# Patient Record
Sex: FEMALE | Race: BLACK OR AFRICAN AMERICAN | Employment: OTHER | URBAN - METROPOLITAN AREA
[De-identification: names, ages, dates, MRNs, and addresses within clinical notes are randomized per-mention and may not be internally consistent; named-entity substitution may affect disease eponyms.]

---

## 2017-12-26 ENCOUNTER — HOSPITAL ENCOUNTER (EMERGENCY)
Age: 82
Discharge: HOME | End: 2017-12-26
Attending: EMERGENCY MEDICINE | Admitting: EMERGENCY MEDICINE
Payer: MEDICARE

## 2017-12-26 VITALS
SYSTOLIC BLOOD PRESSURE: 202 MMHG | DIASTOLIC BLOOD PRESSURE: 60 MMHG | OXYGEN SATURATION: 97 % | RESPIRATION RATE: 16 BRPM | TEMPERATURE: 98 F | HEART RATE: 82 BPM

## 2017-12-26 DIAGNOSIS — F03.90 DEMENTIA WITHOUT BEHAVIORAL DISTURBANCE, UNSPECIFIED DEMENTIA TYPE: Primary | ICD-10-CM

## 2017-12-26 PROCEDURE — 99282 EMERGENCY DEPT VISIT SF MDM: CPT

## 2017-12-26 NOTE — PROGRESS NOTES
Patient was brought to ED by her son, Shaila Haas who was also being seen for treatment. Per physician, patient has dementia and her son stated that he was having difficulty taking care of her home. A referral has been made to [facility] to complete an evaluation for home health care. Patient's POA is her daughter, Tawnya Campos (telephone number [phone]). CM advised her that [facility] will be contacting them.

## 2017-12-26 NOTE — DISCHARGE INSTRUCTIONS
Alzheimer's Disease: Care Instructions  Your Care Instructions    Alzheimer's disease is a type of dementia. It causes memory loss and affects judgment, language, and behavior. You may have trouble making decisions or may get lost in places that you used to know well. Alzheimer's disease is different than mild memory loss that occurs with aging. It is not clear what causes Alzheimer's disease, but it is the most common form of dementia in older adults. Finding out that you have this disease is a shock. You may be afraid and worried about how the condition will change your life. Although there is no cure at this time, medicine in some cases may slow memory loss for a while. Other medicines may be able to help you sleep or cope with depression and behavior changes. Alzheimer's disease is different for everyone. It may take many years to develop. In some cases, people can function well for a long time. In the early stage of the disease, you can do things at home to make life easier and safer. You also can keep doing your hobbies and other activities. Many people find comfort in planning now for their future needs. Follow-up care is a key part of your treatment and safety. Be sure to make and go to all appointments, and call your doctor if you are having problems. It's also a good idea to know your test results and keep a list of the medicines you take. How can you care for yourself at home? Taking care of yourself  · If your doctor gives you medicines, take them exactly as prescribed. Call your doctor if you think you are having a problem with your medicine. You will get more details on the medicines your doctor prescribes. · Eat a balanced diet. Get plenty of whole grains, fruits, and vegetables every day. If you are not hungry at mealtimes, eat snacks at midmorning and in the afternoon. Try drinks such as Boost, Ensure, or Sustacal if you are having trouble keeping your weight up. · Stay active.  Exercise such as walking may slow the decline of your mental abilities. Try to stay active mentally too. Read and work crossword puzzles if you enjoy these activities. · If you have trouble sleeping, do not nap during the day. Get regular exercise (but not within several hours of bedtime). Drink a glass of warm milk or caffeine-free herbal tea before going to bed. · Ask your doctor about support groups and other resources in your area. They can help people who have Alzheimer's disease and their families. · Be patient. You may find that a task takes you longer than it used to. · If you have not already done so, make a list of advance directives. Advance directives are instructions to your doctor and family members about what kind of care you want if you become unable to speak or express yourself. Talk to a  about making a will, if you do not already have one. Keeping schedules  · Develop a routine. You will feel less frustrated or confused if you have a clear, simple plan of what to do every day. ¨ Make lists of your medicines and when to take them. ¨ Write down appointments and other tasks in a calendar. ¨ Put sticky notes around the house to help you remember events and other things you have to do. ¨ Schedule activities and tasks for times of the day when you are best able to handle them. Staying safe  · Tell someone when you are going out and where you are going. Let the person know when you will be back. Before you go out alone, write down where you are going, how to get there, and how to get back home. Do this even if you have gone there many times before. Take someone along with you when possible. · Make your home safe. Tack down rugs, put no-slip tape in the tub, use handrails, and put safety switches on stoves and appliances. · Have a family member or other caregiver tell you whether you are driving badly. Deciding to stop driving is very hard for many people. Driving helps you feel independent.  Your state 's license bureau can do a driving test if there is any question. Plan for other means of getting around when you are no longer able to drive. · Use strong lighting, especially at night. Put night-lights in bedrooms, hallways, and bathrooms. · Lower the hot water temperature setting to 120°F or lower to avoid burns. When should you call for help? Call 911 anytime you think you may need emergency care. For example, call if:  ? · You are lost and do not know whom to call. ? · You are injured and do not know whom to call. ?Call your doctor now or seek immediate medical care if:  ? · Your symptoms suddenly get much worse. ? Watch closely for changes in your health, and be sure to contact your doctor if:  ? · You want more information about how you can take care of yourself. Where can you learn more? Go to http://shauna-shar.info/. Enter Y179 in the search box to learn more about \"Alzheimer's Disease: Care Instructions. \"  Current as of: May 12, 2017  Content Version: 11.4  © 3239-8743 [employer]. Care instructions adapted under license by [employer] (which disclaims liability or warranty for this information). If you have questions about a medical condition or this instruction, always ask your healthcare professional. [place] [address] any warranty or liability for your use of this information. Dementia: Care Instructions  Your Care Instructions    Dementia is a loss of mental skills that affects your daily life. It is different than the occasional trouble with memory that is part of aging. You may find it hard to remember things that you feel you should be able to remember. Or you may feel that your mind is just not working as well as usual.  Finding out that you have dementia is a shock. You may be afraid and worried about how the condition will change your life.  Although there is no cure at this time, medicine may slow memory loss and improve thinking for a while. Other medicines may be able to help you sleep or cope with depression and behavior changes. Dementia often gets worse slowly. But it can get worse quickly. As dementia gets worse, it may become harder to do common things that take planning, like making a list and going shopping. Over time, the disease may make it hard for you to take care of yourself. Some people with dementia need others to help care for them. Dementia is different for everyone. You may be able to function well for a long time. In the early stage of the condition, you can do things at home to make life easier and safer. You also can keep doing your hobbies and other activities. Many people find comfort in planning now for their future needs. Follow-up care is a key part of your treatment and safety. Be sure to make and go to all appointments, and call your doctor if you are having problems. It's also a good idea to know your test results and keep a list of the medicines you take. How can you care for yourself at home? · Take your medicines exactly as prescribed. Call your doctor if you think you are having a problem with your medicine. · Eat healthy foods. Eat lots of whole grains, fruits, and vegetables every day. If you are not hungry, try snacks or nutritional drinks such as Boost, Ensure, or Sustacal.  · If you have problems sleeping:  ¨ Try not to nap too close to your bedtime. ¨ Exercise regularly. Walking is a good choice. ¨ Try a glass of warm milk or caffeine-free herbal tea before bed. · Do tasks and activities during the time of day when you feel your best. It may help to develop a daily routine. · Post labels, lists, and sticky notes to help you remember things. Write your activities on a calendar you can easily find. Put your clock where you can easily see it. · Stay active. Take walks in familiar places, or with friends or loved ones. Try to stay active mentally too.  Read and work crossword puzzles if you enjoy these activities. · Do not drive unless you can pass an on-road driving test. If you are not sure if you are safe to drive, your state 's license bureau can test you. · Keep a cordless phone and a flashlight with new batteries by your bed. If possible, put a phone in each of the main rooms of your house, or carry a cell phone in case you fall and cannot reach a phone. Or, you can wear a device around your neck or wrist. You push a button that sends a signal for help. Acknowledge your emotions and plan for the future  · Talk openly and honestly with your doctor. · Let yourself grieve. It is common to feel angry, scared, frustrated, anxious, or depressed. · Get emotional support from family, friends, a support group, or a counselor experienced in working with people who have dementia. · Ask for help if you need it. · Plan for the future. ¨ Talk to your family and doctor about preparing a living will and other important papers while you can make decisions. These papers tell your doctors how to care for you at the end of your life. ¨ Consider naming a person to make decisions about your care if you are not able to. When should you call for help? Call 911 anytime you think you may need emergency care. For example, call if:  ? · You are lost and do not know whom to call. ? · You are injured and do not know whom to call. ?Call your doctor now or seek immediate medical care if:  ? · You are more confused or upset than usual.   ? · You feel like you could hurt yourself because your mind is not working well. ? Watch closely for changes in your health, and be sure to contact your doctor if you have any problems. Where can you learn more? Go to http://shauna-shar.info/. Enter C830 in the search box to learn more about \"Dementia: Care Instructions. \"  Current as of: May 12, 2017  Content Version: 11.4  © 9977-4854 [employer], [facility].  Care instructions adapted under license by [employer] (which disclaims liability or warranty for this information). If you have questions about a medical condition or this instruction, always ask your healthcare professional. [address] any warranty or liability for your use of this information. Helping a Person With Alzheimer's Disease: Care Instructions  Your Care Instructions    Alzheimer's disease is a type of dementia. It affects memory, intelligence, judgment, language, and behavior. It is not clear what causes this disease. But it is the most common form of dementia in older adults. It may take many years to develop. Alzheimer's disease is different than mild memory loss that occurs with aging. Family members usually notice symptoms first. But the person also may realize that something is wrong. Follow-up care is a key part of your loved one's treatment and safety. Be sure to make and go to all appointments, and call your doctor if your loved one is having problems. It's also a good idea to know your loved one's test results and keep a list of the medicines he or she takes. How can you care for your loved one at home? · Develop a routine. The person will feel less frustrated or confused with a clear, simple daily plan. Remind him or her about important facts and events. · Be patient. It may take longer for the person to complete a task than it used to. · Help the person eat a balanced diet. Serve plenty of whole grains, fruits, and vegetables every day. If the person is not eating well at mealtimes, give snacks at midmorning and in the afternoon. Offer drinks such as Boost, Ensure, or Sustacal if he or she is losing weight. · Encourage exercise. Walking and other activity may slow the decline of mental ability. Help the person keep an active mind. Encourage hobbies such as reading and crossword puzzles. · Take steps to help if the person is sundowning.  This is the restless behavior and trouble with sleeping that may occur in late afternoon and at night. Try not to let the person nap during the day. Offer a glass of warm milk or caffeine-free tea before bedtime. · Ask family members and friends for help. You may need breaks where others can help care for the person. · Talk to the person's doctor about what resources are available for help in your area. · Review all of the person's medicines with his or her doctor. · For as long as the person is able, allow him or her to make decisions about activities, food, clothing, and other choices. Let the person be independent, even if tasks take more time or are not done perfectly. Tailor tasks to the person's abilities. For example, if cooking is no longer safe, ask for other help. He or she can help set the table or make simple dishes such as a salad. When the person needs help, offer it gently. Keeping safe  · Make your home (or the person's home) safe. Tack down rugs, and put no-slip tape in the tub. Install handrails, and put safety switches on stoves and appliances. Keep rooms free of clutter. Make sure walkways around furniture are clear. Do not move furniture around, because the person may become confused. · Use locks on doors and cupboards. Lock up knives, scissors, medicines, cleaning supplies, and other dangerous things. · Do not let the person drive or cook if he or she cannot do it safely. A person with Alzheimer's should not drive unless he or she is able to pass an on-road driving test. Your state 's license bureau can do a driving test if there is any question. · Get medical alert jewelry for the person so you can be contacted if he or she wanders away. If possible, provide a safe place for wandering, such as an enclosed yard or garden. When should you call for help? Call 911 anytime you think you may need emergency care. For example, call if:  ? · A person who has Alzheimer's disease has disappeared. ? · A person who has Alzheimer's disease is seriously injured. ?Call your doctor now or seek immediate medical care if:  ? · The person you are caring for suddenly sees or hears things that are not there (hallucinates). ? · The person you are caring for has a sudden, drastic change in his or her behavior. ? Watch closely for changes in your loved one's health, and be sure to contact the doctor if:  ? · A person who has Alzheimer's disease gradually gets worse or has symptoms that could cause injury. ? · You need help caring for a person with Alzheimer's disease. ? · The person has problems with his or her medicine. Where can you learn more? Go to http://shauna-shar.info/. Enter N368 in the search box to learn more about \"Helping a Person With Alzheimer's Disease: Care Instructions. \"  Current as of: May 12, 2017  Content Version: 11.4  © 4209-7113 [employer]. Care instructions adapted under license by [employer] (which disclaims liability or warranty for this information). If you have questions about a medical condition or this instruction, always ask your healthcare professional. [place] [address] any warranty or liability for your use of this information.

## 2017-12-26 NOTE — ED NOTES
Dr suresh md, was notified that pt left prior to receiving discharge paperwork. Pt reported that \"i can go to my own doctor. You're not going to keep me. \" pt appeared well-dressed with good hygiene. Unable to fully assess pt r/t pt's refusal. Pt refused to have her blood pressure rechecked, stating \"i feel fine. I don't need to be seen. \" Pt's son reported that she \"helps take care of all of us. \" pt left with pt's son with no si/s of acute distress and a steady gait.

## 2017-12-26 NOTE — ED PROVIDER NOTES
EMERGENCY DEPARTMENT HISTORY AND PHYSICAL EXAM      Date: 12/26/2017  Patient Name: Meet Yeboah    History of Presenting Illness     Chief Complaint   Patient presents with    Altered mental status     pt unsure what son brought her here; history of dementia; states \"I had to be seen so just brought her; I can't clean her or anything and don't want her getting bedsores\"     History Provided By: Patient and son    HPI: Meet Yeboah, 80 y.o. female with PMHx significant for HTN and dementia, presents ambulatory to the ED with cc of diarrhea and a sleep disturbance. Per son, pt has been presenting with intermittent diarrhea for the past week with a moderate amount of relief currently. Pt's son more concerningly reports that the pt has been waking up in the middle of the night often requesting to go to the doctors and stating \"I need to go to the hospital\". Of note, pt reports he has been having a hard time caring for the patient alone reporting \"I am a man. I shouldn't be the one changing her or cleaning her diarrhea\". Pt's son informs he is unable to care for her alone and is not the POA. He reports that his sister is the pt's POA and wants to put the pt in a nursing facility, something he does not want to do. Pt's son reports he needs help caring for the pt. Pt denies any and all symptoms, and notes she does not want to be treated for her hypertension. As there are no physical symptoms or complaints of pain, there are no modifying factors, severity, quality, duration, or associated symptoms. Pt additionally denies any nausea, vomiting, fevers, chills, abdominal pain, urinary complications, chest pain, or SOB. Social Hx: - EtOH; - Smoker; - Illicit Drugs    PCP: Peter Sandhu MD    There are no other complaints, changes, or physical findings at this time. Past History     Past Medical History:  Past Medical History:   Diagnosis Date    Hypertension      Past Surgical History:  History reviewed. No pertinent surgical history. Family History:  History reviewed. No pertinent family history. Social History:  Social History   Substance Use Topics    Smoking status: Never Smoker    Smokeless tobacco: None    Alcohol use No     Allergies:  No Known Allergies    Review of Systems   Review of Systems   Constitutional: Negative for chills and fever. HENT: Negative for congestion, rhinorrhea, sneezing and sore throat. Eyes: Negative for redness and visual disturbance. Respiratory: Negative for shortness of breath. Cardiovascular: Negative for leg swelling. Gastrointestinal: Positive for diarrhea. Negative for abdominal pain, nausea and vomiting. Genitourinary: Negative for difficulty urinating, dysuria, frequency, hematuria and urgency. Musculoskeletal: Negative for back pain, myalgias and neck stiffness. Skin: Negative for rash. Neurological: Negative for dizziness, syncope, weakness and headaches. Hematological: Negative for adenopathy. Psychiatric/Behavioral: Positive for sleep disturbance. All other systems reviewed and are negative. Physical Exam   Physical Exam   Constitutional: She appears well-developed and well-nourished. Pleasantly demented, well dressed   HENT:   Head: Normocephalic and atraumatic. Mouth/Throat: Oropharynx is clear and moist.   Eyes: Conjunctivae and EOM are normal.   Neck: Normal range of motion and full passive range of motion without pain. Neck supple. Cardiovascular: Normal rate, regular rhythm, S1 normal, S2 normal, normal heart sounds, intact distal pulses and normal pulses. No murmur heard. Pulmonary/Chest: Effort normal and breath sounds normal. No respiratory distress. She has no wheezes. Abdominal: Soft. Normal appearance and bowel sounds are normal. She exhibits no distension. There is no tenderness. There is no rebound. Musculoskeletal: Normal range of motion. Neurological: She is alert. She has normal strength.    Oriented to person/self   Skin: Skin is warm, dry and intact. No rash noted. Psychiatric: She has a normal mood and affect. Her speech is normal and behavior is normal. Judgment and thought content normal.   Nursing note and vitals reviewed. Medical Decision Making   I am the first provider for this patient. I reviewed the vital signs, available nursing notes, past medical history, past surgical history, family history and social history. Vital Signs-Reviewed the patient's vital signs. Patient Vitals for the past 12 hrs:   Temp Pulse Resp BP SpO2   12/26/17 1223 98 °F (36.7 °C) 82 16 (!) 202/60 97 %     Pulse Oximetry Analysis - 97% on RA    Cardiac Monitor:   Rate: 82 bpm  Rhythm: Normal Sinus Rhythm      Records Reviewed: Old Medical Records    Provider Notes (Medical Decision Making):   DDx: dementia, UTI    ED Course:   Initial assessment performed. The patients presenting problems have been discussed, and they are in agreement with the care plan formulated and outlined with them. I have encouraged them to ask questions as they arise throughout their visit. Progress Note:   12:55 PM   at bedside. Written by Daysi Cast ED Scribe, as dictated by Milo Cobb MD.     Progress Note:   1:00 PM  Milo Cobb MD had a lengthy discussion with the pt's POA, Ms. Catracho Solitario. POA notes she was with the patient for the past three days without any indications of these complaints. Reports that her two brothers (one present in the ED with pt) have multiple disabilities and mental health concerns hence her being the POA. Reports that the pt has Medicare and not Medicaid and therefore is unable to get home health care. Notes she feels this is a family affair and requests that if home services are able to be provided for the pt, that would be appreciated. States to call her as needed. Discussed pt's elevated BP and indication of agitation as the cause; all in agreement to not treat. Written by Hugo Maguire, ED Scribe, as dictated by Canelo Noyola MD.     Progress Note:   1:02 PM  Pt with her coat on attempting to leave the ED. Will discharge pt and instruct pt and son to return to the ED if pt would like to be evaluated or has any emergent conditions. Written by Hugo Maguire, ED Scribe, as dictated by Canelo Noyola MD.     Disposition:  DISCHARGE NOTE:    1:27 PM  The patient is ready for discharge. The patient signs, symptoms, diagnosis, and discharge instructions have been discussed and the patient has conveyed their understanding. The patient is to follow-up as reccommended or returned to the ER should their symptoms worsen. Plan has been discussed and patient is in agreement. PLAN:  1. Follow-up Information     Follow up With Details Comments Janet Boyle III, MD Schedule an appointment as soon as possible for a visit  [address] EMERGENCY DEPT  As needed, If symptoms worsen [address] [facility]  [phone]        Return to ED if worse     Diagnosis     Clinical Impression:   1. Dementia without behavioral disturbance, unspecified dementia type      Attestations: This note is prepared by Hugo Maguire, acting as Scribe for Canelo Noyola MD.    Canelo Noyola MD: The scribe's documentation has been prepared under my direction and personally reviewed by me in its entirety. I confirm that the note above accurately reflects all work, treatment, procedures, and medical decision making performed by me.

## 2017-12-27 ENCOUNTER — HOME HEALTH ADMISSION (OUTPATIENT)
Dept: HOME HEALTH SERVICES | Facility: HOME HEALTH | Age: 82
End: 2017-12-27
Payer: MEDICARE

## 2017-12-31 ENCOUNTER — HOME CARE VISIT (OUTPATIENT)
Dept: SCHEDULING | Facility: HOME HEALTH | Age: 82
End: 2017-12-31
Payer: MEDICARE

## 2017-12-31 PROCEDURE — 3331090001 HH PPS REVENUE CREDIT

## 2017-12-31 PROCEDURE — 400013 HH SOC

## 2017-12-31 PROCEDURE — 3331090002 HH PPS REVENUE DEBIT

## 2017-12-31 PROCEDURE — G0299 HHS/HOSPICE OF RN EA 15 MIN: HCPCS

## 2018-01-01 ENCOUNTER — HOSPITAL ENCOUNTER (EMERGENCY)
Age: 83
Discharge: HOME | End: 2018-01-01
Attending: EMERGENCY MEDICINE
Payer: MEDICARE

## 2018-01-01 ENCOUNTER — HOME CARE VISIT (OUTPATIENT)
Dept: HOME HEALTH SERVICES | Facility: HOME HEALTH | Age: 83
End: 2018-01-01
Payer: MEDICARE

## 2018-01-01 ENCOUNTER — APPOINTMENT (OUTPATIENT)
Dept: GENERAL RADIOLOGY | Age: 83
End: 2018-01-01
Attending: PHYSICIAN ASSISTANT
Payer: MEDICARE

## 2018-01-01 VITALS
BODY MASS INDEX: 21.01 KG/M2 | OXYGEN SATURATION: 99 % | WEIGHT: 107 LBS | HEIGHT: 60 IN | SYSTOLIC BLOOD PRESSURE: 195 MMHG | HEART RATE: 67 BPM | TEMPERATURE: 97.9 F | DIASTOLIC BLOOD PRESSURE: 83 MMHG | RESPIRATION RATE: 18 BRPM

## 2018-01-01 VITALS
BODY MASS INDEX: 22.08 KG/M2 | HEART RATE: 73 BPM | HEIGHT: 62 IN | TEMPERATURE: 98 F | RESPIRATION RATE: 18 BRPM | SYSTOLIC BLOOD PRESSURE: 138 MMHG | OXYGEN SATURATION: 96 % | DIASTOLIC BLOOD PRESSURE: 70 MMHG | WEIGHT: 120 LBS

## 2018-01-01 DIAGNOSIS — S40.011A CONTUSION OF RIGHT SHOULDER, INITIAL ENCOUNTER: ICD-10-CM
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
## 2018-01-01 PROCEDURE — 3331090001 HH PPS REVENUE CREDIT

## 2018-01-01 PROCEDURE — 3331090002 HH PPS REVENUE DEBIT

## 2018-01-01 PROCEDURE — 73030 X-RAY EXAM OF SHOULDER: CPT

## 2018-01-01 PROCEDURE — 99282 EMERGENCY DEPT VISIT SF MDM: CPT

## 2018-01-01 NOTE — ED PROVIDER NOTES
EMERGENCY DEPARTMENT HISTORY AND PHYSICAL EXAM    Date: 1/1/2018  Patient Name: José Miguel Castro    History of Presenting Illness     Chief Complaint   Patient presents with    Shoulder Pain     right shouder         History Provided By: Patient's Son (s) and patient    Chief Complaint: R shoulder pain  Duration: hours   Timing:  Acute  Location: R shoulder  Quality: Aching  Severity: Moderate  Modifying Factors: worse with movement  Associated Symptoms: denies any other associated signs or symptoms      HPI: José Miguel Castro is a 80 y.o. female with a PMH of hypertension, dementia who presents with R shoulder pain. Son was with her in next room. Pt sts she slipped, son notes had on fuzzy socks and did not have shoes on, GLF. Pt sts fell on R shoulder, denies hitting head. Son found on R side and assisted up. Has been ambulatory since fall. No loc. No other complaints. Specifically denies HA, neck pain, back pain, numbness/tingling. Notes has had intermittent R shoulder pain for many years since injury in [place] and reported FX to clavicle. PCP: Trey Arriaga III, MD    Current Outpatient Prescriptions   Medication Sig Dispense Refill    acetaminophen (TYLENOL ARTHRITIS PAIN) 650 mg TbER Take 650 mg by mouth daily.  QUEtiapine (SEROQUEL) 25 mg tablet Take 25 mg by mouth nightly. Past History     Past Medical History:  Past Medical History:   Diagnosis Date    Hypertension        Past Surgical History:  History reviewed. No pertinent surgical history. Family History:  History reviewed. No pertinent family history. Social History:  Social History   Substance Use Topics    Smoking status: Never Smoker    Smokeless tobacco: Never Used    Alcohol use No       Allergies:  No Known Allergies      Review of Systems   Review of Systems   Reason unable to perform ROS: somewhat limited due to patient dementia. Constitutional: Negative for chills and fever. HENT: Negative for sore throat. Eyes: Negative for pain and discharge. Respiratory: Negative for cough. Cardiovascular: Negative for chest pain. Gastrointestinal: Negative for abdominal pain, nausea and vomiting. Musculoskeletal: Negative for back pain and neck pain. R shoulder pain   Skin: Negative for rash and wound. Neurological: Negative for facial asymmetry and headaches. Hematological: Does not bruise/bleed easily. Psychiatric/Behavioral: Negative for behavioral problems. Sons note behavior and mentation at baseline   All other systems reviewed and are negative. Physical Exam     Vitals:    01/01/18 1144   BP: 195/83   Pulse: 67   Resp: 18   Temp: 97.9 °F (36.6 °C)   SpO2: 99%   Weight: 48.5 kg (107 lb)   Height: 5' (1.524 m)     Physical Exam   Constitutional: She is oriented to person, place, and time. She appears well-developed and well-nourished. No distress. HENT:   Head: Normocephalic and atraumatic. Nose: Nose normal.   Mouth/Throat: Oropharynx is clear and moist.   Eyes: Conjunctivae and EOM are normal. Pupils are equal, round, and reactive to light. Neck: Normal range of motion. Neck supple. No C spine tenderness. Cardiovascular: Normal rate and normal heart sounds. No murmur heard. Pulmonary/Chest: Effort normal. She has no wheezes. She has no rales. Abdominal: Soft. There is no tenderness. Musculoskeletal: Normal range of motion. R shoulder: no reproducible tenderness,   However reproducible pain with forward flexion over 90 degrees or abduction over 100 degrees. No swelling, abrasion, ecchymosis to shoulder, clavicle. R elbow, wrist NT with FROM. R radial pulse strong. R hand with normal ROM. L shoulder FROM NT. No T or L spine ttp. No Hip tenderness. Hip fROM B   Lymphadenopathy:     She has no cervical adenopathy. Neurological: She is alert and oriented to person, place, and time. PEARLA  Neck FROM   equal  No facial droop.    BLE  - flex at hip, extend at knee, DF/PF at ankle B, equal, normal strength for age. Skin: Skin is warm and dry. She is not diaphoretic. Psychiatric: She has a normal mood and affect. Her behavior is normal.   Nursing note and vitals reviewed. Diagnostic Study Results       Radiologic Studies -   XR SHOULDER RT AP/LAT MIN 2 V   Final Result      no fracture  Degenerative changes    Medical Decision Making   I am the first provider for this patient. I reviewed the vital signs, available nursing notes, past medical history, past surgical history, family history and social history. Vital Signs-Reviewed the patient's vital signs. ED Course:     Disposition:  Home  pcp and/or ortho follow up    DISCHARGE NOTE:   12:50 PM      Care plan outlined and precautions discussed. Patient has no new complaints, changes, or physical findings. Results of xray were reviewed with the patient and sons at bedside. All medications were reviewed with the patient; will d/c home with tylenol. All of pt's questions and concerns were addressed. Patient was instructed and agrees to follow up with her primary care doctor or [facility] as referred, as well as to return to the ED upon further deterioration. Patient is ready to go home. Fall prevention reviewed. Follow-up Information     Follow up With Details Comments Janet Boyle III, MD  For continued management of your shoulder pain. [address] [place] [address],[address]      [address] [place] [address] - [facility]  If you continue to have pain in the shoulder, follow up with the orthopedic doctor. [id number] [facility]  [address]  [phone]          Current Discharge Medication List          Provider Notes (Medical Decision Making):   Fx, contusion. She has no other complaints than shoulder pain. Mechanical GLF  No bony spine pain. No focal neuro findings. Procedures:  Procedures        Diagnosis     Clinical Impression:   1. Fall, initial encounter    2.  Contusion of right shoulder, initial encounter

## 2018-01-01 NOTE — DISCHARGE INSTRUCTIONS
Preventing Falls: Care Instructions  Your Care Instructions    Getting around your home safely can be a challenge if you have injuries or health problems that make it easy for you to fall. Loose rugs and furniture in walkways are among the dangers for many older people who have problems walking or who have poor eyesight. People who have conditions such as arthritis, osteoporosis, or dementia also have to be careful not to fall. You can make your home safer with a few simple measures. Follow-up care is a key part of your treatment and safety. Be sure to make and go to all appointments, and call your doctor if you are having problems. It's also a good idea to know your test results and keep a list of the medicines you take. How can you care for yourself at home? Taking care of yourself  · You may get dizzy if you do not drink enough water. To prevent dehydration, drink plenty of fluids, enough so that your urine is light yellow or clear like water. Choose water and other caffeine-free clear liquids. If you have kidney, heart, or liver disease and have to limit fluids, talk with your doctor before you increase the amount of fluids you drink. · Exercise regularly to improve your strength, muscle tone, and balance. Walk if you can. Swimming may be a good choice if you cannot walk easily. · Have your vision and hearing checked each year or any time you notice a change. If you have trouble seeing and hearing, you might not be able to avoid objects and could lose your balance. · Know the side effects of the medicines you take. Ask your doctor or pharmacist whether the medicines you take can affect your balance. Sleeping pills or sedatives can affect your balance. · Limit the amount of alcohol you drink. Alcohol can impair your balance and other senses. · Ask your doctor whether calluses or corns on your feet need to be removed.  If you wear loose-fitting shoes because of calluses or corns, you can lose your balance and fall. · Talk to your doctor if you have numbness in your feet. Preventing falls at home  · Remove raised doorway thresholds, throw rugs, and clutter. Repair loose carpet or raised areas in the floor. · Move furniture and electrical cords to keep them out of walking paths. · Use nonskid floor wax, and wipe up spills right away, especially on ceramic tile floors. · If you use a walker or cane, put rubber tips on it. If you use crutches, clean the bottoms of them regularly with an abrasive pad, such as steel wool. · Keep your house well lit, especially [place], and outside walkways. Use night-lights in areas such as hallways and bathrooms. Add extra light switches or use remote switches (such as switches that go on or off when you clap your hands) to make it easier to turn lights on if you have to get up during the night. · Install sturdy handrails on stairways. · Move items in your cabinets so that the things you use a lot are on the lower shelves (about waist level). · Keep a cordless phone and a flashlight with new batteries by your bed. If possible, put a phone in each of the main rooms of your house, or carry a cell phone in case you fall and cannot reach a phone. Or, you can wear a device around your neck or wrist. You push a button that sends a signal for help. · Wear low-heeled shoes that fit well and give your feet good support. Use footwear with nonskid soles. Check the heels and soles of your shoes for wear. Repair or replace worn heels or soles. · Do not wear socks without shoes on wood floors. · Walk on the grass when the sidewalks are slippery. If you live in an area that gets snow and ice in the winter, sprinkle salt on slippery steps and sidewalks. Preventing falls in the bath  · Install grab bars and nonskid mats inside and outside your shower or tub and near the toilet and sinks. · Use shower chairs and bath benches.   · Use a hand-held shower head that will allow you to sit while showering. · Get into a tub or shower by putting the weaker leg in first. Get out of a tub or shower with your strong side first.  · Repair loose toilet seats and consider installing a raised toilet seat to make getting on and off the toilet easier. · Keep your bathroom door unlocked while you are in the shower. Where can you learn more? Go to http://shauna-shar.info/. Enter 0476 [id number] in the search box to learn more about \"Preventing Falls: Care Instructions. \"  Current as of: May 12, 2017  Content Version: 11.4  © 8589-2405 [employer]. Care instructions adapted under license by [employer] (which disclaims liability or warranty for this information). If you have questions about a medical condition or this instruction, always ask your healthcare professional. [place] [address] any warranty or liability for your use of this information. Shoulder Pain: Care Instructions  Your Care Instructions    You can hurt your shoulder by using it too much during an activity, such as fishing or baseball. It can also happen as part of the everyday wear and tear of getting older. Shoulder injuries can be slow to heal, but your shoulder should get better with time. Your doctor may recommend a sling to rest your shoulder. If you have injured your shoulder, you may need testing and treatment. Follow-up care is a key part of your treatment and safety. Be sure to make and go to all appointments, and call your doctor if you are having problems. It's also a good idea to know your test results and keep a list of the medicines you take. How can you care for yourself at home? · Take pain medicines exactly as directed. ¨ If the doctor gave you a prescription medicine for pain, take it as prescribed. ¨ If you are not taking a prescription pain medicine, ask your doctor if you can take an over-the-counter medicine.   ¨ Do not take two or more pain medicines at the same time unless the doctor told you to. Many pain medicines contain acetaminophen, which is Tylenol. Too much acetaminophen (Tylenol) can be harmful. · If your doctor recommends that you wear a sling, use it as directed. Do not take it off before your doctor tells you to. · Put ice or a cold pack on the sore area for 10 to 20 minutes at a time. Put a thin cloth between the ice and your skin. · If there is no swelling, you can put moist heat, a heating pad, or a warm cloth on your shoulder. Some doctors suggest alternating between hot and cold. · Rest your shoulder for a few days. If your doctor recommends it, you can then begin gentle exercise of the shoulder, but do not lift anything heavy. When should you call for help? Call 911 anytime you think you may need emergency care. For example, call if:  ? · You have chest pain or pressure. This may occur with:  ¨ Sweating. ¨ Shortness of breath. ¨ Nausea or vomiting. ¨ Pain that spreads from the chest to the neck, jaw, or one or both shoulders or arms. ¨ Dizziness or lightheadedness. ¨ A fast or uneven pulse. After calling 911, chew 1 adult-strength aspirin. Wait for an ambulance. Do not try to drive yourself. ? · Your arm or hand is cool or pale or changes color. ?Call your doctor now or seek immediate medical care if:  ? · You have signs of infection, such as:  ¨ Increased pain, swelling, warmth, or redness in your shoulder. ¨ Red streaks leading from a place on your shoulder. ¨ Pus draining from an area of your shoulder. ¨ Swollen lymph nodes in your neck, armpits, or groin. ¨ A fever. ? Watch closely for changes in your health, and be sure to contact your doctor if:  ? · You cannot use your shoulder. ? · Your shoulder does not get better as expected. Where can you learn more? Go to http://shauna-shar.info/. Enter C608 in the search box to learn more about \"Shoulder Pain: Care Instructions. \"  Current as of: March 21, 2017  Content Version: 11.4  © 1290-6538 [employer]. Care instructions adapted under license by [employer] (which disclaims liability or warranty for this information). If you have questions about a medical condition or this instruction, always ask your healthcare professional. [address] any warranty or liability for your use of this information.

## 2018-01-01 NOTE — ED NOTES
Patient (s)   given copy of dc instructions and   script(s). Patient(s) sons verbalized understanding of instructions and script (s). Patient given a current medication reconciliation form and verbalized understanding of their medications. Patient (s)sons verbalized understanding of the importance of discussing medications with  his or her physician or clinic when they follow up. Patient alert and oriented and in no acute distress. Pt verbalizes pain scale of 0 out of 10. Patient discharged home ambulatory with sons.

## 2018-01-01 NOTE — ED NOTES
Emergency Department Nursing Plan of Care       The Nursing Plan of Care is developed from the Nursing assessment and Emergency Department Attending provider initial evaluation. The plan of care may be reviewed in the ED Provider note.     The Plan of Care was developed with the following considerations:   Patient / Family readiness to learn indicated by:verbalized understanding  Persons(s) to be included in education: patient  Barriers to Learning/Limitations:No    Signed     Alvarez Treviño RN    1/1/2018   12:12 PM

## 2018-01-02 PROCEDURE — 3331090002 HH PPS REVENUE DEBIT

## 2018-01-02 PROCEDURE — 3331090001 HH PPS REVENUE CREDIT

## 2018-01-03 PROCEDURE — 3331090001 HH PPS REVENUE CREDIT

## 2018-01-03 PROCEDURE — 3331090002 HH PPS REVENUE DEBIT

## 2018-01-04 ENCOUNTER — HOME CARE VISIT (OUTPATIENT)
Dept: HOME HEALTH SERVICES | Facility: HOME HEALTH | Age: 83
End: 2018-01-04
Payer: MEDICARE

## 2018-01-04 ENCOUNTER — HOME CARE VISIT (OUTPATIENT)
Dept: SCHEDULING | Facility: HOME HEALTH | Age: 83
End: 2018-01-04
Payer: MEDICARE

## 2018-01-04 PROCEDURE — 3331090002 HH PPS REVENUE DEBIT

## 2018-01-04 PROCEDURE — 3331090001 HH PPS REVENUE CREDIT

## 2018-01-04 PROCEDURE — G0155 HHCP-SVS OF CSW,EA 15 MIN: HCPCS

## 2018-01-05 PROCEDURE — 3331090002 HH PPS REVENUE DEBIT

## 2018-01-05 PROCEDURE — 3331090001 HH PPS REVENUE CREDIT

## 2018-01-06 PROCEDURE — 3331090002 HH PPS REVENUE DEBIT

## 2018-01-06 PROCEDURE — 3331090001 HH PPS REVENUE CREDIT

## 2018-01-07 PROCEDURE — 3331090002 HH PPS REVENUE DEBIT

## 2018-01-07 PROCEDURE — 3331090001 HH PPS REVENUE CREDIT

## 2018-01-08 ENCOUNTER — HOME CARE VISIT (OUTPATIENT)
Dept: HOME HEALTH SERVICES | Facility: HOME HEALTH | Age: 83
End: 2018-01-08
Payer: MEDICARE

## 2018-01-08 ENCOUNTER — HOME CARE VISIT (OUTPATIENT)
Dept: SCHEDULING | Facility: HOME HEALTH | Age: 83
End: 2018-01-08
Payer: MEDICARE

## 2018-01-08 PROCEDURE — 3331090002 HH PPS REVENUE DEBIT

## 2018-01-08 PROCEDURE — 3331090001 HH PPS REVENUE CREDIT

## 2018-01-08 PROCEDURE — G0156 HHCP-SVS OF AIDE,EA 15 MIN: HCPCS

## 2018-01-09 PROCEDURE — 3331090002 HH PPS REVENUE DEBIT

## 2018-01-09 PROCEDURE — 3331090001 HH PPS REVENUE CREDIT

## 2018-01-10 PROCEDURE — 3331090001 HH PPS REVENUE CREDIT

## 2018-01-10 PROCEDURE — 3331090002 HH PPS REVENUE DEBIT

## 2018-01-11 ENCOUNTER — HOME CARE VISIT (OUTPATIENT)
Dept: SCHEDULING | Facility: HOME HEALTH | Age: 83
End: 2018-01-11
Payer: MEDICARE

## 2018-01-11 PROCEDURE — 3331090001 HH PPS REVENUE CREDIT

## 2018-01-11 PROCEDURE — 3331090002 HH PPS REVENUE DEBIT

## 2018-01-11 PROCEDURE — G0156 HHCP-SVS OF AIDE,EA 15 MIN: HCPCS

## 2018-01-12 PROCEDURE — 3331090001 HH PPS REVENUE CREDIT

## 2018-01-12 PROCEDURE — 3331090002 HH PPS REVENUE DEBIT

## 2018-01-13 ENCOUNTER — HOME CARE VISIT (OUTPATIENT)
Dept: SCHEDULING | Facility: HOME HEALTH | Age: 83
End: 2018-01-13
Payer: MEDICARE

## 2018-01-13 PROCEDURE — 3331090001 HH PPS REVENUE CREDIT

## 2018-01-13 PROCEDURE — G0300 HHS/HOSPICE OF LPN EA 15 MIN: HCPCS

## 2018-01-13 PROCEDURE — 3331090002 HH PPS REVENUE DEBIT

## 2018-01-14 PROCEDURE — 3331090002 HH PPS REVENUE DEBIT

## 2018-01-14 PROCEDURE — 3331090001 HH PPS REVENUE CREDIT

## 2018-01-15 ENCOUNTER — HOME CARE VISIT (OUTPATIENT)
Dept: SCHEDULING | Facility: HOME HEALTH | Age: 83
End: 2018-01-15
Payer: MEDICARE

## 2018-01-15 VITALS
OXYGEN SATURATION: 96 % | RESPIRATION RATE: 17 BRPM | HEART RATE: 74 BPM | DIASTOLIC BLOOD PRESSURE: 70 MMHG | SYSTOLIC BLOOD PRESSURE: 110 MMHG | TEMPERATURE: 97.8 F

## 2018-01-15 PROCEDURE — G0156 HHCP-SVS OF AIDE,EA 15 MIN: HCPCS

## 2018-01-15 PROCEDURE — 3331090001 HH PPS REVENUE CREDIT

## 2018-01-15 PROCEDURE — 3331090002 HH PPS REVENUE DEBIT

## 2018-01-16 PROCEDURE — 3331090001 HH PPS REVENUE CREDIT

## 2018-01-16 PROCEDURE — 3331090002 HH PPS REVENUE DEBIT

## 2018-01-17 ENCOUNTER — HOME CARE VISIT (OUTPATIENT)
Dept: SCHEDULING | Facility: HOME HEALTH | Age: 83
End: 2018-01-17
Payer: MEDICARE

## 2018-01-17 VITALS
TEMPERATURE: 97.8 F | HEART RATE: 60 BPM | RESPIRATION RATE: 18 BRPM | SYSTOLIC BLOOD PRESSURE: 120 MMHG | DIASTOLIC BLOOD PRESSURE: 70 MMHG

## 2018-01-17 PROCEDURE — G0300 HHS/HOSPICE OF LPN EA 15 MIN: HCPCS

## 2018-01-17 PROCEDURE — 3331090001 HH PPS REVENUE CREDIT

## 2018-01-17 PROCEDURE — 3331090002 HH PPS REVENUE DEBIT

## 2018-01-18 ENCOUNTER — HOME CARE VISIT (OUTPATIENT)
Dept: SCHEDULING | Facility: HOME HEALTH | Age: 83
End: 2018-01-18
Payer: MEDICARE

## 2018-01-18 PROCEDURE — G0156 HHCP-SVS OF AIDE,EA 15 MIN: HCPCS

## 2018-01-18 PROCEDURE — 3331090002 HH PPS REVENUE DEBIT

## 2018-01-18 PROCEDURE — 3331090001 HH PPS REVENUE CREDIT

## 2018-01-19 ENCOUNTER — HOME CARE VISIT (OUTPATIENT)
Dept: SCHEDULING | Facility: HOME HEALTH | Age: 83
End: 2018-01-19
Payer: MEDICARE

## 2018-01-19 ENCOUNTER — HOME CARE VISIT (OUTPATIENT)
Dept: HOME HEALTH SERVICES | Facility: HOME HEALTH | Age: 83
End: 2018-01-19
Payer: MEDICARE

## 2018-01-19 PROCEDURE — 3331090001 HH PPS REVENUE CREDIT

## 2018-01-19 PROCEDURE — G0299 HHS/HOSPICE OF RN EA 15 MIN: HCPCS

## 2018-01-19 PROCEDURE — 3331090002 HH PPS REVENUE DEBIT

## 2018-01-20 VITALS
DIASTOLIC BLOOD PRESSURE: 82 MMHG | RESPIRATION RATE: 12 BRPM | HEART RATE: 60 BPM | OXYGEN SATURATION: 96 % | SYSTOLIC BLOOD PRESSURE: 158 MMHG | TEMPERATURE: 98 F

## 2018-01-20 PROCEDURE — 3331090002 HH PPS REVENUE DEBIT

## 2018-01-20 PROCEDURE — 3331090001 HH PPS REVENUE CREDIT

## 2018-01-21 PROCEDURE — 3331090001 HH PPS REVENUE CREDIT

## 2018-01-21 PROCEDURE — 3331090002 HH PPS REVENUE DEBIT

## 2018-01-22 PROCEDURE — 3331090001 HH PPS REVENUE CREDIT

## 2018-01-22 PROCEDURE — 3331090002 HH PPS REVENUE DEBIT

## 2018-01-23 PROCEDURE — 3331090001 HH PPS REVENUE CREDIT

## 2018-01-23 PROCEDURE — 3331090002 HH PPS REVENUE DEBIT

## 2018-01-24 PROCEDURE — 3331090002 HH PPS REVENUE DEBIT

## 2018-01-24 PROCEDURE — 3331090001 HH PPS REVENUE CREDIT

## 2018-01-25 PROCEDURE — 3331090001 HH PPS REVENUE CREDIT

## 2018-01-25 PROCEDURE — 3331090002 HH PPS REVENUE DEBIT

## 2018-01-26 PROCEDURE — 3331090001 HH PPS REVENUE CREDIT

## 2018-01-26 PROCEDURE — 3331090002 HH PPS REVENUE DEBIT

## 2018-01-27 PROCEDURE — 3331090001 HH PPS REVENUE CREDIT

## 2018-01-27 PROCEDURE — 3331090002 HH PPS REVENUE DEBIT

## 2018-01-28 PROCEDURE — 3331090001 HH PPS REVENUE CREDIT

## 2018-01-28 PROCEDURE — 3331090002 HH PPS REVENUE DEBIT

## 2018-01-29 PROCEDURE — 3331090002 HH PPS REVENUE DEBIT

## 2018-01-29 PROCEDURE — 3331090001 HH PPS REVENUE CREDIT

## 2018-01-30 PROCEDURE — 3331090002 HH PPS REVENUE DEBIT

## 2018-01-30 PROCEDURE — 3331090001 HH PPS REVENUE CREDIT

## 2018-01-31 ENCOUNTER — HOME CARE VISIT (OUTPATIENT)
Dept: SCHEDULING | Facility: HOME HEALTH | Age: 83
End: 2018-01-31
Payer: MEDICARE

## 2018-01-31 ENCOUNTER — HOME CARE VISIT (OUTPATIENT)
Dept: HOME HEALTH SERVICES | Facility: HOME HEALTH | Age: 83
End: 2018-01-31
Payer: MEDICARE

## 2018-01-31 PROCEDURE — G0299 HHS/HOSPICE OF RN EA 15 MIN: HCPCS

## 2018-01-31 PROCEDURE — 3331090001 HH PPS REVENUE CREDIT

## 2018-01-31 PROCEDURE — 3331090002 HH PPS REVENUE DEBIT

## 2018-02-03 VITALS
OXYGEN SATURATION: 96 % | HEART RATE: 60 BPM | RESPIRATION RATE: 16 BRPM | SYSTOLIC BLOOD PRESSURE: 146 MMHG | DIASTOLIC BLOOD PRESSURE: 80 MMHG | TEMPERATURE: 97.4 F

## 2018-03-29 ENCOUNTER — APPOINTMENT (OUTPATIENT)
Dept: GENERAL RADIOLOGY | Age: 83
End: 2018-03-29
Attending: EMERGENCY MEDICINE
Payer: MEDICARE

## 2018-03-29 ENCOUNTER — HOSPITAL ENCOUNTER (EMERGENCY)
Age: 83
Discharge: HOME | End: 2018-03-29
Attending: EMERGENCY MEDICINE
Payer: MEDICARE

## 2018-03-29 VITALS
RESPIRATION RATE: 16 BRPM | TEMPERATURE: 98.1 F | SYSTOLIC BLOOD PRESSURE: 168 MMHG | HEIGHT: 63 IN | DIASTOLIC BLOOD PRESSURE: 68 MMHG | BODY MASS INDEX: 21.76 KG/M2 | HEART RATE: 88 BPM | WEIGHT: 122.8 LBS | OXYGEN SATURATION: 100 %

## 2018-03-29 DIAGNOSIS — S70.02XA CONTUSION OF LEFT HIP, INITIAL ENCOUNTER: Primary | ICD-10-CM

## 2018-03-29 PROCEDURE — 73502 X-RAY EXAM HIP UNI 2-3 VIEWS: CPT

## 2018-03-29 PROCEDURE — 74011250637 HC RX REV CODE- 250/637: Performed by: EMERGENCY MEDICINE

## 2018-03-29 PROCEDURE — 99283 EMERGENCY DEPT VISIT LOW MDM: CPT

## 2018-03-29 RX ORDER — ACETAMINOPHEN 325 MG/1
650 TABLET ORAL ONCE
Status: COMPLETED | OUTPATIENT
Start: 2018-03-29 | End: 2018-03-29

## 2018-03-29 RX ADMIN — ACETAMINOPHEN 650 MG: 325 TABLET, FILM COATED ORAL at 09:45

## 2018-03-29 NOTE — DISCHARGE INSTRUCTIONS
Preventing Falls: Care Instructions  Your Care Instructions    Getting around your home safely can be a challenge if you have injuries or health problems that make it easy for you to fall. Loose rugs and furniture in walkways are among the dangers for many older people who have problems walking or who have poor eyesight. People who have conditions such as arthritis, osteoporosis, or dementia also have to be careful not to fall. You can make your home safer with a few simple measures. Follow-up care is a key part of your treatment and safety. Be sure to make and go to all appointments, and call your doctor if you are having problems. It's also a good idea to know your test results and keep a list of the medicines you take. How can you care for yourself at home? Taking care of yourself  · You may get dizzy if you do not drink enough water. To prevent dehydration, drink plenty of fluids, enough so that your urine is light yellow or clear like water. Choose water and other caffeine-free clear liquids. If you have kidney, heart, or liver disease and have to limit fluids, talk with your doctor before you increase the amount of fluids you drink. · Exercise regularly to improve your strength, muscle tone, and balance. Walk if you can. Swimming may be a good choice if you cannot walk easily. · Have your vision and hearing checked each year or any time you notice a change. If you have trouble seeing and hearing, you might not be able to avoid objects and could lose your balance. · Know the side effects of the medicines you take. Ask your doctor or pharmacist whether the medicines you take can affect your balance. Sleeping pills or sedatives can affect your balance. · Limit the amount of alcohol you drink. Alcohol can impair your balance and other senses. · Ask your doctor whether calluses or corns on your feet need to be removed.  If you wear loose-fitting shoes because of calluses or corns, you can lose your balance and fall. · Talk to your doctor if you have numbness in your feet. Preventing falls at home  · Remove raised doorway thresholds, throw rugs, and clutter. Repair loose carpet or raised areas in the floor. · Move furniture and electrical cords to keep them out of walking paths. · Use nonskid floor wax, and wipe up spills right away, especially on ceramic tile floors. · If you use a walker or cane, put rubber tips on it. If you use crutches, clean the bottoms of them regularly with an abrasive pad, such as steel wool. · Keep your house well lit, especially [place], and outside walkways. Use night-lights in areas such as hallways and bathrooms. Add extra light switches or use remote switches (such as switches that go on or off when you clap your hands) to make it easier to turn lights on if you have to get up during the night. · Install sturdy handrails on stairways. · Move items in your cabinets so that the things you use a lot are on the lower shelves (about waist level). · Keep a cordless phone and a flashlight with new batteries by your bed. If possible, put a phone in each of the main rooms of your house, or carry a cell phone in case you fall and cannot reach a phone. Or, you can wear a device around your neck or wrist. You push a button that sends a signal for help. · Wear low-heeled shoes that fit well and give your feet good support. Use footwear with nonskid soles. Check the heels and soles of your shoes for wear. Repair or replace worn heels or soles. · Do not wear socks without shoes on wood floors. · Walk on the grass when the sidewalks are slippery. If you live in an area that gets snow and ice in the winter, sprinkle salt on slippery steps and sidewalks. Preventing falls in the bath  · Install grab bars and nonskid mats inside and outside your shower or tub and near the toilet and sinks. · Use shower chairs and bath benches.   · Use a hand-held shower head that will allow you to sit while showering. · Get into a tub or shower by putting the weaker leg in first. Get out of a tub or shower with your strong side first.  · Repair loose toilet seats and consider installing a raised toilet seat to make getting on and off the toilet easier. · Keep your bathroom door unlocked while you are in the shower. Where can you learn more? Go to http://shauna-shar.info/. Enter [id number] [id number] in the search box to learn more about \"Preventing Falls: Care Instructions. \"  Current as of: May 12, 2017  Content Version: 11.4  © 4675-7787 [employer]. Care instructions adapted under license by [employer] (which disclaims liability or warranty for this information). If you have questions about a medical condition or this instruction, always ask your healthcare professional. [place] [address] any warranty or liability for your use of this information. Bruises: Care Instructions  Your Care Instructions    Bruises occur when small blood vessels under the skin tear or rupture, most often from a twist, bump, or fall. Blood leaks into tissues under the skin and causes a black-and-blue spot that often turns colors, including purplish black, reddish blue, or yellowish green, as the bruise heals. Bruises hurt, but most are not serious and will go away on their own within 2 to 4 weeks. Sometimes, gravity causes them to spread down the body. A leg bruise usually will take longer to heal than a bruise on the face or arms. Follow-up care is a key part of your treatment and safety. Be sure to make and go to all appointments, and call your doctor if you are having problems. It's also a good idea to know your test results and keep a list of the medicines you take. How can you care for yourself at home? · Take pain medicines exactly as directed. ¨ If the doctor gave you a prescription medicine for pain, take it as prescribed.   ¨ If you are not taking a prescription pain medicine, ask your doctor if you can take an over-the-counter medicine. · Put ice or a cold pack on the area for 10 to 20 minutes at a time. Put a thin cloth between the ice and your skin. · If you can, prop up the bruised area on pillows as much as possible for the next few days. Try to keep the bruise above the level of your heart. When should you call for help? Call your doctor now or seek immediate medical care if:  ? · You have signs of infection, such as:  ¨ Increased pain, swelling, warmth, or redness. ¨ Red streaks leading from the bruise. ¨ Pus draining from the bruise. ¨ A fever. ? · You have a bruise on your leg and signs of a blood clot, such as:  ¨ Increasing redness and swelling along with warmth, tenderness, and pain in the bruised area. ¨ Pain in your calf, back of the knee, thigh, or groin. ¨ Redness and swelling in your leg or groin. ? · Your pain gets worse. ? Watch closely for changes in your health, and be sure to contact your doctor if:  ? · You do not get better as expected. Where can you learn more? Go to http://shauna-shar.info/. Enter [phone] in the search box to learn more about \"Bruises: Care Instructions. \"  Current as of: March 20, 2017  Content Version: 11.4  © 1933-1922 [employer]. Care instructions adapted under license by [employer] (which disclaims liability or warranty for this information). If you have questions about a medical condition or this instruction, always ask your healthcare professional. [place] [address] any warranty or liability for your use of this information.

## 2018-03-29 NOTE — ED TRIAGE NOTES
Patient compains of left leg swelling x 2 days. Family member states patient had a fall 2 days ago and landed on her left hip.

## 2018-03-29 NOTE — ED PROVIDER NOTES
EMERGENCY DEPARTMENT HISTORY AND PHYSICAL EXAM      Date: 3/29/2018  Patient Name: Zari Glynn    History of Presenting Illness     Chief Complaint   Patient presents with    Leg Swelling       History Provided By: Patient    HPI: Zari Glynn, 80 y.o. female with PMHx significant for dementia and hypertension, presents ambulatory to the ED with cc of constant, moderate left hip pain that onset after ground level fall 2 days ago when the family dog jumped up and knocked her over. Family notes she has been ambulatory since the fall, but with exacerbation of pain. Family additionally expresses concern for swelling around her left hip, but denies any lower leg swelling. Per medical records, the pt was evaluated for a fall in the [facility] ED 3 months ago. Pt denies any weakness. PCP: Analia Burton MD    There are no other complaints, changes, or physical findings at this time. Current Facility-Administered Medications   Medication Dose Route Frequency Provider Last Rate Last Dose    acetaminophen (TYLENOL) tablet 650 mg  650 mg Oral ONCE Zeina Charles MD         Current Outpatient Prescriptions   Medication Sig Dispense Refill    acetaminophen (TYLENOL ARTHRITIS PAIN) 650 mg TbER Take 650 mg by mouth daily.  QUEtiapine (SEROQUEL) 25 mg tablet Take 25 mg by mouth nightly. Past History     Past Medical History:  Past Medical History:   Diagnosis Date    Hypertension        Past Surgical History:  History reviewed. No pertinent surgical history. Family History:  History reviewed. No pertinent family history. Social History:  Social History   Substance Use Topics    Smoking status: Never Smoker    Smokeless tobacco: Never Used    Alcohol use No       Allergies:  No Known Allergies      Review of Systems   Review of Systems   Constitutional: Negative for chills and fever. HENT: Negative for congestion, rhinorrhea, sneezing and sore throat.     Eyes: Negative for redness and visual disturbance. Respiratory: Negative for shortness of breath. Cardiovascular: Negative for chest pain and leg swelling. Gastrointestinal: Negative for abdominal pain, nausea and vomiting. Genitourinary: Negative for difficulty urinating and frequency. Musculoskeletal: Positive for arthralgias (left hip pain). Negative for back pain, myalgias and neck pain. Skin: Negative for rash. Neurological: Negative for dizziness, syncope, weakness and headaches. Hematological: Negative for adenopathy. All other systems reviewed and are negative. Physical Exam   Physical Exam   Constitutional: She is oriented to person, place, and time. She appears well-developed and well-nourished. HENT:   Head: Normocephalic. Mouth/Throat: Oropharynx is clear and moist.   Eyes: Conjunctivae and EOM are normal. Pupils are equal, round, and reactive to light. Neck: Normal range of motion. Neck supple. Cardiovascular: Normal rate, regular rhythm, normal heart sounds and intact distal pulses. Pulmonary/Chest: Effort normal and breath sounds normal.   Abdominal: Soft. Bowel sounds are normal. She exhibits no distension. There is no rebound. Musculoskeletal: Normal range of motion. She exhibits tenderness (left hip). She exhibits no edema or deformity. No edema of left leg noted. Neurological: She is alert and oriented to person, place, and time. Skin: Skin is warm and dry. Psychiatric: She has a normal mood and affect. Her behavior is normal. Judgment and thought content normal.   Nursing note and vitals reviewed. Diagnostic Study Results     Radiologic Studies -   Study Result      INDICATION:  Trauma      AP view of the pelvis and lateral view of the left hip.     No acute fracture or dislocation is visualized. There is mild narrowing of the  hip joint spaces. Bones are diffusely demineralized.  Soft tissues are normal.     IMPRESSION  IMPRESSION: No evidence of acute fracture or dislocation. Medical Decision Making   I am the first provider for this patient. I reviewed the vital signs, available nursing notes, past medical history, past surgical history, family history and social history. Vital Signs-Reviewed the patient's vital signs. Patient Vitals for the past 12 hrs:   Temp Pulse Resp BP SpO2   03/29/18 0830 98.1 °F (36.7 °C) 88 16 168/68 100 %       Pulse Oximetry Analysis - 100% on room air    Records Reviewed: Nursing Notes and Old Medical Records    Provider Notes (Medical Decision Making):   DDx: fracture, sprain, strain, contusion     ED Course:   Initial assessment performed. The patients presenting problems have been discussed, and they are in agreement with the care plan formulated and outlined with them. I have encouraged them to ask questions as they arise throughout their visit. Disposition:  DISCHARGE NOTE  9:39 AM  The patient has been re-evaluated and is ready for discharge. Reviewed available results with patient. Counseled pt on diagnosis and care plan. Pt has expressed understanding, and all questions have been answered. Pt agrees with plan and agrees to follow up as recommended, or return to the ED if their symptoms worsen. Discharge instructions have been provided and explained to the pt, along with reasons to return to the ED. PLAN:  1. Current Discharge Medication List        2. Follow-up Information     Follow up With Details Comments Janet Boyle III, MD Call  [address] [facility] [address] [facility] EMERGENCY DEPT  As needed, If symptoms worsen [address] [place]  [phone]        Return to ED if worse     Diagnosis     Clinical Impression:   1. Contusion of left hip, initial encounter        Attestations: This note is prepared by Alaina Baca.  Naren Alvarado, acting as Scribe for Adrienne Luke MD.    Adrienne Luke MD: The scribe's documentation has been prepared under my direction and personally reviewed by me in its entirety. I confirm that the note above accurately reflects all work, treatment, procedures, and medical decision making performed by me.

## 2018-03-29 NOTE — ED NOTES
Pt's son given printed discharge instructions and 0 script(s). Pt's son verbalized understanding of instructions and the importance of following up with PCP. Pt alert and oriented, in no acute distress, ambulatory with her sons. Pt states, \"I'm going to [facility] to spend the day with my sister. \" Pt's son states, \"mom, your sister is dead. \"

## 2018-03-29 NOTE — ED NOTES
Patient complains of left hip pain x 2 days. Patient's family member states the patient fell in her home earlier this week, could not give an exact date. Patient and son are both poor historians. Patient has not taken any medications for her pain. Emergency Department Nursing Plan of Care       The Nursing Plan of Care is developed from the Nursing assessment and Emergency Department Attending provider initial evaluation. The plan of care may be reviewed in the ED Provider note.     The Plan of Care was developed with the following considerations:   Patient / Family readiness to learn indicated by:verbalized understanding  Persons(s) to be included in education: patient and family  Barriers to Learning/Limitations:Cognitive/physical impairment:dementia, rapid speech (son)    Signed     Rajani Khan    3/29/2018   9:00 AM

## 2018-09-03 ENCOUNTER — APPOINTMENT (OUTPATIENT)
Dept: CT IMAGING | Age: 83
DRG: 194 | End: 2018-09-03
Attending: EMERGENCY MEDICINE
Payer: MEDICARE

## 2018-09-03 ENCOUNTER — HOSPITAL ENCOUNTER (INPATIENT)
Age: 83
LOS: 4 days | Discharge: NURSING, REHAB OR LONG-TERM CARE | DRG: 194 | End: 2018-09-08
Attending: EMERGENCY MEDICINE | Admitting: FAMILY MEDICINE
Payer: MEDICARE

## 2018-09-03 DIAGNOSIS — R10.9 ACUTE ABDOMINAL PAIN: ICD-10-CM

## 2018-09-03 DIAGNOSIS — R06.02 SHORTNESS OF BREATH: ICD-10-CM

## 2018-09-03 DIAGNOSIS — R60.0 BILATERAL LEG EDEMA: ICD-10-CM

## 2018-09-03 DIAGNOSIS — E86.0 DEHYDRATION: ICD-10-CM

## 2018-09-03 DIAGNOSIS — J18.9 COMMUNITY ACQUIRED PNEUMONIA, UNSPECIFIED LATERALITY: Primary | ICD-10-CM

## 2018-09-03 DIAGNOSIS — E88.09 HYPOALBUMINEMIA: ICD-10-CM

## 2018-09-03 DIAGNOSIS — R62.7 FAILURE TO THRIVE IN ADULT: ICD-10-CM

## 2018-09-03 LAB
ALBUMIN SERPL-MCNC: 2.9 G/DL (ref 3.5–5)
ALBUMIN/GLOB SERPL: 0.5 {RATIO} (ref 1.1–2.2)
ALP SERPL-CCNC: 93 U/L (ref 45–117)
ALT SERPL-CCNC: 46 U/L (ref 12–78)
ANION GAP SERPL CALC-SCNC: 12 MMOL/L (ref 5–15)
AST SERPL-CCNC: 62 U/L (ref 15–37)
BASOPHILS # BLD: 0 K/UL (ref 0–0.1)
BASOPHILS NFR BLD: 0 % (ref 0–1)
BILIRUB SERPL-MCNC: 0.5 MG/DL (ref 0.2–1)
BUN SERPL-MCNC: 34 MG/DL (ref 6–20)
BUN/CREAT SERPL: 37 (ref 12–20)
CALCIUM SERPL-MCNC: 9.1 MG/DL (ref 8.5–10.1)
CHLORIDE SERPL-SCNC: 107 MMOL/L (ref 97–108)
CK MB CFR SERPL CALC: 0.2 % (ref 0–2.5)
CK MB SERPL-MCNC: 1.9 NG/ML (ref 5–25)
CK SERPL-CCNC: 824 U/L (ref 26–192)
CO2 SERPL-SCNC: 26 MMOL/L (ref 21–32)
COMMENT, HOLDF: NORMAL
CREAT SERPL-MCNC: 0.92 MG/DL (ref 0.55–1.02)
DIFFERENTIAL METHOD BLD: ABNORMAL
EOSINOPHIL # BLD: 0.1 K/UL (ref 0–0.4)
EOSINOPHIL NFR BLD: 1 % (ref 0–7)
ERYTHROCYTE [DISTWIDTH] IN BLOOD BY AUTOMATED COUNT: 12.4 % (ref 11.5–14.5)
GLOBULIN SER CALC-MCNC: 5.4 G/DL (ref 2–4)
GLUCOSE SERPL-MCNC: 104 MG/DL (ref 65–100)
HCT VFR BLD AUTO: 38.7 % (ref 35–47)
HGB BLD-MCNC: 12 G/DL (ref 11.5–16)
IMM GRANULOCYTES # BLD: 0 K/UL (ref 0–0.04)
IMM GRANULOCYTES NFR BLD AUTO: 0 % (ref 0–0.5)
LACTATE SERPL-SCNC: 1.1 MMOL/L (ref 0.4–2)
LYMPHOCYTES # BLD: 1.2 K/UL (ref 0.8–3.5)
LYMPHOCYTES NFR BLD: 12 % (ref 12–49)
MCH RBC QN AUTO: 28.3 PG (ref 26–34)
MCHC RBC AUTO-ENTMCNC: 31 G/DL (ref 30–36.5)
MCV RBC AUTO: 91.3 FL (ref 80–99)
MONOCYTES # BLD: 1.1 K/UL (ref 0–1)
MONOCYTES NFR BLD: 11 % (ref 5–13)
NEUTS SEG # BLD: 7.6 K/UL (ref 1.8–8)
NEUTS SEG NFR BLD: 76 % (ref 32–75)
NRBC # BLD: 0 K/UL (ref 0–0.01)
NRBC BLD-RTO: 0 PER 100 WBC
PLATELET # BLD AUTO: 332 K/UL (ref 150–400)
PMV BLD AUTO: 10.6 FL (ref 8.9–12.9)
POTASSIUM SERPL-SCNC: 3.9 MMOL/L (ref 3.5–5.1)
PROT SERPL-MCNC: 8.3 G/DL (ref 6.4–8.2)
RBC # BLD AUTO: 4.24 M/UL (ref 3.8–5.2)
SAMPLES BEING HELD,HOLD: NORMAL
SODIUM SERPL-SCNC: 145 MMOL/L (ref 136–145)
TROPONIN I SERPL-MCNC: <0.05 NG/ML
WBC # BLD AUTO: 9.9 K/UL (ref 3.6–11)

## 2018-09-03 PROCEDURE — 83605 ASSAY OF LACTIC ACID: CPT | Performed by: EMERGENCY MEDICINE

## 2018-09-03 PROCEDURE — 99285 EMERGENCY DEPT VISIT HI MDM: CPT

## 2018-09-03 PROCEDURE — 82550 ASSAY OF CK (CPK): CPT | Performed by: EMERGENCY MEDICINE

## 2018-09-03 PROCEDURE — 85025 COMPLETE CBC W/AUTO DIFF WBC: CPT | Performed by: EMERGENCY MEDICINE

## 2018-09-03 PROCEDURE — 80053 COMPREHEN METABOLIC PANEL: CPT | Performed by: EMERGENCY MEDICINE

## 2018-09-03 PROCEDURE — 74011250636 HC RX REV CODE- 250/636: Performed by: EMERGENCY MEDICINE

## 2018-09-03 PROCEDURE — 74176 CT ABD & PELVIS W/O CONTRAST: CPT

## 2018-09-03 PROCEDURE — 96361 HYDRATE IV INFUSION ADD-ON: CPT

## 2018-09-03 PROCEDURE — 36415 COLL VENOUS BLD VENIPUNCTURE: CPT | Performed by: EMERGENCY MEDICINE

## 2018-09-03 PROCEDURE — 81001 URINALYSIS AUTO W/SCOPE: CPT | Performed by: EMERGENCY MEDICINE

## 2018-09-03 PROCEDURE — 84484 ASSAY OF TROPONIN QUANT: CPT | Performed by: EMERGENCY MEDICINE

## 2018-09-03 PROCEDURE — 87086 URINE CULTURE/COLONY COUNT: CPT | Performed by: EMERGENCY MEDICINE

## 2018-09-03 PROCEDURE — 87040 BLOOD CULTURE FOR BACTERIA: CPT | Performed by: EMERGENCY MEDICINE

## 2018-09-03 PROCEDURE — 93005 ELECTROCARDIOGRAM TRACING: CPT

## 2018-09-03 PROCEDURE — 93971 EXTREMITY STUDY: CPT

## 2018-09-03 RX ORDER — LEVOFLOXACIN 5 MG/ML
500 INJECTION, SOLUTION INTRAVENOUS
Status: COMPLETED | OUTPATIENT
Start: 2018-09-03 | End: 2018-09-04

## 2018-09-03 RX ADMIN — SODIUM CHLORIDE 1000 ML: 900 INJECTION, SOLUTION INTRAVENOUS at 21:00

## 2018-09-03 NOTE — IP AVS SNAPSHOT
[address] [place] [address] 
[phone] Patient: Aicha George MRN: [id number] KCI:0/3/6810 You are allergic to the following No active allergies Recent Documentation Height Weight BMI OB Status Smoking Status 1.524 m 56.1 kg 24.15 kg/m2 Postmenopausal Never Smoker Unresulted Labs-Please follow up with your PCP about these lab tests Order Current Status CULTURE, BLOOD, PAIRED Preliminary result Emergency Contacts  (Rel.) Home Phone Work Phone Mobile Phone [employer] ([address]) [phone] -- -- Yanick Burns (Child) [phone] -- -- Josue Mao (Child) -- -- -- About your hospitalization You were admitted on:  September 4, 2018 You last received care in the:  [facility] You were discharged on:  September 8, 2018 Why you were hospitalized Your primary diagnosis was:  Bilateral Pneumonia Your diagnoses also included:  Right Hip Pain, Pleural Effusion, Right Providers Seen During Your Hospitalization Provider Specialty Primary office phone Asya Wall MD Emergency Medicine [phone] Ana Luisa Barrios MD Family Practice [phone] Brayden Dunbar MD Internal Medicine [phone] Evi Cerna MD Internal Medicine [phone] Sarah Oliver MD Internal Medicine [phone] Your Primary Care Physician (PCP) Primary Care Physician Office Phone Office Fax  
 Gm Hayden [phone] Follow-up Information Follow up With Details Comments Contact Info Koffi Yadav MD   [address] 
[phone] My Medications TAKE these medications as instructed Instructions Each Dose to Equal  
 Morning Noon Evening Bedtime  
 amLODIPine 10 mg tablet Commonly known as:  Merline Barker Start taking on:  9/9/2018 Your last dose was: Your next dose is: Take 1 Tab by mouth daily. 10 mg  
    
   
   
   
  
 amoxicillin-clavulanate 500-125 mg per tablet Commonly known as:  AUGMENTIN Your last dose was: Your next dose is: Take 1 Tab by mouth every eight (8) hours. 1 Tab  
    
   
   
   
  
 mirtazapine 30 mg tablet Commonly known as:  Stephen Dunn Your last dose was: Your next dose is: Take 1 Tab by mouth nightly. 30 mg  
    
   
   
   
  
 QUEtiapine 25 mg tablet Commonly known as:  SEROquel Your last dose was: Your next dose is: Take 25 mg by mouth nightly. 25 mg  
    
   
   
   
  
 TYLENOL ARTHRITIS PAIN 650 mg Tber Generic drug:  acetaminophen Your last dose was: Your next dose is: Take 650 mg by mouth daily. 650 mg Where to Get Your Medications These medications were sent to [id number] [place], [address] [place] [address] AT CORNER OF [address] [place] [address] [facility], [address] [place] [address] Phone:  [phone]  
  amLODIPine 10 mg tablet Information on where to get these meds will be given to you by the nurse or doctor. ! Ask your nurse or doctor about these medications  
  amoxicillin-clavulanate 500-125 mg per tablet  
 mirtazapine 30 mg tablet Discharge Instructions None Discharge Orders None Introducing [facility]! Sylvia Mcintyre introduces InMyShow patient portal. Now you can access parts of your medical record, email your doctor's office, and request medication refills online. 1. In your internet browser, go to [email or web address] 2. Click on the First Time User? Click Here link in the Sign In box. You will see the New Member Sign Up page. 3. Enter your InMyShow Access Code exactly as it appears below.  You will not need to use this code after youve completed the sign-up process. If you do not sign up before the expiration date, you must request a new code. · [employer] Access Code: [email or web address] Expires: 12/2/2018  8:34 PM 
 
4. Enter the last four digits of your Social Security Number (xxxx) and Date of Birth (mm/dd/yyyy) as indicated and click Submit. You will be taken to the next sign-up page. 5. Create a [employer] ID. This will be your [employer] login ID and cannot be changed, so think of one that is secure and easy to remember. 6. Create a [employer] password. You can change your password at any time. 7. Enter your Password Reset Question and Answer. This can be used at a later time if you forget your password. 8. Enter your e-mail address. You will receive e-mail notification when new information is available in [address] 9. Click Sign Up. You can now view and download portions of your medical record. 10. Click the Download Summary menu link to download a portable copy of your medical information. If you have questions, please visit the Frequently Asked Questions section of the [employer] website. Remember, [employer] is NOT to be used for urgent needs. For medical emergencies, dial 911. Now available from your iPhone and Android! General Information Please provide this summary of care documentation to your next provider. Patient Signature:  ____________________________________________________________ Date:  ____________________________________________________________  
  
Deborah Charter Provider Signature:  ____________________________________________________________ Date:  ____________________________________________________________

## 2018-09-03 NOTE — ED TRIAGE NOTES
Pt arrives via EMS from home for complaint of 2 days of constipation with abd pain. Also complains of R hip pain, had a GLF 2-3 days ago per family.

## 2018-09-04 ENCOUNTER — APPOINTMENT (OUTPATIENT)
Dept: GENERAL RADIOLOGY | Age: 83
DRG: 194 | End: 2018-09-04
Attending: FAMILY MEDICINE
Payer: MEDICARE

## 2018-09-04 ENCOUNTER — APPOINTMENT (OUTPATIENT)
Dept: GENERAL RADIOLOGY | Age: 83
DRG: 194 | End: 2018-09-04
Attending: INTERNAL MEDICINE
Payer: MEDICARE

## 2018-09-04 PROBLEM — J90 PLEURAL EFFUSION, RIGHT: Status: ACTIVE | Noted: 2018-09-04

## 2018-09-04 PROBLEM — J18.9 BILATERAL PNEUMONIA: Status: ACTIVE | Noted: 2018-09-04

## 2018-09-04 PROBLEM — M25.551 RIGHT HIP PAIN: Status: ACTIVE | Noted: 2018-09-04

## 2018-09-04 LAB
ANION GAP SERPL CALC-SCNC: 8 MMOL/L (ref 5–15)
APPEARANCE UR: CLEAR
ATRIAL RATE: 88 BPM
BACTERIA URNS QL MICRO: NEGATIVE /HPF
BILIRUB UR QL: NEGATIVE
BUN SERPL-MCNC: 29 MG/DL (ref 6–20)
BUN/CREAT SERPL: 43 (ref 12–20)
CALCIUM SERPL-MCNC: 8 MG/DL (ref 8.5–10.1)
CALCULATED P AXIS, ECG09: 63 DEGREES
CALCULATED R AXIS, ECG10: 44 DEGREES
CALCULATED T AXIS, ECG11: 28 DEGREES
CHLORIDE SERPL-SCNC: 111 MMOL/L (ref 97–108)
CO2 SERPL-SCNC: 26 MMOL/L (ref 21–32)
COLOR UR: ABNORMAL
CREAT SERPL-MCNC: 0.67 MG/DL (ref 0.55–1.02)
DIAGNOSIS, 93000: NORMAL
EPITH CASTS URNS QL MICRO: ABNORMAL /LPF
ERYTHROCYTE [DISTWIDTH] IN BLOOD BY AUTOMATED COUNT: 12.5 % (ref 11.5–14.5)
GLUCOSE SERPL-MCNC: 107 MG/DL (ref 65–100)
GLUCOSE UR STRIP.AUTO-MCNC: NEGATIVE MG/DL
HCT VFR BLD AUTO: 34.9 % (ref 35–47)
HGB BLD-MCNC: 10.9 G/DL (ref 11.5–16)
HGB UR QL STRIP: ABNORMAL
KETONES UR QL STRIP.AUTO: ABNORMAL MG/DL
LEUKOCYTE ESTERASE UR QL STRIP.AUTO: NEGATIVE
MCH RBC QN AUTO: 28.4 PG (ref 26–34)
MCHC RBC AUTO-ENTMCNC: 31.2 G/DL (ref 30–36.5)
MCV RBC AUTO: 90.9 FL (ref 80–99)
NITRITE UR QL STRIP.AUTO: NEGATIVE
NRBC # BLD: 0 K/UL (ref 0–0.01)
NRBC BLD-RTO: 0 PER 100 WBC
P-R INTERVAL, ECG05: 100 MS
PH UR STRIP: 6 [PH] (ref 5–8)
PLATELET # BLD AUTO: 305 K/UL (ref 150–400)
PMV BLD AUTO: 10.8 FL (ref 8.9–12.9)
POTASSIUM SERPL-SCNC: 3.9 MMOL/L (ref 3.5–5.1)
PROT UR STRIP-MCNC: 30 MG/DL
Q-T INTERVAL, ECG07: 372 MS
QRS DURATION, ECG06: 68 MS
QTC CALCULATION (BEZET), ECG08: 450 MS
RBC # BLD AUTO: 3.84 M/UL (ref 3.8–5.2)
RBC #/AREA URNS HPF: ABNORMAL /HPF (ref 0–5)
SODIUM SERPL-SCNC: 145 MMOL/L (ref 136–145)
SP GR UR REFRACTOMETRY: 1.02 (ref 1–1.03)
UA: UC IF INDICATED,UAUC: ABNORMAL
UROBILINOGEN UR QL STRIP.AUTO: 2 EU/DL (ref 0.2–1)
VENTRICULAR RATE, ECG03: 88 BPM
WBC # BLD AUTO: 8.9 K/UL (ref 3.6–11)
WBC URNS QL MICRO: ABNORMAL /HPF (ref 0–4)

## 2018-09-04 PROCEDURE — 74011000258 HC RX REV CODE- 258: Performed by: FAMILY MEDICINE

## 2018-09-04 PROCEDURE — 65270000032 HC RM SEMIPRIVATE

## 2018-09-04 PROCEDURE — 36415 COLL VENOUS BLD VENIPUNCTURE: CPT | Performed by: FAMILY MEDICINE

## 2018-09-04 PROCEDURE — 74011000258 HC RX REV CODE- 258: Performed by: INTERNAL MEDICINE

## 2018-09-04 PROCEDURE — 74011250637 HC RX REV CODE- 250/637: Performed by: INTERNAL MEDICINE

## 2018-09-04 PROCEDURE — 85027 COMPLETE CBC AUTOMATED: CPT | Performed by: FAMILY MEDICINE

## 2018-09-04 PROCEDURE — 74011250637 HC RX REV CODE- 250/637: Performed by: FAMILY MEDICINE

## 2018-09-04 PROCEDURE — 74011000250 HC RX REV CODE- 250: Performed by: INTERNAL MEDICINE

## 2018-09-04 PROCEDURE — 74011250636 HC RX REV CODE- 250/636: Performed by: FAMILY MEDICINE

## 2018-09-04 PROCEDURE — 73502 X-RAY EXAM HIP UNI 2-3 VIEWS: CPT

## 2018-09-04 PROCEDURE — 71045 X-RAY EXAM CHEST 1 VIEW: CPT

## 2018-09-04 PROCEDURE — 73562 X-RAY EXAM OF KNEE 3: CPT

## 2018-09-04 PROCEDURE — 80048 BASIC METABOLIC PNL TOTAL CA: CPT | Performed by: FAMILY MEDICINE

## 2018-09-04 PROCEDURE — 74011250636 HC RX REV CODE- 250/636: Performed by: EMERGENCY MEDICINE

## 2018-09-04 PROCEDURE — 96365 THER/PROPH/DIAG IV INF INIT: CPT

## 2018-09-04 RX ORDER — HYDRALAZINE HYDROCHLORIDE 20 MG/ML
10 INJECTION INTRAMUSCULAR; INTRAVENOUS
Status: DISCONTINUED | OUTPATIENT
Start: 2018-09-04 | End: 2018-09-08 | Stop reason: HOSPADM

## 2018-09-04 RX ORDER — AMLODIPINE BESYLATE 5 MG/1
5 TABLET ORAL DAILY
Status: DISCONTINUED | OUTPATIENT
Start: 2018-09-04 | End: 2018-09-07

## 2018-09-04 RX ORDER — QUETIAPINE FUMARATE 25 MG/1
25 TABLET, FILM COATED ORAL
Status: DISCONTINUED | OUTPATIENT
Start: 2018-09-04 | End: 2018-09-08 | Stop reason: HOSPADM

## 2018-09-04 RX ORDER — AMOXICILLIN 250 MG
1 CAPSULE ORAL DAILY
Status: DISCONTINUED | OUTPATIENT
Start: 2018-09-04 | End: 2018-09-08 | Stop reason: HOSPADM

## 2018-09-04 RX ORDER — SODIUM CHLORIDE 450 MG/100ML
75 INJECTION, SOLUTION INTRAVENOUS CONTINUOUS
Status: DISCONTINUED | OUTPATIENT
Start: 2018-09-04 | End: 2018-09-07

## 2018-09-04 RX ORDER — POLYETHYLENE GLYCOL 3350 17 G/17G
17 POWDER, FOR SOLUTION ORAL DAILY
Status: DISCONTINUED | OUTPATIENT
Start: 2018-09-04 | End: 2018-09-08 | Stop reason: HOSPADM

## 2018-09-04 RX ORDER — SODIUM CHLORIDE 0.9 % (FLUSH) 0.9 %
5-10 SYRINGE (ML) INJECTION AS NEEDED
Status: DISCONTINUED | OUTPATIENT
Start: 2018-09-04 | End: 2018-09-08 | Stop reason: HOSPADM

## 2018-09-04 RX ORDER — SODIUM CHLORIDE 450 MG/100ML
75 INJECTION, SOLUTION INTRAVENOUS ONCE
Status: DISCONTINUED | OUTPATIENT
Start: 2018-09-04 | End: 2018-09-04

## 2018-09-04 RX ORDER — SODIUM CHLORIDE 0.9 % (FLUSH) 0.9 %
5-10 SYRINGE (ML) INJECTION EVERY 8 HOURS
Status: DISCONTINUED | OUTPATIENT
Start: 2018-09-04 | End: 2018-09-08 | Stop reason: HOSPADM

## 2018-09-04 RX ADMIN — AMLODIPINE BESYLATE 5 MG: 5 TABLET ORAL at 09:21

## 2018-09-04 RX ADMIN — Medication 10 ML: at 21:39

## 2018-09-04 RX ADMIN — SODIUM CHLORIDE 75 ML/HR: 450 INJECTION, SOLUTION INTRAVENOUS at 15:26

## 2018-09-04 RX ADMIN — QUETIAPINE FUMARATE 25 MG: 25 TABLET ORAL at 03:32

## 2018-09-04 RX ADMIN — Medication 10 ML: at 14:00

## 2018-09-04 RX ADMIN — PIPERACILLIN SODIUM,TAZOBACTAM SODIUM 3.38 G: 3; .375 INJECTION, POWDER, FOR SOLUTION INTRAVENOUS at 19:30

## 2018-09-04 RX ADMIN — PIPERACILLIN SODIUM,TAZOBACTAM SODIUM 3.38 G: 3; .375 INJECTION, POWDER, FOR SOLUTION INTRAVENOUS at 03:36

## 2018-09-04 RX ADMIN — LEVOFLOXACIN 500 MG: 5 INJECTION, SOLUTION INTRAVENOUS at 00:10

## 2018-09-04 RX ADMIN — POLYETHYLENE GLYCOL 3350 17 G: 17 POWDER, FOR SOLUTION ORAL at 14:02

## 2018-09-04 RX ADMIN — Medication 10 ML: at 06:02

## 2018-09-04 RX ADMIN — Medication 1 TABLET: at 14:02

## 2018-09-04 RX ADMIN — PIPERACILLIN SODIUM,TAZOBACTAM SODIUM 3.38 G: 3; .375 INJECTION, POWDER, FOR SOLUTION INTRAVENOUS at 11:05

## 2018-09-04 RX ADMIN — QUETIAPINE FUMARATE 25 MG: 25 TABLET ORAL at 21:38

## 2018-09-04 NOTE — H&P
[address]  HISTORY AND PHYSICAL [place] 
MR#: [id number] : 1927 ACCOUNT #: [de-identified] ADMIT DATE: 2018 CHIEF COMPLAINT:  Patient does not provide. HISTORY OF PRESENT ILLNESS:  A 49-year-old -American female with past medical history of dementia, hypertension, general debility, presented to the emergency department via EMS with initial reported chief complaint of right hip pain. At current, the patient is mostly nonverbal and not answering any of my questions. As such, a majority of history has been obtained from review of ED medical reports. Per the collective reports, patient had initially complained of right hip pain radiating to her right knee. She reportedly normally uses a walker but has been unable to ambulate, yesterday requiring assistance. She reportedly had fallen approximately 2 days ago. It was uncertain if she actually hit her hip. She also reportedly had some complaints of abdominal pain, constipation, decreased oral intake. On arrival to the emergency department, initial recorded vital signs, blood pressure 155/95, heart rate of 94, respiratory rate of 18. Workup included CT abdomen and pelvis without contrast showed patchy bilateral pneumonia with trace right pleural effusion with mild colonic fecal burden. There was no acute process noted. Patient was started on levofloxacin 500 mg IV for antibiotic coverage. She is now seen for admission to the hospitalist service for continued evaluation and treatment. PAST MEDICAL HISTORY:   
1. Dementia. 2.  Hypertension. PAST SURGICAL HISTORY:  None reported. MEDICATIONS:  Complete medication list reviewed and noted on chart records. Taking Last Dose Start Date End Date Provider    
   acetaminophen (TYLENOL ARTHRITIS PAIN) 650 mg TbER    --  -- Usha Gray MD  
   Take 650 mg by mouth daily.    QUEtiapine (SEROQUEL) 25 mg tablet    --  -- Chanelle Hoover MD  
   Take 25 mg by mouth nightly.  
   
 
 
ALLERGIES:  NO KNOWN DRUG ALLERGIES. SOCIAL HISTORY:  No reports of smoking or alcohol. She is . FAMILY HISTORY:  Unknown and unable to obtain from the patient. REVIEW OF SYSTEMS:  Unable to obtain review of systems. Patient not answering questions appropriately. PHYSICAL EXAMINATION:   
VITAL SIGNS:  Temperature 99.2 degrees Fahrenheit, blood pressure of 153/55, heart rate 81, respiratory rate of 16, O2 saturation 92% on room air, recorded weight of 123 pounds (56.1 kg), recorded height of 5 feet 0 inches tall. GENERAL:  Elderly female in no acute respiratory distress. PSYCHIATRIC:  The patient is awake but disoriented, not answering questions appropriately, slightly anxious. NEUROLOGIC:  GCS of 13, best response (E4, V3, M6) with spontaneous eye opening, confused speech, and only follows some but not all commands. Generalized weakness. Wiggles toes but unable to lift legs. Sensation grossly intact, withdrawals to tactile stimuli with slow but not slurred speech. No facial droop. HEENT:  Normocephalic, atraumatic. Pupils are 2 mm reactive. Sclerae are anicteric. Conjunctivae are clear. Nares are patent. Oropharynx clear. Tongue is midline, not edematous. NECK:  Supple, without lymphadenopathy, JVD, carotid bruits, or thyromegaly. LYMPHATICS:  Negative for cervical or supraclavicular adenopathy. RESPIRATORY:  Lungs clear to auscultation bilaterally. CARDIOVASCULAR:  Heart is regular rhythm. Normal S1, S2, without murmurs, rubs, or gallops. GASTROINTESTINAL:  Abdomen soft, nontender, nondistended. Normoactive bowel sounds. No rebound, guarding, rigidity. No auscultated abdominal bruits, pulsatile abdominal mass. BACK:  No CVA tenderness. No step-off deformity. MUSCULOSKELETAL:  No acute palpable bony deformity. Negative for calf tenderness. VASCULAR:  2+ radial, 1+ dorsalis pedis pulses without cyanosis and clubbing. Nonpitting edema bilateral lower extremities. SKIN:  Warm and dry. LABORATORY DATA:  Labs have been reviewed. IMPRESSION AND PLAN:   
1. Pneumonia -- bilateral.  Continue patient on IV antibiotics. The patient had been given levofloxacin per the emergency department. However, there is drug-drug interaction between levofloxacin and Seroquel, which she takes at home. As such, will change the patient's antibiotics over to Zosyn for IV antibiotic coverage. Check blood cultures, adjust antibiotics accordingly. 2.  Right hip pain. CT abdomen and pelvis is unremarkable. Ordered right hip x-ray for further evaluation of the same. 3.  Right pleural effusion. Oxygenating well. Continue with pulse oximetry monitoring, provide supplemental oxygen if needed. 4.  Constipation. Place on bowel regimen. 5.  Lower extremity edema. Venous duplex of lower extremities negative for deep vein thromboses. 6.  Abdominal pain. Again CT abdomen and pelvis unremarkable for acute process. 7.  History of recent fall. Place on fall precautions. 8.  Dementia. Consider for therapy. Not currently on any meds for the same. 9.  Hypertension. Monitor blood pressure and provide antihypertensive meds for the same. 10.  Generalized weakness/debility. Place on fall precautions. 11.  Venous thromboembolism prophylaxis. Sequential compression devices to lower extremities. MD SHELLEY Villegas/ 
D: 09/04/2018 05:01    
T: 09/04/2018 05:32 
JOB #: [id number]

## 2018-09-04 NOTE — PROCEDURES
[address]  *** FINAL REPORT ***    Name: Danilo Phillip  MRN: [id number]  : 08 Aug 1927  HIS Order #: [id number]  [address] [facility] Visit #: [id number]  Date: 03 Sep 2018    TYPE OF TEST: Peripheral Venous Testing    REASON FOR TEST  Pain in limb, Limb swelling    Right Leg:-  Deep venous thrombosis:           No  Superficial venous thrombosis:    No  Deep venous insufficiency:        Not examined  Superficial venous insufficiency: Not examined      INTERPRETATION/FINDINGS  PROCEDURE:  Color duplex ultrasound imaging of lower extremity veins. FINDINGS:       Right: The common femoral, deep femoral, femoral, popliteal,  posterior tibial, peroneal, and great saphenous are patent and without   evidence of thrombus; each is is fully compressible and there is no  narrowing of the flow channel on color Doppler imaging. Phasic flow  is observed in the common femoral vein. Left:   The common femoral vein is patent and without evidence of   thrombus. Phasic flow is observed. This extremity was not otherwise   evaluated. IMPRESSION:  No evidence of right lower extremity vein thrombosis. ADDITIONAL COMMENTS  There is an incidental finding of a popliteal fossa fluid collection. I have personally reviewed the data relevant to the interpretation of  this  study.     TECHNOLOGIST: SHOSHANA Jones, GE  Signed: 2018 11:20 PM    PHYSICIAN: Jacklyn Mckee MD  Signed: 2018 07:16 AM

## 2018-09-04 NOTE — ROUTINE PROCESS
TRANSFER - OUT REPORT: 
 
Verbal report given to RN(name) on Governor Bryson  being transferred to 6E(unit) for routine progression of care Report consisted of patients Situation, Background, Assessment and  
Recommendations(SBAR). Information from the following report(s) SBAR and ED Summary was reviewed with the receiving nurse. Lines:  
Peripheral IV 09/04/18 Left Antecubital (Active) Opportunity for questions and clarification was provided. Patient transported with: 
 [employer]

## 2018-09-04 NOTE — CDMP QUERY
Please clarify if this patient is (was) being treated/managed for:  
 
=> Rhabdomyolysis POA in the setting of fall/decreased activity requiring IV fluids/lab monitoring  
=> Other explanation of clinical findings 
=> Clinically Undetermined (no explanation for clinical findings) The medical record reflects the following clinical findings, treatment, and risk factors. Risk Factors:  Pt also reportedly fell 2 days ago Clinical Indicators:  serum  Treatment: 1 lt NS bolus, lab monitoring Please clarify and document your clinical opinion in the progress notes and discharge summary including the definitive and/or presumptive diagnosis, (suspected or probable), related to the above clinical findings. Please include clinical findings supporting your diagnosis. Thank you, 
       Kim Lord [address] [facility], [address] [facility]

## 2018-09-04 NOTE — PROGRESS NOTES
Primary Nurse Meryle Mote, RN and Don Brian, RN performed a dual skin assessment on this patient No impairment noted Rivas score is 15 Redness was noted on the sacrum and a scab was present on both elbows

## 2018-09-04 NOTE — ED PROVIDER NOTES
HPI Comments: 80 y.o. female with past medical history significant for HTN, dementia who presents from home via EMS with chief complaint of hip pain. Pt c/o R hip pain with radiation to her R knee. Pt normally walks with a walker, but has been unable to do that today and requires a lot of assistance in order to get up off of the couch. Pt also reportedly fell 2 days ago, but is unsure if she hit her hip. Pt also c/o abdominal pain and constipation, accompanied by decreased PO intake. There are no other acute medical concerns at this time. Social hx: denies tobacco use, denies EtOH consumption PCP: Lisa Lemos MD 
 
Full history, physical exam, and ROS unable to be obtained due to:  dementia. Note written by Carline Mcdonald, as dictated by Anand Hull MD 8:03 PM 
 
 
The history is provided by the patient. No  was used. Past Medical History:  
Diagnosis Date  Dementia  Hypertension History reviewed. No pertinent surgical history. History reviewed. No pertinent family history. Social History Social History  Marital status:  Spouse name: N/A  
 Number of children: N/A  
 Years of education: N/A Occupational History  Not on file. Social History Main Topics  Smoking status: Never Smoker  Smokeless tobacco: Never Used  Alcohol use No  
 Drug use: No  
 Sexual activity: Not on file Other Topics Concern  Not on file Social History Narrative ALLERGIES: Review of patient's allergies indicates no known allergies. Review of Systems Unable to perform ROS: Dementia There were no vitals filed for this visit. Physical Exam  
Constitutional: She appears well-developed and well-nourished. No distress. HENT:  
Head: Normocephalic. Eyes: Pupils are equal, round, and reactive to light. Neck: Normal range of motion. Cardiovascular: Normal rate and regular rhythm. No murmur heard. Pulmonary/Chest: Effort normal and breath sounds normal. No respiratory distress. Abdominal: Soft. There is no tenderness. Musculoskeletal: Normal range of motion. R leg 2+ diffuse edema. Pulses intact. Pain with movement of R hip. Neurological: She is alert. She has normal strength. No cranial nerve deficit. Skin: Skin is warm and dry. Psychiatric: She has a normal mood and affect. Her behavior is normal.  
Nursing note and vitals reviewed. Note written by Carline Cartwright, as dictated by Alberto Christianson MD 8:13 PM 
 
 
 
[facility] 
 
 
ED Course Procedures CONSULT NOTE: 
10:11 PM Alberto Christianson MD spoke with Dr. Andrews Service, Consult for Hospitalist.  Discussed available diagnostic tests and clinical findings. Dr. Andrews Service will see and admit pt for pneumonia, dehydration and failure to thrive.

## 2018-09-04 NOTE — PROGRESS NOTES
Patient has order for tap water enema. Patient came in at 0200 this morning and is exhausted and sleeping. Per Dr. Ayse Wong, there is no rush on the enema.   Letting patient rest.

## 2018-09-04 NOTE — PROGRESS NOTES
Hospitalist Progress Note Yonis Munoz MD 
Answering service: [phone] OR [phone] from in house phone Date of Service:  2018 NAME:  Agata Ricci :  1927 MRN:  [id number] Admission Summary:  
A 54-year-old -American female with past medical history of dementia, hypertension, general debility, presented to the emergency department via EMS with initial reported chief complaint of right hip pain. At current, the patient is mostly nonverbal and not answering any of my questions. As such, a majority of history has been obtained from review of ED medical reports. Per the collective reports, patient had initially complained of right hip pain radiating to her right knee. She reportedly normally uses a walker but has been unable to ambulate, yesterday requiring assistance. She reportedly had fallen approximately 2 days ago. It was uncertain if she actually hit her hip. She also reportedly had some complaints of abdominal pain, constipation, decreased oral intake. On arrival to the emergency department, initial recorded vital signs, blood pressure 155/95, heart rate of 94, respiratory rate of 18. Workup included CT abdomen and pelvis without contrast showed patchy bilateral pneumonia with trace right pleural effusion with mild colonic fecal burden. There was no acute process noted. Patient was started on levofloxacin 500 mg IV for antibiotic coverage. Interval history / Subjective:  
  Patient seen and examined; family members in the room. Patient sleepy but easily arousable, poor historian. Assessment & Plan: #. Right leg pain/knee swelling, unclear cause, possible pseudogout. -LE doppler negative for DVT, but reported incidental finding of a popliteal fossa fluid collection (unclear significance) 
-Obtain right knee xray. #. Abnormal CXR and CT reported Patchy bilateral pneumonia -Patient without respiratory symptoms, no cough/SOB, fever/chills or leukocytosis. -Noted patient coughs during drinking/eating, high risk of aspiration, speech/swallow consult. 
-Currently on IV Zosyn, will continue for 1-2more days and switch to oral. 
 
#. Constipation: per pt's family members last BM few days ago. CT reported mild colonic fecal burden. Will give tap water enema now, and start Senna-Docusate and miralax. #. Recent fall; needs PT/OT #. Mild rhabdomyolysis (POA): could be related to recent fall and dehydration 
-Continue gentle IVF #. Dementia: supportive care. Code status: full DVT prophylaxis: SCD Care Plan discussed with: Patient/Family and Nurse Disposition: TBD Hospital Problems  Date Reviewed: 9/4/2018 Codes Class Noted POA Right hip pain ICD-10-CM: M25.551 ICD-9-CM: 719.45  9/4/2018 Unknown * (Principal)Bilateral pneumonia ICD-10-CM: J18.9 ICD-9-CM: 988  9/4/2018 Unknown Pleural effusion, right ICD-10-CM: J90 ICD-9-CM: 511.9  9/4/2018 Unknown Review of Systems:  
Review of systems not obtained due to patient factors. Vital Signs:  
 Last 24hrs VS reviewed since prior progress note. Most recent are: 
Visit Vitals  /69 (BP 1 Location: Left arm, BP Patient Position: At rest)  Pulse 88  Temp 98.2 °F (36.8 °C)  Resp 18  Ht 5' (1.524 m)  Wt 56.1 kg (123 lb 10.9 oz)  SpO2 99%  BMI 24.15 kg/m2 No intake or output data in the 24 hours ending 09/04/18 1350 Physical Examination:  
 
 
     
Constitutional:  sleepy but easily arousable, in NAD  
ENT:  Oral mucous moist, oropharynx benign. Neck supple, Resp:  CTA bilaterally. No wheezing/rhonchi/rales. No accessory muscle use CV:  Regular rhythm, normal rate, no murmurs, gallops, rubs GI:  Soft, non distended, non tender. normoactive bowel sounds, no hepatosplenomegaly Musculoskeletal: Right knee area swelling, tender and limited range of motion/passively Neurologic:  Moves all extremities. AAOx0 Data Review:  
 Review and/or order of clinical lab test 
Review and/or order of tests in the radiology section of [facility] Labs:  
 
Recent Labs  
   09/04/18 0127 09/03/18 2025 WBC  8.9  9.9 HGB  10.9*  12.0 HCT  34.9*  38.7 PLT  305  332 Recent Labs  
   09/04/18 0127 09/03/18 2025 NA  145  145  
K  3.9  3.9 CL  111*  107 CO2  26  26 BUN  29*  34* CREA  0.67  0.92  
GLU  107*  104* CA  8.0*  9.1 Recent Labs  
   09/03/18 2025 SGOT  62* ALT  46 AP  93 TBILI  0.5 TP  8.3* ALB  2.9*  
GLOB  5.4* No results for input(s): INR, PTP, APTT in the last 72 hours. No lab exists for component: INREXT No results for input(s): FE, TIBC, PSAT, FERR in the last 72 hours. No results found for: FOL, RBCF No results for input(s): PH, PCO2, PO2 in the last 72 hours. Recent Labs  
   09/03/18 2025 CPK  824* CKNDX  0.2  
TROIQ  <0.05 No results found for: CHOL, CHOLX, CHLST, CHOLV, HDL, LDL, LDLC, DLDLP, TGLX, TRIGL, TRIGP, CHHD, CHHDX No results found for: [facility] Lab Results Component Value Date/Time Color YELLOW/STRAW 09/03/2018 11:36 PM  
 Appearance CLEAR 09/03/2018 11:36 PM  
 Specific gravity 1.025 09/03/2018 11:36 PM  
 pH (UA) 6.0 09/03/2018 11:36 PM  
 Protein 30 (A) 09/03/2018 11:36 PM  
 Glucose NEGATIVE  09/03/2018 11:36 PM  
 Ketone TRACE (A) 09/03/2018 11:36 PM  
 Bilirubin NEGATIVE  09/03/2018 11:36 PM  
 Urobilinogen 2.0 (H) 09/03/2018 11:36 PM  
 Nitrites NEGATIVE  09/03/2018 11:36 PM  
 Leukocyte Esterase NEGATIVE  09/03/2018 11:36 PM  
 Epithelial cells FEW 09/03/2018 11:36 PM  
 Bacteria NEGATIVE  09/03/2018 11:36 PM  
 WBC 5-10 09/03/2018 11:36 PM  
 RBC 20-50 09/03/2018 11:36 PM  
 
 
 
Medications Reviewed:  
 
Current Facility-Administered Medications Medication Dose Route Frequency  QUEtiapine (SEROquel) tablet 25 mg  25 mg Oral QHS  sodium chloride (NS) flush 5-10 mL  5-10 mL IntraVENous Q8H  
 sodium chloride (NS) flush 5-10 mL  5-10 mL IntraVENous PRN  piperacillin-tazobactam (ZOSYN) 3.375 g in 0.9% sodium chloride (MBP/ADV) 100 mL  3.375 g IntraVENous Q8H  
 amLODIPine (NORVASC) tablet 5 mg  5 mg Oral DAILY  senna-docusate (PERICOLACE) 8.6-50 mg per tablet 1 Tab  1 Tab Oral DAILY  polyethylene glycol (MIRALAX) packet 17 g  17 g Oral DAILY  
 
______________________________________________________________________ EXPECTED LENGTH OF STAY: 2d 16h ACTUAL LENGTH OF STAY:          0 Quynh Pickard MD

## 2018-09-04 NOTE — PROGRESS NOTES
Problem: Falls - Risk of 
Goal: *Absence of Falls Document Zoraida Bhakta Fall Risk and appropriate interventions in the flowsheet. Fall Risk Interventions: 
Mobility Interventions: Communicate number of staff needed for ambulation/transfer Mentation Interventions: Adequate sleep, hydration, pain control Elimination Interventions: Call light in reach History of Falls Interventions: Bed/chair exit alarm

## 2018-09-04 NOTE — PROGRESS NOTES
Bedside shift change report given to Inocencio Aguilar RN (oncoming nurse) by Nilda Molina RN (offgoing nurse). Report included the following information SBAR.

## 2018-09-04 NOTE — PROGRESS NOTES
Bedside and Verbal shift change report given to Steve (oncoming nurse) by [employer] (offgoing nurse). Report included the following information SBAR, Kardex, Intake/Output and MAR.

## 2018-09-05 LAB
ANION GAP SERPL CALC-SCNC: 9 MMOL/L (ref 5–15)
BACTERIA SPEC CULT: ABNORMAL
BUN SERPL-MCNC: 25 MG/DL (ref 6–20)
BUN/CREAT SERPL: 32 (ref 12–20)
CALCIUM SERPL-MCNC: 8.5 MG/DL (ref 8.5–10.1)
CC UR VC: ABNORMAL
CHLORIDE SERPL-SCNC: 112 MMOL/L (ref 97–108)
CK SERPL-CCNC: 247 U/L (ref 26–192)
CO2 SERPL-SCNC: 24 MMOL/L (ref 21–32)
CREAT SERPL-MCNC: 0.78 MG/DL (ref 0.55–1.02)
GLUCOSE SERPL-MCNC: 107 MG/DL (ref 65–100)
POTASSIUM SERPL-SCNC: 4 MMOL/L (ref 3.5–5.1)
SERVICE CMNT-IMP: ABNORMAL
SODIUM SERPL-SCNC: 145 MMOL/L (ref 136–145)
URATE SERPL-MCNC: 1.9 MG/DL (ref 2.6–6)

## 2018-09-05 PROCEDURE — 80048 BASIC METABOLIC PNL TOTAL CA: CPT | Performed by: INTERNAL MEDICINE

## 2018-09-05 PROCEDURE — 74011250637 HC RX REV CODE- 250/637: Performed by: FAMILY MEDICINE

## 2018-09-05 PROCEDURE — 74011250636 HC RX REV CODE- 250/636: Performed by: FAMILY MEDICINE

## 2018-09-05 PROCEDURE — 82550 ASSAY OF CK (CPK): CPT | Performed by: INTERNAL MEDICINE

## 2018-09-05 PROCEDURE — 74011250637 HC RX REV CODE- 250/637: Performed by: INTERNAL MEDICINE

## 2018-09-05 PROCEDURE — 97116 GAIT TRAINING THERAPY: CPT | Performed by: PHYSICAL THERAPIST

## 2018-09-05 PROCEDURE — 97535 SELF CARE MNGMENT TRAINING: CPT

## 2018-09-05 PROCEDURE — 74011000258 HC RX REV CODE- 258: Performed by: FAMILY MEDICINE

## 2018-09-05 PROCEDURE — 97165 OT EVAL LOW COMPLEX 30 MIN: CPT

## 2018-09-05 PROCEDURE — 97530 THERAPEUTIC ACTIVITIES: CPT | Performed by: PHYSICAL THERAPIST

## 2018-09-05 PROCEDURE — 65270000032 HC RM SEMIPRIVATE

## 2018-09-05 PROCEDURE — 74011000258 HC RX REV CODE- 258: Performed by: INTERNAL MEDICINE

## 2018-09-05 PROCEDURE — 74011000250 HC RX REV CODE- 250: Performed by: INTERNAL MEDICINE

## 2018-09-05 PROCEDURE — 84550 ASSAY OF BLOOD/URIC ACID: CPT | Performed by: INTERNAL MEDICINE

## 2018-09-05 PROCEDURE — 36415 COLL VENOUS BLD VENIPUNCTURE: CPT | Performed by: INTERNAL MEDICINE

## 2018-09-05 PROCEDURE — 97161 PT EVAL LOW COMPLEX 20 MIN: CPT | Performed by: PHYSICAL THERAPIST

## 2018-09-05 RX ORDER — COLCHICINE 0.6 MG/1
0.6 TABLET ORAL DAILY
Status: DISCONTINUED | OUTPATIENT
Start: 2018-09-05 | End: 2018-09-08 | Stop reason: HOSPADM

## 2018-09-05 RX ADMIN — SODIUM CHLORIDE 75 ML/HR: 450 INJECTION, SOLUTION INTRAVENOUS at 17:19

## 2018-09-05 RX ADMIN — PIPERACILLIN SODIUM,TAZOBACTAM SODIUM 3.38 G: 3; .375 INJECTION, POWDER, FOR SOLUTION INTRAVENOUS at 19:30

## 2018-09-05 RX ADMIN — Medication 10 ML: at 22:02

## 2018-09-05 RX ADMIN — Medication 10 ML: at 05:27

## 2018-09-05 RX ADMIN — PIPERACILLIN SODIUM,TAZOBACTAM SODIUM 3.38 G: 3; .375 INJECTION, POWDER, FOR SOLUTION INTRAVENOUS at 11:46

## 2018-09-05 RX ADMIN — PIPERACILLIN SODIUM,TAZOBACTAM SODIUM 3.38 G: 3; .375 INJECTION, POWDER, FOR SOLUTION INTRAVENOUS at 05:26

## 2018-09-05 RX ADMIN — AMLODIPINE BESYLATE 5 MG: 5 TABLET ORAL at 09:19

## 2018-09-05 RX ADMIN — CASTOR OIL AND BALSAM, PERU: 788; 87 OINTMENT TOPICAL at 15:09

## 2018-09-05 RX ADMIN — COLCHICINE 0.6 MG: 0.6 TABLET, FILM COATED ORAL at 12:10

## 2018-09-05 RX ADMIN — POLYETHYLENE GLYCOL 3350 17 G: 17 POWDER, FOR SOLUTION ORAL at 09:19

## 2018-09-05 RX ADMIN — Medication 1 TABLET: at 09:19

## 2018-09-05 NOTE — PROGRESS NOTES
Bedside shift change report given to [address] [place] [address] RN (oncoming nurse) by Maria Victoria Reveles (offgoing nurse). Report included the following information SBAR, Kardex and MAR. It was dicussed with Fremeg Guardian before she left that the order for a tap water enema was not completed and I was unable to complete the order due to the patient being agitated and uncooperative. It took several nurses to get her labs drawn this morning due to her agitation. This was relayed to Manish May the oncoming nurse this morning.

## 2018-09-05 NOTE — PROGRESS NOTES
Problem: Pressure Injury - Risk of 
Goal: *Prevention of pressure injury Document Rivas Scale and appropriate interventions in the flowsheet. Outcome: Progressing Towards Goal 
Pressure Injury Interventions: 
Sensory Interventions: Assess changes in LOC Moisture Interventions: Absorbent underpads Activity Interventions: Increase time out of bed Mobility Interventions: HOB 30 degrees or less Nutrition Interventions: Document food/fluid/supplement intake Friction and Shear Interventions: Apply protective barrier, creams and emollients

## 2018-09-05 NOTE — WOUND CARE
Wound Care Note:  
 
New consult placed by nurse request for redness on sacrum Chart shows: 
Admitted for bilateral pneumonia Past Medical History:  
Diagnosis Date  Dementia  Hypertension WBC = 8.9 on 9/4/18 Assessment:  
Patient is alert with some talking, incontinent with some assistance needed in repositioning. Bed: Total Care Patient wearing briefs for incontinence Diet: Cardiac Regular Patient reports no pain Bilateral heels, buttocks, and sacral skin intact and with blanchable erythema. 1. POA sacral skin with blanchable erythema, there are three linear dark lines on the sacrum, salena-wound intact with top of buttocks being firm, no increase in temperature, wound edges are closed. 2.  POA right buttock with small crusted wound on the beefy portion of her buttock. 3.  POA left heel with dark spot approximately 0.4 cm x 0.4 cm x 0 cm, wound edges are closed, salena-wound blanchable erythema. Spoke with Dr. Karen Segura, wound care orders obtained. Patient repositioned on right side Heels offloaded on pillows. Recommendations:   
Sacrum, buttocks and bilateral heels- Every 8 hours liberally apply Venelex ointment Specialty bed: P-500 on a Versacare frame ordered via [employer]. Use only flat sheet and one incontinence pad. Please call Julia Aponte if not delivered. Confirmation #[id number] Skin Care & Pressure Prevention: 
Minimize layers of linen/pads under patient to optimize support surface. Turn/reposition approximately every 2 hours and offload heels. Manage incontinence / promote continence Discussed above plan with patient & Ramona Hearn RN Transition of Care: Plan to follow as needed while admitted to hospital. 
 
CASANDRA Bob, RN, [facility], [facility]. 
office [phone] 
pager [phone] or call  to page

## 2018-09-05 NOTE — PROGRESS NOTES
09/04/18 2022 Vital Signs Temp 98.8 °F (37.1 °C) Temp Source Oral  
Pulse (Heart Rate) 82 Heart Rate Source Monitor Resp Rate 18  
O2 Sat (%) 96 % Level of Consciousness Alert /80 MAP (Calculated) 114 BP 1 Method Automatic  
BP 1 Location Right arm BP Patient Position At rest  
MEWS Score 1 Oxygen Therapy O2 Device Room air  
left message with Hospitalists answering service for [address], NP, to call reference patient's blood pressure. Await return call. 2028 Informed Vesta RUIZ of patient's blood pressure. She stated she will put orders in now. Await order.

## 2018-09-05 NOTE — CONSULTS
PALLIATIVE MEDICINE        Consult noted and appreciated. We will be available to see the patient tomorrow 9/6/18. Kavin Godoy.  [address] MSN, FNP-BC, [facility]

## 2018-09-05 NOTE — PROGRESS NOTES
Hospitalist Progress Note Quynh Pickard MD 
Answering service: [phone] OR [phone] from in house phone Date of Service:  2018 NAME:  Mariusz Ivey :  1927 MRN:  [id number] Admission Summary:  
A 63-year-old -American female with past medical history of dementia, hypertension, general debility, presented to the emergency department via EMS with initial reported chief complaint of right hip pain. At current, the patient is mostly nonverbal and not answering any of my questions. As such, a majority of history has been obtained from review of ED medical reports. Per the collective reports, patient had initially complained of right hip pain radiating to her right knee. She reportedly normally uses a walker but has been unable to ambulate, yesterday requiring assistance. She reportedly had fallen approximately 2 days ago. It was uncertain if she actually hit her hip. She also reportedly had some complaints of abdominal pain, constipation, decreased oral intake. On arrival to the emergency department, initial recorded vital signs, blood pressure 155/95, heart rate of 94, respiratory rate of 18. Workup included CT abdomen and pelvis without contrast showed patchy bilateral pneumonia with trace right pleural effusion with mild colonic fecal burden. There was no acute process noted. Patient was started on levofloxacin 500 mg IV for antibiotic coverage. Interval history / Subjective:  
  Patient seen and examined; family members in the room. Patient sleepy but easily arousable, poor historian. No acute event overnight Assessment & Plan: #. Right leg pain/knee swelling, unclear cause, possible pseudogout. -LE doppler negative for DVT, but reported incidental finding of a popliteal fossa fluid collection (unclear significance) -Right knee x-ray mild to moderate osteoarthritis with chondrocalcinosis -Uric acid 1.9 
-Started on low dose colchicine 0.6mg daily #. Abnormal CXR and CT reported Patchy bilateral pneumonia 
-Patient without respiratory symptoms, no cough/SOB, fever/chills or leukocytosis. -Noted patient coughs during drinking/eating, high risk of aspiration, speech/swallow consult. 
-Currently on IV Zosyn, will continue for 1-2more days and switch to oral. 
 
#. Constipation: per pt's family members last BM few days ago. CT reported mild colonic fecal burden. Will give tap water enema now, and start Senna-Docusate and miralax. #. Recent fall; needs PT/OT #. Mild rhabdomyolysis (POA): could be related to recent fall and dehydration 
-Continue gentle IVF #. Dementia: supportive care. Palliative care consulted Code status: full DVT prophylaxis: SCD Care Plan discussed with: Patient/Family and Nurse Disposition: D Hospital Problems  Date Reviewed: 9/4/2018 Codes Class Noted POA Right hip pain ICD-10-CM: M25.551 ICD-9-CM: 719.45  9/4/2018 Unknown * (Principal)Bilateral pneumonia ICD-10-CM: J18.9 ICD-9-CM: 702  9/4/2018 Unknown Pleural effusion, right ICD-10-CM: J90 ICD-9-CM: 511.9  9/4/2018 Unknown Review of Systems:  
Review of systems not obtained due to patient factors. Vital Signs:  
 Last 24hrs VS reviewed since prior progress note. Most recent are: 
Visit Vitals  /70 (BP 1 Location: Right arm, BP Patient Position: At rest)  Pulse 78  Temp 99.1 °F (37.3 °C)  Resp 18  Ht 5' (1.524 m)  Wt 56.1 kg (123 lb 10.9 oz)  SpO2 98%  BMI 24.15 kg/m2 Intake/Output Summary (Last 24 hours) at 09/05/18 1740 Last data filed at 09/05/18 1016 Gross per 24 hour Intake             1605 ml Output                0 ml Net             1605 ml Physical Examination:  
 
 
     
Constitutional:  sleepy but easily arousable, in NAD  
ENT:  Oral mucous moist, oropharynx benign.  Neck supple,   
 Resp: CTA bilaterally. No wheezing/rhonchi/rales. No accessory muscle use CV:  Regular rhythm, normal rate, no murmurs, gallops, rubs GI:  Soft, non distended, non tender. normoactive bowel sounds, no hepatosplenomegaly Musculoskeletal: Right knee area swelling, tender and limited range of motion/passively Neurologic:  Moves all extremities. AAOx0 Data Review:  
 Review and/or order of clinical lab test 
Review and/or order of tests in the radiology section of [facility] Labs:  
 
Recent Labs  
   09/04/18 0127 09/03/18 2025 WBC  8.9  9.9 HGB  10.9*  12.0 HCT  34.9*  38.7 PLT  305  332 Recent Labs  
   09/05/18 0426 09/04/18 0127 09/03/18 2025 NA  145  145  145  
K  4.0  3.9  3.9 CL  112*  111*  107 CO2  24  26  26 BUN  25*  29*  34* CREA  0.78  0.67  0.92  
GLU  107*  107*  104* CA  8.5  8.0*  9.1 URICA  1.9*   --    --   
 
Recent Labs  
   09/03/18 2025 SGOT  62* ALT  46 AP  93 TBILI  0.5 TP  8.3* ALB  2.9*  
GLOB  5.4* No results for input(s): INR, PTP, APTT in the last 72 hours. No lab exists for component: INREXT, INREXT No results for input(s): FE, TIBC, PSAT, FERR in the last 72 hours. No results found for: FOL, RBCF No results for input(s): PH, PCO2, PO2 in the last 72 hours. Recent Labs  
   09/05/18 0426 09/03/18 2025 CPK  247*  824* CKNDX   --   0.2  
TROIQ   --   <0.05 No results found for: CHOL, CHOLX, CHLST, CHOLV, HDL, LDL, LDLC, DLDLP, TGLX, TRIGL, TRIGP, CHHD, CHHDX No results found for: [facility] Lab Results Component Value Date/Time  Color YELLOW/STRAW 09/03/2018 11:36 PM  
 Appearance CLEAR 09/03/2018 11:36 PM  
 Specific gravity 1.025 09/03/2018 11:36 PM  
 pH (UA) 6.0 09/03/2018 11:36 PM  
 Protein 30 (A) 09/03/2018 11:36 PM  
 Glucose NEGATIVE  09/03/2018 11:36 PM  
 Ketone TRACE (A) 09/03/2018 11:36 PM  
 Bilirubin NEGATIVE  09/03/2018 11:36 PM  
 Urobilinogen 2.0 (H) 09/03/2018 11:36 PM  
 Nitrites NEGATIVE  09/03/2018 11:36 PM  
 Leukocyte Esterase NEGATIVE  09/03/2018 11:36 PM  
 Epithelial cells FEW 09/03/2018 11:36 PM  
 Bacteria NEGATIVE  09/03/2018 11:36 PM  
 WBC 5-10 09/03/2018 11:36 PM  
 RBC 20-50 09/03/2018 11:36 PM  
 
 
 
Medications Reviewed:  
 
Current Facility-Administered Medications Medication Dose Route Frequency  colchicine tablet 0.6 mg  0.6 mg Oral DAILY  balsam peru-castor oil (VENELEX) [id number] mg/gram ointment   Topical Q8H  
 QUEtiapine (SEROquel) tablet 25 mg  25 mg Oral QHS  sodium chloride (NS) flush 5-10 mL  5-10 mL IntraVENous Q8H  
 sodium chloride (NS) flush 5-10 mL  5-10 mL IntraVENous PRN  piperacillin-tazobactam (ZOSYN) 3.375 g in 0.9% sodium chloride (MBP/ADV) 100 mL  3.375 g IntraVENous Q8H  
 amLODIPine (NORVASC) tablet 5 mg  5 mg Oral DAILY  senna-docusate (PERICOLACE) 8.6-50 mg per tablet 1 Tab  1 Tab Oral DAILY  polyethylene glycol (MIRALAX) packet 17 g  17 g Oral DAILY  0.45% sodium chloride infusion  75 mL/hr IntraVENous CONTINUOUS  
 hydrALAZINE (APRESOLINE) 20 mg/mL injection 10 mg  10 mg IntraVENous Q6H PRN  
 
______________________________________________________________________ EXPECTED LENGTH OF STAY: 3d 9h 
ACTUAL LENGTH OF STAY:          1 Dimitrios Cook MD

## 2018-09-05 NOTE — PROGRESS NOTES
Bedside and Verbal shift change report given to Yariel Fabian (oncoming nurse) by Fabian Ferrer (offgoing nurse). Report included the following information SBAR, MAR and Recent Results.

## 2018-09-05 NOTE — PROGRESS NOTES
Problem: Mobility Impaired (Adult and Pediatric) Goal: *Acute Goals and Plan of Care (Insert Text) Physical Therapy Goals 9/5/2018 1. Patient will move from supine to sit and sit to supine , scoot up and down and roll side to side in bed with minimal assistance/contact guard assist within 7 day(s). 2.  Patient will transfer from bed to chair and chair to bed with minimal assistance/contact guard assist using the least restrictive device within 7 day(s). 3.  Patient will perform sit to stand with minimal assistance/contact guard assist within 7 day(s). 4.  Patient will ambulate with minimal assistance/contact guard assist for 5 feet with the least restrictive device within 7 day(s). physical Therapy EVALUATION Patient: Moe Townsend (65 y.o. female) Date: 9/5/2018 Primary Diagnosis: Bilateral pneumonia Right hip pain Pleural effusion, right Precautions:   Fall ASSESSMENT : 
Based on the objective data described below, the patient presents with impaired mobility and gait following a fall at home a few days ago. So far she is negative for a fx. Her RLE is edematous and she has decreased flexion at the hip and knee due to pain. She came to sitting EOB with max assist of 2 and initially was very rigid. I let her pull herself up by holding my hands and that seemed to get her into sitting better than her pushing up. She sat EOB for over 10 minutes and the longer she sat the more relaxed she got. Her  on the bed rails relaxed as well until she could hold herself upright without holding on. She then stood with a RW with max assist of 2 and essentially was lifted into standing. She has decreased WB RLE and did not want to put weight through the RLE. She stood to be cleaned up and then we just pivoted her around on the LLE and brought the chair up behind.   Once sitting she again relaxed a bit but tended to keep the right hip extended not wanting to flex it to get her hip back in the chair. Nursing is present and will get her back to bed within the hour. She was fairly animated once up and visiting with multiple family members. Needs SNF rehab. She is cooperative and has supportive family. Patient will benefit from skilled intervention to address the above impairments. Patients rehabilitation potential is considered to be Good Factors which may influence rehabilitation potential include:  
[]         None noted 
[]         Mental ability/status [x]         Medical condition 
[]         Home/family situation and support systems 
[]         Safety awareness 
[]         Pain tolerance/management 
[]         Other: PLAN : 
Recommendations and Planned Interventions: 
[x]           Bed Mobility Training             []    Neuromuscular Re-Education 
[x]           Transfer Training                   []    Orthotic/Prosthetic Training 
[x]           Gait Training                         []    Modalities [x]           Therapeutic Exercises           []    Edema Management/Control 
[]           Therapeutic Activities            [x]    Patient and Family Training/Education 
[]           Other (comment): Frequency/Duration: Patient will be followed by physical therapy  5 times a week to address goals. Discharge Recommendations: Jeremiah Hathaway Further Equipment Recommendations for Discharge: none SUBJECTIVE:  
Patient stated It hurts.  OBJECTIVE DATA SUMMARY:  
HISTORY:   
Past Medical History:  
Diagnosis Date  Dementia  Hypertension History reviewed. No pertinent surgical history. Prior Level of Function/Home Situation: Lives with her brother and has multiple family members present that assist in her care. She was using a RW and took sponge baths. Daughters indicate she isn't going to return to her situation. Personal factors and/or comorbidities impacting plan of care: none Home Situation Home Environment: Private residence # Steps to Enter: 2 Rails to Enter: Yes Hand Rails : Bilateral 
Wheelchair Ramp: No 
One/Two Story Residence: One story Living Alone: No 
Support Systems: Family member(s) Patient Expects to be Discharged to[de-identified] Skilled nursing facility Current DME Used/Available at Home: Chantale Rincon, rollator, Commode, bedside Tub or Shower Type: Other (comment) (sponge bathing) EXAMINATION/PRESENTATION/DECISION MAKING:  
Critical Behavior: 
Neurologic State: Alert, Confused Orientation Level: Oriented to person, Disoriented to place, Disoriented to situation, Disoriented to time Cognition: Decreased command following, Decreased attention/concentration, Impaired decision making, Poor safety awareness Safety/Judgement: Decreased awareness of environment, Decreased awareness of need for assistance, Decreased awareness of need for safety, Decreased insight into deficits, Fall prevention Hearing: Auditory Auditory Impairment: None Skin:  Per nursing. Edema: per nursing. Range Of Motion: 
AROM: Generally decreased, functional 
  
  
  
  
  
  
  
Strength:   
Strength: Generally decreased, functional 
  
  
  
  
  
  
Tone & Sensation:  
Tone: Normal 
  
  
  
  
Sensation: Intact Coordination: 
Coordination: Generally decreased, functional 
Vision:  
Tracking: Able to track stimulus in all quadrants w/o difficulty Diplopia: No 
Acuity: Impaired near vision; Impaired far vision Functional Mobility: 
Bed Mobility: 
  
Supine to Sit: Moderate assistance; Additional time;Assist x2 Scooting: Maximum assistance; Additional time;Assist x2 Transfers: 
Sit to Stand: Maximum assistance; Additional time;Assist x2 Stand to Sit: Maximum assistance; Additional time;Assist x2 Bed to Chair: Maximum assistance; Additional time;Assist x2 Balance:  
Sitting: Impaired Sitting - Static: Good (unsupported) Sitting - Dynamic: Fair (occasional) Standing: Impaired; With support Standing - Static: Fair;Constant support Standing - Dynamic : Poor Ambulation/Gait Training: 
Distance (ft): 1 Feet (ft) Assistive Device: Walker, rolling;Gait belt Gait Description (WDL): Exceptions to [facility] Gait Abnormalities: Antalgic Base of Support: Shift to left Stance: Left increased She stood with max assist of 2 with the RW and decreased WB on the RLE and we pivoted her on the LLE and brought the chair up behind her. Functional Measure: 
Barthel Index: 
 
Bathin Bladder: 5 Bowels: 5 Groomin Dressin Feedin Mobility: 0 Stairs: 0 Toilet Use: 0 Transfer (Bed to Chair and Back): 5 Total: 20 Barthel and G-code impairment scale: 
Percentage of impairment CH 
0% CI 
1-19% CJ 
20-39% CK 
40-59% CL 
60-79% CM 
80-99% CN 
100% Barthel Score 0-100 100 99-80 79-60 59-40 20-39 1-19 
 0 Barthel Score 0-20 20 17-19 13-16 9-12 5-8 1-4 0 The Barthel ADL Index: Guidelines 1. The index should be used as a record of what a patient does, not as a record of what a patient could do. 2. The main aim is to establish degree of independence from any help, physical or verbal, however minor and for whatever reason. 3. The need for supervision renders the patient not independent. 4. A patient's performance should be established using the best available evidence. Asking the patient, friends/relatives and nurses are the usual sources, but direct observation and common sense are also important. However direct testing is not needed. 5. Usually the patient's performance over the preceding 24-48 hours is important, but occasionally longer periods will be relevant. 6. Middle categories imply that the patient supplies over 50 per cent of the effort. 7. Use of aids to be independent is allowed. Lorenzo Mckeon, Barthel, D.W. ([id number]). Functional evaluation: the Barthel Index. [facility] (14)2. TREVA Thomas, Sudeep Ceja.Torsten., Anna, [address] (1999). Measuring the change indisability after inpatient rehabilitation; comparison of the responsiveness of the Barthel Index and Functional Mitchell Measure. Journal of Neurology, Neurosurgery, and Psychiatry, 66(4), 349-800. MARCELA Grigsby, SUNNY Thomas, & Michael Olivares M.A. (2004.) Assessment of post-stroke quality of life in cost-effectiveness studies: The usefulness of the Barthel Index and the EuroQoL-5D. [facility], 13, 832-59 G codes: In compliance with CMSs Claims Based Outcome Reporting, the following G-code set was chosen for this patient based on their primary functional limitation being treated: The outcome measure chosen to determine the severity of the functional limitation was the Barthel with a score of 20/100 which was correlated with the impairment scale. ? Mobility - Walking and Moving Around:  
  - CURRENT STATUS: CL - 60%-79% impaired, limited or restricted  - GOAL STATUS: CK - 40%-59% impaired, limited or restricted  - D/C STATUS:  ---------------To be determined---------------  
  
Pain: 
  
  
  
  
  
  
Activity Tolerance:  
Good. Please refer to the flowsheet for vital signs taken during this treatment. After treatment:  
[x]         Patient left in no apparent distress sitting up in chair 
[]         Patient left in no apparent distress in bed 
[x]         Call bell left within reach [x]         Nursing notified 
[x]         Caregiver present [x]         Bed alarm activated COMMUNICATION/EDUCATION:  
The patients plan of care was discussed with: Occupational Therapist and Registered Nurse. [x]         Fall prevention education was provided and the patient/caregiver indicated understanding. [x]         Patient/family have participated as able in goal setting and plan of care. [x]         Patient/family agree to work toward stated goals and plan of care. []         Patient understands intent and goals of therapy, but is neutral about his/her participation. []         Patient is unable to participate in goal setting and plan of care. Thank you for this referral. 
Erik Braun, PT Time Calculation: 50 mins

## 2018-09-05 NOTE — PROGRESS NOTES
Problem: Falls - Risk of 
Goal: *Absence of Falls Document Aubree Tadeo Fall Risk and appropriate interventions in the flowsheet. Outcome: Progressing Towards Goal 
Fall Risk Interventions: 
Mobility Interventions: Assess mobility with egress test 
 
Mentation Interventions: Adequate sleep, hydration, pain control Elimination Interventions: Call light in reach History of Falls Interventions: Bed/chair exit alarm, Door open when patient unattended

## 2018-09-05 NOTE — PROGRESS NOTES
Spiritual Care Assessment/Progress Note [address] 
 
 
NAME: Mariusz Ivey      MRN: [id number] AGE: 80 y.o. SEX: female Restorationist Affiliation: Vudu  
Language: [place] 9/5/2018     Total Time (in minutes): 10 Spiritual Assessment begun in [facility] through conversation with: 
  
    [x]Patient        [x] Family    [] Friend(s) Reason for Consult: Palliative Care, Initial/Spiritual Assessment Spiritual beliefs: (Please include comment if needed) [x] Identifies with a sebastien tradition: Jehovah's witness    
   [] Supported by a sebastien community:        
   [] Claims no spiritual orientation:       
   [] Seeking spiritual identity:            
   [] Adheres to an individual form of spirituality:       
   [] Not able to assess:                   
 
    
Identified resources for coping:  
   [x] Prayer                           
   [] Music                  [] Guided Imagery [x] Family/friends                 [] Pet visits [] Devotional reading                         [] Unknown 
   [] Other Interventions offered during this visit: (See comments for more details) Patient Interventions: Prayer (actual), Prayer (assurance of), Initial/Spiritual assessment, patient floor Family/Friend(s): Prayer (actual), Prayer (assurance of), Initial Assessment Plan of Care: 
 
 [] Support spiritual and/or cultural needs  
 [] Support AMD and/or advance care planning process    
 [] Support grieving process 
 [] Coordinate Rites and/or Rituals  
 [] Coordination with community clergy [] No spiritual needs identified at this time 
 [] Detailed Plan of Care below (See Comments)  [] Make referral to Music Therapy 
[] Make referral to Pet Therapy    
[] Make referral to Addiction services 
[] Make referral to [facility] 
[] Make referral to Spiritual Care Partner 
[] No future visits requested [x] Follow up visits as needed Comments: Visited Ms. Srinivasan Branch and family for initial spiritual assessment. Pt's family was engaged in conversation at doorway, but invited  go in and offer prayer with Ms. Srinivasan Branch. Pt was sitting in the chair with her niece at bedside. Interaction with pt was limited, but she gladly accepted my offer of prayer. No additional needs expressed by pt or family at this time. Assured them of prayers and of  availability. Barbara Ellis, Palliative

## 2018-09-05 NOTE — PROGRESS NOTES
Problem: Self Care Deficits Care Plan (Adult) Goal: *Acute Goals and Plan of Care (Insert Text) Occupational Therapy Goals Initiated 9/5/2018 1. Patient will perform self-feeding in supported sitting with set-up within 7 day(s). 2.  Patient will perform grooming sitting on EOB with minimal assistance within 7 day(s). 3.  Patient will perform upper body ADLs with minimal assistance within 7 day(s). 4.  Patient will perform toilet transfers to [facility] with moderate assistance within 7 day(s). 5.  Patient will perform all aspects of toileting with minimal assistance within 7 day(s). 6.  Patient will participate in upper extremity therapeutic exercise/activities with minimal assistance/contact guard assist for 5 minutes within 7 day(s). 7.  Patient will utilize energy conservation techniques during functional activities with verbal and tactile cues within 7 day(s). Occupational Therapy EVALUATION Patient: Governor Massey (82 y.o. female) Date: 9/5/2018 Primary Diagnosis: Bilateral pneumonia Right hip pain Pleural effusion, right Precautions:  Fall ASSESSMENT : 
Based on the objective data described below, the patient presents with overall max-total A x2 for bed mobility and functional mobility, min-max A for upper body ADLs and max-total A for lower body ADLs s/p admission for bilateral pneumonia and R hip pain. Patient with GLF 2-3 days ago. Today, patient ADLs limited by impaired balance, fear of falling (noted significant retropulsion at beginning of session), impaired strength/generalized weakness, decreased functional activity tolerance, limited ROM/functional reach, cognition ([place] vs. Cognition - impaired safety awareness, sequencing, command follow, attention to task and complex processing). Patient would benefit from SNF level rehab once medically stable in order to address the above deficits and maximize functional independence. Recommend with nursing patient to complete as able in order to maintain strength, endurance and independence: ADLs with supervision/setup, OOB to chair 1x/day, bed to chair position 2x/day and mobilizing self in bed in prep for toileting with 2 assist. Thank you for your assistance. Patient will benefit from skilled intervention to address the above impairments. Patients rehabilitation potential is considered to be Fair Factors which may influence rehabilitation potential include:  
[]             None noted []             Mental ability/status []             Medical condition []             Home/family situation and support systems []             Safety awareness []             Pain tolerance/management 
[]             Other: PLAN : 
Recommendations and Planned Interventions: 
[x]               Self Care Training                  [x]        Therapeutic Activities [x]               Functional Mobility Training    []        Cognitive Retraining 
[x]               Therapeutic Exercises           [x]        Endurance Activities [x]               Balance Training                   []        Neuromuscular Re-Education []               Visual/Perceptual Training     [x]   Home Safety Training 
[x]               Patient Education                 [x]        Family Training/Education []               Other (comment): Frequency/Duration: Patient will be followed by occupational therapy 5 times a week to address goals. Discharge Recommendations: [facility] Further Equipment Recommendations for Discharge: TBD by rehab SUBJECTIVE:  
Patient stated Len Christian you baby.  OBJECTIVE DATA SUMMARY:  
HISTORY:  
Past Medical History:  
Diagnosis Date  Dementia  Hypertension History reviewed. No pertinent surgical history. Prior Level of Function/Environment/Context: Patient lives in 1 level home with family.  Family reporting patient has had steady decline in ability to complete self care in last few months. Patient requires assist with dressing, bathing and toileting. Patient's family reported patient having more incontinence issues lately and increased difficulty with functional mobility. Patient sponge bathes at baseline. Extensive family support Home Situation Home Environment: Private residence # Steps to Enter: 2 Rails to Enter: Yes Hand Rails : Bilateral 
Wheelchair Ramp: No 
One/Two Story Residence: One story Living Alone: No 
Support Systems: Family member(s) Patient Expects to be Discharged to[de-identified] Skilled nursing facility Current DME Used/Available at Home: Rayetta Danker, rollator, Commode, bedside Tub or Shower Type: Other (comment) (sponge bathing) Hand dominance: Right EXAMINATION OF PERFORMANCE DEFICITS: 
Cognitive/Behavioral Status: 
Neurologic State: Alert;Confused Orientation Level: Oriented to person;Disoriented to place; Disoriented to situation;Disoriented to time Cognition: Decreased command following;Decreased attention/concentration; Impaired decision making;Poor safety awareness Perception: Cues to maintain midline in sitting Perseveration: No perseveration noted Safety/Judgement: Decreased awareness of environment;Decreased awareness of need for assistance;Decreased awareness of need for safety;Decreased insight into deficits; Fall prevention Skin: Appears grossly intact Edema: significant swelling in RLE, none noted in BUEs Hearing: Auditory Auditory Impairment: None Vision/Perceptual:   
Tracking: Able to track stimulus in all quadrants w/o difficulty Diplopia: No   
Acuity: Impaired near vision; Impaired far vision Range of Motion: [place] [address] Grossly tested AROM: Generally decreased, functional 
 
Strength: In BUEs 
3+/5 during testing Grossly observed during session (5/5) Strength: Generally decreased, functional 
 
Coordination: 
Coordination: Generally decreased, functional 
 Fine Motor Skills-Upper: Left Intact; Right Intact Gross Motor Skills-Upper: Left Intact; Right Intact Tone & Sensation: [place] [address] Tone: Normal 
Sensation: Intact Balance: 
Sitting: Impaired Sitting - Static: Good (unsupported) Sitting - Dynamic: Fair (occasional) Standing: Impaired; With support Standing - Static: Fair;Constant support Standing - Dynamic : Poor Functional Mobility and Transfers for ADLs: 
Bed Mobility: 
Supine to Sit: Moderate assistance; Additional time;Assist x2 Scooting: Maximum assistance; Additional time;Assist x2 Transfers: 
Sit to Stand: Maximum assistance; Additional time;Assist x2 Stand to Sit: Maximum assistance; Additional time;Assist x2 Bed to Chair: Maximum assistance; Additional time;Assist x2 Toilet Transfer : Maximum assistance; Additional time;Assist x2 (Infer per obs of functional mobility, strength, balance) ADL Assessment: 
Feeding: Minimum assistance* Oral Facial Hygiene/Grooming: Moderate assistance* Bathing: Maximum assistance* Upper Body Dressing: Moderate assistance* Lower Body Dressing: Total assistance Toileting: Total assistance *Infer per obs of functional mobility, functional reach, BUE ROM, strength, balance and cognition ADL Intervention and task modifications: 
 
Lower Body Dressing Assistance Socks: Total assistance (dependent) Leg Crossed Method Used: No 
Cues: Don;Physical assistance Toileting Toileting Assistance: Total assistance(dependent) Bladder Hygiene: Total assistance (dependent) Bowel Hygiene: Total assistance (dependent) Clothing Management: Total assistance (dependent) Cues: Physical assistance for pants down;Physical assistance for pants up; Tactile cues provided;Verbal cues provided Cognitive Retraining Orientation Retraining: Reorienting;Place;Situation;Time Attention to Task: Distractibility; Single task Maintains Attention For (Time): 2 minutes Following Commands: Follows one step commands/directions Safety/Judgement: Decreased awareness of environment;Decreased awareness of need for assistance;Decreased awareness of need for safety;Decreased insight into deficits; Fall prevention Cues: Tactile cues provided;Verbal cues provided Functional Measure: 
Barthel Index: 
 
Bathin Bladder: 5 Bowels: 5 Groomin Dressin Feedin Mobility: 0 Stairs: 0 Toilet Use: 0 Transfer (Bed to Chair and Back): 5 Total: 20 Barthel and G-code impairment scale: 
Percentage of impairment CH 
0% CI 
1-19% CJ 
20-39% CK 
40-59% CL 
60-79% CM 
80-99% CN 
100% Barthel Score 0-100 100 99-80 79-60 59-40 20-39 1-19 
 0 Barthel Score 0-20 20 17-19 13-16 9-12 5-8 1-4 0 The Barthel ADL Index: Guidelines 1. The index should be used as a record of what a patient does, not as a record of what a patient could do. 2. The main aim is to establish degree of independence from any help, physical or verbal, however minor and for whatever reason. 3. The need for supervision renders the patient not independent. 4. A patient's performance should be established using the best available evidence. Asking the patient, friends/relatives and nurses are the usual sources, but direct observation and common sense are also important. However direct testing is not needed. 5. Usually the patient's performance over the preceding 24-48 hours is important, but occasionally longer periods will be relevant. 6. Middle categories imply that the patient supplies over 50 per cent of the effort. 7. Use of aids to be independent is allowed. Liliana Staples., Barthel, D.W. ([id number]). Functional evaluation: the Barthel Index. [facility] (14)2. Bhavana Snyder montse TREVA Young, Audrey Pimentel., Yakov Garrison, Anna, [address] ().  Measuring the change indisability after inpatient rehabilitation; comparison of the responsiveness of the Barthel Index and Functional [place] Measure. Journal of Neurology, Neurosurgery, and Psychiatry, 66(4), 144-398. MARCELA Martinez, SUNNY Thomas, & Mary Rodas M.A. (2004.) Assessment of post-stroke quality of life in cost-effectiveness studies: The usefulness of the Barthel Index and the EuroQoL-5D. [facility], 13, 635-07 G codes: In compliance with CMSs Claims Based Outcome Reporting, the following G-code set was chosen for this patient based on their primary functional limitation being treated: The outcome measure chosen to determine the severity of the functional limitation was the Barthel Index with a score of 20/100 which was correlated with the impairment scale. ? Self Care:  
  - CURRENT STATUS: CL - 60%-79% impaired, limited or restricted  - GOAL STATUS: CK - 40%-59% impaired, limited or restricted  - D/C STATUS:  ---------------To be determined--------------- Occupational Therapy Evaluation Charge Determination History Examination Decision-Making LOW Complexity : Brief history review  MEDIUM Complexity : 3-5 performance deficits relating to physical, cognitive , or psychosocial skils that result in activity limitations and / or participation restrictions HIGH Complexity : Patient presents with comorbidities that affect occupational performance. Signifigant modification of tasks or assistance (eg, physical or verbal) with assessment (s) is necessary to enable patient to complete evaluation Based on the above components, the patient evaluation is determined to be of the following complexity level: LOW Activity Tolerance:  
Fair, VSS Please refer to the flowsheet for vital signs taken during this treatment. After treatment:  
[x] Patient left in no apparent distress sitting up in chair 
[] Patient left in no apparent distress in bed 
[x] Call bell left within reach [x] Nursing notified 
[x] Family present [x] Chair alarm activated COMMUNICATION/EDUCATION:  
The patients plan of care was discussed with: Physical Therapist and Registered Nurse. [x] Home safety education was provided and the patient/caregiver indicated understanding. [x] Patient/family have participated as able in goal setting and plan of care. [] Patient/family agree to work toward stated goals and plan of care. [] Patient understands intent and goals of therapy, but is neutral about his/her participation. [] Patient is unable to participate in goal setting and plan of care. This patients plan of care is appropriate for delegation to hospitals. Thank you for this referral. 
Jose Ryder, OT Time Calculation: 39 mins

## 2018-09-06 PROCEDURE — 76450000000

## 2018-09-06 PROCEDURE — 74011250636 HC RX REV CODE- 250/636: Performed by: FAMILY MEDICINE

## 2018-09-06 PROCEDURE — 65270000032 HC RM SEMIPRIVATE

## 2018-09-06 PROCEDURE — 74011250637 HC RX REV CODE- 250/637: Performed by: HOSPITALIST

## 2018-09-06 PROCEDURE — 74011000258 HC RX REV CODE- 258: Performed by: FAMILY MEDICINE

## 2018-09-06 PROCEDURE — 74011250636 HC RX REV CODE- 250/636: Performed by: NURSE PRACTITIONER

## 2018-09-06 PROCEDURE — 74011000258 HC RX REV CODE- 258: Performed by: INTERNAL MEDICINE

## 2018-09-06 PROCEDURE — 74011250637 HC RX REV CODE- 250/637: Performed by: FAMILY MEDICINE

## 2018-09-06 PROCEDURE — 74011000250 HC RX REV CODE- 250: Performed by: HOSPITALIST

## 2018-09-06 PROCEDURE — 97116 GAIT TRAINING THERAPY: CPT

## 2018-09-06 PROCEDURE — 97530 THERAPEUTIC ACTIVITIES: CPT

## 2018-09-06 PROCEDURE — 74011250637 HC RX REV CODE- 250/637: Performed by: INTERNAL MEDICINE

## 2018-09-06 PROCEDURE — 74011000250 HC RX REV CODE- 250: Performed by: INTERNAL MEDICINE

## 2018-09-06 RX ORDER — LIDOCAINE 50 MG/G
1 PATCH TOPICAL EVERY 24 HOURS
Status: DISCONTINUED | OUTPATIENT
Start: 2018-09-06 | End: 2018-09-08 | Stop reason: HOSPADM

## 2018-09-06 RX ORDER — ACETAMINOPHEN 325 MG/1
650 TABLET ORAL EVERY 6 HOURS
Status: DISCONTINUED | OUTPATIENT
Start: 2018-09-06 | End: 2018-09-08 | Stop reason: HOSPADM

## 2018-09-06 RX ADMIN — Medication 10 ML: at 14:37

## 2018-09-06 RX ADMIN — POLYETHYLENE GLYCOL 3350 17 G: 17 POWDER, FOR SOLUTION ORAL at 09:16

## 2018-09-06 RX ADMIN — PIPERACILLIN SODIUM,TAZOBACTAM SODIUM 3.38 G: 3; .375 INJECTION, POWDER, FOR SOLUTION INTRAVENOUS at 20:20

## 2018-09-06 RX ADMIN — PIPERACILLIN SODIUM,TAZOBACTAM SODIUM 3.38 G: 3; .375 INJECTION, POWDER, FOR SOLUTION INTRAVENOUS at 12:25

## 2018-09-06 RX ADMIN — ACETAMINOPHEN 650 MG: 325 TABLET ORAL at 20:20

## 2018-09-06 RX ADMIN — QUETIAPINE FUMARATE 25 MG: 25 TABLET ORAL at 23:00

## 2018-09-06 RX ADMIN — PIPERACILLIN SODIUM,TAZOBACTAM SODIUM 3.38 G: 3; .375 INJECTION, POWDER, FOR SOLUTION INTRAVENOUS at 04:36

## 2018-09-06 RX ADMIN — CASTOR OIL AND BALSAM, PERU: 788; 87 OINTMENT TOPICAL at 07:11

## 2018-09-06 RX ADMIN — SODIUM CHLORIDE 75 ML/HR: 450 INJECTION, SOLUTION INTRAVENOUS at 16:13

## 2018-09-06 RX ADMIN — Medication 1 TABLET: at 09:17

## 2018-09-06 RX ADMIN — HYDRALAZINE HYDROCHLORIDE 10 MG: 20 INJECTION INTRAMUSCULAR; INTRAVENOUS at 09:13

## 2018-09-06 RX ADMIN — AMLODIPINE BESYLATE 5 MG: 5 TABLET ORAL at 09:16

## 2018-09-06 RX ADMIN — CASTOR OIL AND BALSAM, PERU: 788; 87 OINTMENT TOPICAL at 14:00

## 2018-09-06 RX ADMIN — COLCHICINE 0.6 MG: 0.6 TABLET, FILM COATED ORAL at 09:17

## 2018-09-06 NOTE — PROGRESS NOTES
CM met with daughters (Leonel Robles) and son Citlali Ovalles) to introduce self and role. The patient was admitted form home with complaints of hip pain. Her son is the primary caregiver and Mary and Dot assist as needed. The patient lives in a 1 story residence. She has a walker and shower chair. Melissa Mclean assists patient with transfers from bed to chair. Melissa Mclean states he would like to have home health (if indicated) for PT/OT and a home health aide to assist with bathing. The patient wears incontinent briefs at home. The patient is recently known to [facility] (Dr. Antione Cheung). The most recent appt with Dr. Antione Cheung was less than 1 month in the past. 
 
CM noted recommendations from PT and OT for snf rehab. CM spoke with all family members regarding this  Recommendation and they are in agreement. CM provided the family with listing of area snfs. Family was familiar with [facility] and [facility]. Referrals sent to these facilities. CM will continue to follow.   Campbell Coffman, FAHAD, CRM

## 2018-09-06 NOTE — PROGRESS NOTES
Bedside and Verbal shift change report given to Payam Mcfarland (oncoming nurse) by Wilver Smith (offgoing nurse). Report included the following information SBAR, Kardex, MAR and Recent Results.

## 2018-09-06 NOTE — PROGRESS NOTES
Problem: Mobility Impaired (Adult and Pediatric) Goal: *Acute Goals and Plan of Care (Insert Text) Physical Therapy Goals 9/5/2018 1. Patient will move from supine to sit and sit to supine , scoot up and down and roll side to side in bed with minimal assistance/contact guard assist within 7 day(s). 2.  Patient will transfer from bed to chair and chair to bed with minimal assistance/contact guard assist using the least restrictive device within 7 day(s). 3.  Patient will perform sit to stand with minimal assistance/contact guard assist within 7 day(s). 4.  Patient will ambulate with minimal assistance/contact guard assist for 5 feet with the least restrictive device within 7 day(s). physical Therapy TREATMENT Patient: Sanna Blanco (89 y.o. female) Date: 9/6/2018 Diagnosis: Bilateral pneumonia Right hip pain Pleural effusion, right Bilateral pneumonia Precautions: Fall Chart, physical therapy assessment, plan of care and goals were reviewed. ASSESSMENT: 
Patient received in bed awake, confused. Patient presenting with stiffness in bilateral LE's and worked on heel slides. Right knee edematous and apparently painful as patient yells out when moving. Moving better today with supine to sit. Initial sitting balance is poor with heavy posterior lean but able to get to midline and hold. Sit to stand a little easier today but still with posterior lean. Able to ambulate with RW with mod to max assist with decreased right side weight bearing and leaning left. Left up in chair with all needs met. Continue to recommend SNF for rehab as she is below baseline for mobility. Progression toward goals: 
[]    Improving appropriately and progressing toward goals [x]    Improving slowly and progressing toward goals 
[]    Not making progress toward goals and plan of care will be adjusted PLAN: 
Patient continues to benefit from skilled intervention to address the above impairments. Continue treatment per established plan of care. Discharge Recommendations:  [facility] Further Equipment Recommendations for Discharge:  none SUBJECTIVE:  
Patient stated Lets go.  \"I'm tired of this. \" OBJECTIVE DATA SUMMARY:  
Critical Behavior: 
Neurologic State: Confused Orientation Level: Disoriented to place, Disoriented to situation, Disoriented to time Cognition: Decreased command following, Decreased attention/concentration Safety/Judgement: Decreased awareness of environment, Decreased awareness of need for assistance, Decreased awareness of need for safety, Decreased insight into deficits, Fall prevention Functional Mobility Training: 
Bed Mobility: 
  
Supine to Sit: Moderate assistance;Assist x2 Scooting: Maximum assistance Transfers: 
Sit to Stand: Assist x2; Moderate assistance Stand to Sit: Maximum assistance Balance: 
Sitting: Impaired; Without support Sitting - Static: Fair (occasional) Sitting - Dynamic: Poor (constant support) Standing: Impaired; With support Standing - Static: Poor Standing - Dynamic : Poor Ambulation/Gait Training: 
Distance (ft): 8 Feet (ft) Assistive Device: Gait belt;Walker, rolling Gait Abnormalities: Antalgic;Decreased step clearance; Path deviations; Shuffling gait Base of Support: Shift to left Stance: Left increased Therapeutic Exercises:  
Heel slides and ankle pumps -assisted Pain: 
Pain Scale 1: Numeric (0 - 10) Pain Intensity 1: 0 After treatment:  
[x]    Patient left in no apparent distress sitting up in chair 
[]    Patient left in no apparent distress in bed 
[x]    Call bell left within reach [x]    Nursing notified 
[]    Caregiver present [x]    Bed alarm activated COMMUNICATION/COLLABORATION:  
The patients plan of care was discussed with: Registered Nurse and  Brooklynn Rubio, PT 
 Time Calculation: 18 mins

## 2018-09-06 NOTE — CONSULTS
Palliative Medicine Consult  Schulte: [phone] ([phone])    Patient Name: Sanna Blanco  YOB: 1927    Date of Initial Consult: September 6, 2018  Reason for Consult: Care Decisions   Requesting Provider: Dr. Indy Wolfe  Primary Care Physician: Meghann Do MD     SUMMARY:   Sanna Blanco is a 80 y.o. with a past history of Dementia, HTN, general debility, who was admitted on 9/3/2018 from home with a diagnosis of bilateral pneumonia, rt hip pain, rt pleural effusion, constipation. Lower ext edema, abdominal pain, recent fall,. Imaging did not reveal any acute process of abdomen or hip. No recent admissions noted. Pt seen in the ED 5 months ago for a left hip contusion. Other issues: rt leg pain/knee swelling    Current medical issues leading to Palliative Medicine involvement include: support with care decisions given advanced age    Social: lives at home with family, 3 children, son is the primary caregiver     PALLIATIVE DIAGNOSES:   1. Shortness of breath~ resolved  2. Bilateral leg edema  3. Acute Abdominal pain 2/2 constipation~ improved  4. Hypoalbuminemia   5. Physical Debility     PLAN:   1. Pt seen without family present  2. Pt unable to provide any reliable information due to the dementia  3. Called daughter~ Jamarcus Reyna and left a message requesting a call back to discuss our services  4. Will follow up if the pt remains admitted. 5. Initial consult note routed to primary continuity provider  6.  Communicated plan of care with: Palliative IDT       GOALS OF CARE / TREATMENT PREFERENCES:     GOALS OF CARE:  Patient/Health Care Proxy Stated Goals: Rehabilitation      TREATMENT PREFERENCES:   Code Status: Full Code    Advance Care Planning:  Advance Care Planning 9/6/2018   Patient's Healthcare Decision Maker is: Unable to obtain   Primary Decision Maker Name Jamarcus Reyna  no documented mpoa with multiple children   Primary Decision Maker Phone Number [phone]   Primary Decision Maker Relationship to Patient Adult child   Confirm Advance Directive None       Medical Interventions: Full interventions   Other Instructions: Other:    As far as possible, the palliative care team has discussed with patient / health care proxy about goals of care / treatment preferences for patient. HISTORY:     History obtained from:  chart    CHIEF COMPLAINT: pt admitted from home with pneumonia and pleural effusion    HPI/SUBJECTIVE:    The patient is:   [x] Verbal and participatory but limited due to dementia  [] Non-participatory due to: No complaints of shortness of breath or pain.   Says she has had a bowel movement     Clinical Pain Assessment (nonverbal scale for severity on nonverbal patients):   Clinical Pain Assessment  Severity: 0          Duration: for how long has pt been experiencing pain (e.g., 2 days, 1 month, years)  Frequency: how often pain is an issue (e.g., several times per day, once every few days, constant)     FUNCTIONAL ASSESSMENT:     Palliative Performance Scale (PPS):  PPS: 40       PSYCHOSOCIAL/SPIRITUAL SCREENING:     Palliative IDT has assessed this patient for cultural preferences / practices and a referral made as appropriate to needs (Cultural Services, Patient Advocacy, Ethics, etc.)    Advance Care Planning:  Advance Care Planning 9/6/2018   Patient's Healthcare Decision Maker is: Unable to obtain   Primary Decision Maker Name Baron Coppola  no documented mpoa with multiple children   Primary Decision Maker Phone Number [phone]   Primary Decision Maker Relationship to Patient Adult child   Confirm Advance Directive None       Any spiritual / Episcopal concerns:  [] Yes /  [x] No    Caregiver Burnout:  [] Yes /  [] No /  [x] No Caregiver Present      Anticipatory grief assessment:   [x] Normal  / [] Maladaptive       ESAS Anxiety: Anxiety: 0    ESAS Depression: Depression: 0        REVIEW OF SYSTEMS:     Positive and pertinent negative findings in ROS are noted above in HPI. The following systems were [x] reviewed / [] unable to be reviewed as noted in HPI  Other findings are noted below. Systems: constitutional, ears/nose/mouth/throat, respiratory, gastrointestinal, genitourinary, musculoskeletal, integumentary, neurologic, psychiatric, endocrine. Positive findings noted below. Modified ESAS Completed by: provider   Fatigue: 0 Drowsiness: 0   Depression: 0 Pain: 0   Anxiety: 0 Nausea: 0     Dyspnea: 0     Constipation: Yes     Stool Occurrence(s): 1        PHYSICAL EXAM:     From RN flowsheet:  Wt Readings from Last 3 Encounters:   09/04/18 123 lb 10.9 oz (56.1 kg)   03/29/18 122 lb 12.8 oz (55.7 kg)   01/01/18 107 lb (48.5 kg)     Blood pressure 178/65, pulse 74, temperature 99 °F (37.2 °C), resp. rate 18, height 5' (1.524 m), weight 123 lb 10.9 oz (56.1 kg), SpO2 96 %. Pain Scale 1: Numeric (0 - 10)  Pain Intensity 1: 0                 Last bowel movement, if known:     Constitutional: alert, pleasantly demented  Eyes: pupils equal, anicteric  ENMT: no nasal discharge, moist mucous membranes  Cardiovascular: regular rhythm, distal pulses intact, bilat leg edema  Respiratory: breathing not labored, symmetric  Gastrointestinal: soft non-tender, +bowel sounds  Musculoskeletal: no deformity, no tenderness to palpation  Skin: warm, dry  Neurologic: following minimal commands  Psychiatric: neg agitation  Other:       HISTORY:     Principal Problem:    Bilateral pneumonia (9/4/2018)    Active Problems:    Right hip pain (9/4/2018)      Pleural effusion, right (9/4/2018)      Past Medical History:   Diagnosis Date    Dementia     Hypertension       History reviewed. No pertinent surgical history. History reviewed. No pertinent family history. History reviewed, no pertinent family history.   Social History   Substance Use Topics    Smoking status: Never Smoker    Smokeless tobacco: Never Used    Alcohol use No     No Known Allergies   Current Facility-Administered Medications   Medication Dose Route Frequency    colchicine tablet 0.6 mg  0.6 mg Oral DAILY    balsam peru-castor oil (VENELEX)  mg/gram ointment   Topical Q8H    QUEtiapine (SEROquel) tablet 25 mg  25 mg Oral QHS    sodium chloride (NS) flush 5-10 mL  5-10 mL IntraVENous Q8H    sodium chloride (NS) flush 5-10 mL  5-10 mL IntraVENous PRN    piperacillin-tazobactam (ZOSYN) 3.375 g in 0.9% sodium chloride (MBP/ADV) 100 mL  3.375 g IntraVENous Q8H    amLODIPine (NORVASC) tablet 5 mg  5 mg Oral DAILY    senna-docusate (PERICOLACE) 8.6-50 mg per tablet 1 Tab  1 Tab Oral DAILY    polyethylene glycol (MIRALAX) packet 17 g  17 g Oral DAILY    0.45% sodium chloride infusion  75 mL/hr IntraVENous CONTINUOUS    hydrALAZINE (APRESOLINE) 20 mg/mL injection 10 mg  10 mg IntraVENous Q6H PRN          LAB AND IMAGING FINDINGS:     Lab Results   Component Value Date/Time    WBC 8.9 09/04/2018 01:27 AM    HGB 10.9 (L) 09/04/2018 01:27 AM    PLATELET 232 89/51/8238 01:27 AM     Lab Results   Component Value Date/Time    Sodium 145 09/05/2018 04:26 AM    Potassium 4.0 09/05/2018 04:26 AM    Chloride 112 (H) 09/05/2018 04:26 AM    CO2 24 09/05/2018 04:26 AM    BUN 25 (H) 09/05/2018 04:26 AM    Creatinine 0.78 09/05/2018 04:26 AM    Calcium 8.5 09/05/2018 04:26 AM      Lab Results   Component Value Date/Time    AST (SGOT) 62 (H) 09/03/2018 08:25 PM    Alk.  phosphatase 93 09/03/2018 08:25 PM    Protein, total 8.3 (H) 09/03/2018 08:25 PM    Albumin 2.9 (L) 09/03/2018 08:25 PM    Globulin 5.4 (H) 09/03/2018 08:25 PM     No results found for: INR, PTMR, PTP, PT1, PT2, APTT   No results found for: IRON, FE, TIBC, IBCT, PSAT, FERR   No results found for: PH, PCO2, PO2  No components found for: [place]   Lab Results   Component Value Date/Time     (H) 09/05/2018 04:26 AM    CK - MB 1.9 09/03/2018 08:25 PM                Total time: 50 minutes  Counseling / coordination time, spent as noted above:  40 minutes  > 50% counseling / coordination?: y    Prolonged service was provided for  []30 min   []75 min in face to face time in the presence of the patient, spent as noted above. Time Start:   Time End:   Note: this can only be billed with 35969 (initial) or 12888 (follow up). If multiple start / stop times, list each separately.

## 2018-09-06 NOTE — PROGRESS NOTES
..Bedside shift change report given to [place] Suarez and Lennie Funes RN (oncoming nurse) by Nita Laguerre RN (offgoing nurse). Report included the following information SBAR, Kardex and MAR.

## 2018-09-06 NOTE — PROGRESS NOTES
Problem: Falls - Risk of 
Goal: *Absence of Falls Document Emmanuel Miller Fall Risk and appropriate interventions in the flowsheet. Outcome: Progressing Towards Goal 
Fall Risk Interventions: 
Mobility Interventions: Assess mobility with egress test 
 
Mentation Interventions: Adequate sleep, hydration, pain control Elimination Interventions: Bed/chair exit alarm History of Falls Interventions: Bed/chair exit alarm Problem: Pressure Injury - Risk of 
Goal: *Prevention of pressure injury Document Rivas Scale and appropriate interventions in the flowsheet. Offload heels Turn approximately every 2 hours Upgrade bed Outcome: Progressing Towards Goal 
Pressure Injury Interventions: 
Sensory Interventions: Assess changes in LOC Moisture Interventions: Absorbent underpads Activity Interventions: Increase time out of bed Mobility Interventions: HOB 30 degrees or less Nutrition Interventions: Document food/fluid/supplement intake Friction and Shear Interventions: Apply protective barrier, creams and emollients

## 2018-09-06 NOTE — PROGRESS NOTES
Physical Therapy Returned to room with chair alarm sounding. Patient sliding forward in chair, family present. Attempted to tranfer back to bed via stand pivot. Stood with mod assist but became afraid and yelling and sat back in chair. Transport team member arrived to assist and with max assist x 2 transferred back to bed. Attempting to educate on safety and hand placement but with dementia and fear, unable to manage well. Continue to recommend SNF for rehab.  
Monique Lael, PT

## 2018-09-06 NOTE — PROGRESS NOTES
Hospitalist Progress Note Cora Littlejohn MD 
Answering service: [phone] OR [phone] from in house phone Date of Service:  2018 NAME:  Mariusz Ivey :  1927 MRN:  [id number] Admission Summary:  
A 57-year-old -American female with past medical history of dementia, hypertension, general debility, presented to the emergency department via EMS with initial reported chief complaint of right hip pain. At current, the patient is mostly nonverbal and not answering any of my questions. As such, a majority of history has been obtained from review of ED medical reports. Per the collective reports, patient had initially complained of right hip pain radiating to her right knee. She reportedly normally uses a walker but has been unable to ambulate, yesterday requiring assistance. She reportedly had fallen approximately 2 days ago. It was uncertain if she actually hit her hip. She also reportedly had some complaints of abdominal pain, constipation, decreased oral intake. On arrival to the emergency department, initial recorded vital signs, blood pressure 155/95, heart rate of 94, respiratory rate of 18. Workup included CT abdomen and pelvis without contrast showed patchy bilateral pneumonia with trace right pleural effusion with mild colonic fecal burden. There was no acute process noted. Patient was started on levofloxacin 500 mg IV for antibiotic coverage. Interval history / Subjective:  
  Patient seen and examined; family members in the room. Patient sleepy but easily arousable, poor historian. No acute event overnight Assessment & Plan: #. Right leg pain/knee swelling, unclear cause, possible pseudogout. -LE doppler negative for DVT, but reported incidental finding of a popliteal fossa fluid collection (unclear significance) -Right knee x-ray mild to moderate osteoarthritis with chondrocalcinosis -Uric acid 1.9 
-Started on low dose colchicine 0.6mg daily 
-topical lidoderm patch, tylenol. PT/OT #. Abnormal CXR and CT reported Patchy bilateral pneumonia 
-Patient without respiratory symptoms, no cough/SOB, fever/chills or leukocytosis. -Noted patient coughs during drinking/eating, high risk of aspiration, speech/swallow consult. 
-Currently on IV Zosyn, change to po augmentin in am.  
 
#. Constipation: per pt's family members last BM few days ago. CT reported mild colonic fecal burden. Will give tap water enema now, and start Senna-Docusate and miralax. Increasing miralaz #. Recent fall; needs PT/OT #. Mild rhabdomyolysis (POA): could be related to recent fall and dehydration 
-Continue gentle IVF #. Dementia: supportive care. Palliative care consulted Code status: full DVT prophylaxis: SCD Care Plan discussed with: Patient/Family and Nurse Disposition: SNF in am if bed available. Discussed with 3 daughters bedside. Hospital Problems  Date Reviewed: 9/4/2018 Codes Class Noted POA Right hip pain ICD-10-CM: M25.551 ICD-9-CM: 719.45  9/4/2018 Unknown * (Principal)Bilateral pneumonia ICD-10-CM: J18.9 ICD-9-CM: 695  9/4/2018 Unknown Pleural effusion, right ICD-10-CM: J90 ICD-9-CM: 511.9  9/4/2018 Unknown Review of Systems:  
Review of systems not obtained due to patient factors. Vital Signs:  
 Last 24hrs VS reviewed since prior progress note. Most recent are: 
Visit Vitals  /65 (BP 1 Location: Right arm, BP Patient Position: At rest)  Pulse 74  Temp 99 °F (37.2 °C)  Resp 18  Ht 5' (1.524 m)  Wt 56.1 kg (123 lb 10.9 oz)  SpO2 96%  BMI 24.15 kg/m2 No intake or output data in the 24 hours ending 09/06/18 1400 Physical Examination:  
 
 
     
Constitutional: Awake, cooperative, NAD   
ENT:  Oral mucous moist, oropharynx benign.  Neck supple,   
 Resp: CTA bilaterally. No wheezing/rhonchi/rales. No accessory muscle use CV:  Regular rhythm, normal rate, no murmurs, gallops, rubs GI:  Soft, non distended, non tender. normoactive bowel sounds, no hepatosplenomegaly Musculoskeletal: Right knee area swelling, tender and limited range of motion/passively Neurologic:  Moves all extremities. AAOx0 Data Review:  
 Review and/or order of clinical lab test 
Review and/or order of tests in the radiology section of [facility] Labs:  
 
Recent Labs  
   09/04/18 0127 09/03/18 2025 WBC  8.9  9.9 HGB  10.9*  12.0 HCT  34.9*  38.7 PLT  305  332 Recent Labs  
   09/05/18 0426 09/04/18 0127 09/03/18 2025 NA  145  145  145  
K  4.0  3.9  3.9 CL  112*  111*  107 CO2  24  26  26 BUN  25*  29*  34* CREA  0.78  0.67  0.92  
GLU  107*  107*  104* CA  8.5  8.0*  9.1 URICA  1.9*   --    --   
 
Recent Labs  
   09/03/18 2025 SGOT  62* ALT  46 AP  93 TBILI  0.5 TP  8.3* ALB  2.9*  
GLOB  5.4* No results for input(s): INR, PTP, APTT in the last 72 hours. No lab exists for component: INREXT, INREXT No results for input(s): FE, TIBC, PSAT, FERR in the last 72 hours. No results found for: FOL, RBCF No results for input(s): PH, PCO2, PO2 in the last 72 hours. Recent Labs  
   09/05/18 0426 09/03/18 2025 CPK  247*  824* CKNDX   --   0.2  
TROIQ   --   <0.05 No results found for: CHOL, CHOLX, CHLST, CHOLV, HDL, LDL, LDLC, DLDLP, TGLX, TRIGL, TRIGP, CHHD, CHHDX No results found for: Shell [place] Lab Results Component Value Date/Time  Color YELLOW/STRAW 09/03/2018 11:36 PM  
 Appearance CLEAR 09/03/2018 11:36 PM  
 Specific gravity 1.025 09/03/2018 11:36 PM  
 pH (UA) 6.0 09/03/2018 11:36 PM  
 Protein 30 (A) 09/03/2018 11:36 PM  
 Glucose NEGATIVE  09/03/2018 11:36 PM  
 Ketone TRACE (A) 09/03/2018 11:36 PM  
 Bilirubin NEGATIVE  09/03/2018 11:36 PM  
 Urobilinogen 2.0 (H) 09/03/2018 11:36 PM  
 Nitrites NEGATIVE  09/03/2018 11:36 PM  
 Leukocyte Esterase NEGATIVE  09/03/2018 11:36 PM  
 Epithelial cells FEW 09/03/2018 11:36 PM  
 Bacteria NEGATIVE  09/03/2018 11:36 PM  
 WBC 5-10 09/03/2018 11:36 PM  
 RBC 20-50 09/03/2018 11:36 PM  
 
 
 
Medications Reviewed:  
 
Current Facility-Administered Medications Medication Dose Route Frequency  colchicine tablet 0.6 mg  0.6 mg Oral DAILY  balsam peru-castor oil (VENELEX) [id number] mg/gram ointment   Topical Q8H  
 QUEtiapine (SEROquel) tablet 25 mg  25 mg Oral QHS  sodium chloride (NS) flush 5-10 mL  5-10 mL IntraVENous Q8H  
 sodium chloride (NS) flush 5-10 mL  5-10 mL IntraVENous PRN  piperacillin-tazobactam (ZOSYN) 3.375 g in 0.9% sodium chloride (MBP/ADV) 100 mL  3.375 g IntraVENous Q8H  
 amLODIPine (NORVASC) tablet 5 mg  5 mg Oral DAILY  senna-docusate (PERICOLACE) 8.6-50 mg per tablet 1 Tab  1 Tab Oral DAILY  polyethylene glycol (MIRALAX) packet 17 g  17 g Oral DAILY  0.45% sodium chloride infusion  75 mL/hr IntraVENous CONTINUOUS  
 hydrALAZINE (APRESOLINE) 20 mg/mL injection 10 mg  10 mg IntraVENous Q6H PRN  
 
______________________________________________________________________ EXPECTED LENGTH OF STAY: 3d 9h 
ACTUAL LENGTH OF STAY:          2 Giuseppe Del Rio MD

## 2018-09-06 NOTE — PROGRESS NOTES
CM sent referrals to the following facilities per family's request : [facility], [facility], [facility] and [facility]. [place] and [facility] are non-par with patient's insurance. [facility] does not have bed availability. [facility] has approved patient for admission. CM accepted the bed on patient/family's behalf. Facility to submit for insurance authorization. D/C Plan. Awaiting insurance auth [facility]) for admission to [facility].   Abram Ellison, STUARTW, CRM

## 2018-09-06 NOTE — PROGRESS NOTES
Bedside shift change report given to Antonieta Perea RN (oncoming nurse) by Erich Naylor RN (offgoing nurse). Report included the following information SBAR, Kardex and Recent Results.

## 2018-09-07 LAB
ALBUMIN SERPL-MCNC: 3.1 G/DL (ref 3.5–5)
ALBUMIN/GLOB SERPL: 0.6 {RATIO} (ref 1.1–2.2)
ALP SERPL-CCNC: 93 U/L (ref 45–117)
ALT SERPL-CCNC: 38 U/L (ref 12–78)
ANION GAP SERPL CALC-SCNC: 11 MMOL/L (ref 5–15)
AST SERPL-CCNC: 32 U/L (ref 15–37)
BASOPHILS # BLD: 0.1 K/UL (ref 0–0.1)
BASOPHILS NFR BLD: 1 % (ref 0–1)
BILIRUB SERPL-MCNC: 0.6 MG/DL (ref 0.2–1)
BUN SERPL-MCNC: 10 MG/DL (ref 6–20)
BUN/CREAT SERPL: 15 (ref 12–20)
CALCIUM SERPL-MCNC: 8.9 MG/DL (ref 8.5–10.1)
CHLORIDE SERPL-SCNC: 109 MMOL/L (ref 97–108)
CO2 SERPL-SCNC: 24 MMOL/L (ref 21–32)
CREAT SERPL-MCNC: 0.65 MG/DL (ref 0.55–1.02)
DIFFERENTIAL METHOD BLD: ABNORMAL
EOSINOPHIL # BLD: 0.2 K/UL (ref 0–0.4)
EOSINOPHIL NFR BLD: 4 % (ref 0–7)
ERYTHROCYTE [DISTWIDTH] IN BLOOD BY AUTOMATED COUNT: 12.3 % (ref 11.5–14.5)
GLOBULIN SER CALC-MCNC: 4.9 G/DL (ref 2–4)
GLUCOSE SERPL-MCNC: 111 MG/DL (ref 65–100)
HCT VFR BLD AUTO: 39.5 % (ref 35–47)
HGB BLD-MCNC: 12.3 G/DL (ref 11.5–16)
IMM GRANULOCYTES # BLD: 0 K/UL (ref 0–0.04)
IMM GRANULOCYTES NFR BLD AUTO: 1 % (ref 0–0.5)
LYMPHOCYTES # BLD: 1.1 K/UL (ref 0.8–3.5)
LYMPHOCYTES NFR BLD: 17 % (ref 12–49)
MAGNESIUM SERPL-MCNC: 2 MG/DL (ref 1.6–2.4)
MCH RBC QN AUTO: 27.8 PG (ref 26–34)
MCHC RBC AUTO-ENTMCNC: 31.1 G/DL (ref 30–36.5)
MCV RBC AUTO: 89.4 FL (ref 80–99)
MONOCYTES # BLD: 0.6 K/UL (ref 0–1)
MONOCYTES NFR BLD: 9 % (ref 5–13)
NEUTS SEG # BLD: 4.6 K/UL (ref 1.8–8)
NEUTS SEG NFR BLD: 69 % (ref 32–75)
NRBC # BLD: 0 K/UL (ref 0–0.01)
NRBC BLD-RTO: 0 PER 100 WBC
PLATELET # BLD AUTO: 442 K/UL (ref 150–400)
PMV BLD AUTO: 10.2 FL (ref 8.9–12.9)
POTASSIUM SERPL-SCNC: 3.2 MMOL/L (ref 3.5–5.1)
PROT SERPL-MCNC: 8 G/DL (ref 6.4–8.2)
RBC # BLD AUTO: 4.42 M/UL (ref 3.8–5.2)
SODIUM SERPL-SCNC: 144 MMOL/L (ref 136–145)
WBC # BLD AUTO: 6.6 K/UL (ref 3.6–11)

## 2018-09-07 PROCEDURE — 65270000032 HC RM SEMIPRIVATE

## 2018-09-07 PROCEDURE — 36415 COLL VENOUS BLD VENIPUNCTURE: CPT | Performed by: HOSPITALIST

## 2018-09-07 PROCEDURE — 74011000250 HC RX REV CODE- 250: Performed by: INTERNAL MEDICINE

## 2018-09-07 PROCEDURE — 74011000250 HC RX REV CODE- 250: Performed by: HOSPITALIST

## 2018-09-07 PROCEDURE — 74011250637 HC RX REV CODE- 250/637: Performed by: HOSPITALIST

## 2018-09-07 PROCEDURE — 80053 COMPREHEN METABOLIC PANEL: CPT | Performed by: HOSPITALIST

## 2018-09-07 PROCEDURE — 74011250637 HC RX REV CODE- 250/637: Performed by: FAMILY MEDICINE

## 2018-09-07 PROCEDURE — 74011250636 HC RX REV CODE- 250/636: Performed by: FAMILY MEDICINE

## 2018-09-07 PROCEDURE — 83735 ASSAY OF MAGNESIUM: CPT | Performed by: HOSPITALIST

## 2018-09-07 PROCEDURE — 74011000258 HC RX REV CODE- 258: Performed by: FAMILY MEDICINE

## 2018-09-07 PROCEDURE — 85025 COMPLETE CBC W/AUTO DIFF WBC: CPT | Performed by: HOSPITALIST

## 2018-09-07 PROCEDURE — 74011250636 HC RX REV CODE- 250/636: Performed by: NURSE PRACTITIONER

## 2018-09-07 PROCEDURE — 74011250637 HC RX REV CODE- 250/637: Performed by: INTERNAL MEDICINE

## 2018-09-07 RX ORDER — AMLODIPINE BESYLATE 5 MG/1
5 TABLET ORAL ONCE
Status: COMPLETED | OUTPATIENT
Start: 2018-09-07 | End: 2018-09-07

## 2018-09-07 RX ORDER — AMOXICILLIN AND CLAVULANATE POTASSIUM 500; 125 MG/1; MG/1
1 TABLET, FILM COATED ORAL EVERY 8 HOURS
Status: DISCONTINUED | OUTPATIENT
Start: 2018-09-07 | End: 2018-09-08 | Stop reason: HOSPADM

## 2018-09-07 RX ORDER — POTASSIUM CHLORIDE 750 MG/1
40 TABLET, FILM COATED, EXTENDED RELEASE ORAL
Status: COMPLETED | OUTPATIENT
Start: 2018-09-07 | End: 2018-09-07

## 2018-09-07 RX ORDER — AMLODIPINE BESYLATE 5 MG/1
10 TABLET ORAL DAILY
Status: DISCONTINUED | OUTPATIENT
Start: 2018-09-08 | End: 2018-09-08 | Stop reason: HOSPADM

## 2018-09-07 RX ADMIN — HYDRALAZINE HYDROCHLORIDE 10 MG: 20 INJECTION INTRAMUSCULAR; INTRAVENOUS at 21:02

## 2018-09-07 RX ADMIN — Medication 10 ML: at 21:02

## 2018-09-07 RX ADMIN — CASTOR OIL AND BALSAM, PERU: 788; 87 OINTMENT TOPICAL at 05:34

## 2018-09-07 RX ADMIN — POTASSIUM CHLORIDE 40 MEQ: 750 TABLET, EXTENDED RELEASE ORAL at 12:35

## 2018-09-07 RX ADMIN — PIPERACILLIN SODIUM,TAZOBACTAM SODIUM 3.38 G: 3; .375 INJECTION, POWDER, FOR SOLUTION INTRAVENOUS at 05:33

## 2018-09-07 RX ADMIN — PIPERACILLIN SODIUM,TAZOBACTAM SODIUM 3.38 G: 3; .375 INJECTION, POWDER, FOR SOLUTION INTRAVENOUS at 12:01

## 2018-09-07 RX ADMIN — ACETAMINOPHEN 650 MG: 325 TABLET ORAL at 12:01

## 2018-09-07 RX ADMIN — Medication 1 TABLET: at 09:38

## 2018-09-07 RX ADMIN — COLCHICINE 0.6 MG: 0.6 TABLET, FILM COATED ORAL at 09:38

## 2018-09-07 RX ADMIN — QUETIAPINE FUMARATE 25 MG: 25 TABLET ORAL at 21:00

## 2018-09-07 RX ADMIN — ACETAMINOPHEN 650 MG: 325 TABLET ORAL at 02:52

## 2018-09-07 RX ADMIN — CASTOR OIL AND BALSAM, PERU: 788; 87 OINTMENT TOPICAL at 02:53

## 2018-09-07 RX ADMIN — AMLODIPINE BESYLATE 5 MG: 5 TABLET ORAL at 09:38

## 2018-09-07 RX ADMIN — ACETAMINOPHEN 650 MG: 325 TABLET ORAL at 23:38

## 2018-09-07 RX ADMIN — AMOXICILLIN AND CLAVULANATE POTASSIUM 1 TABLET: 500; 125 TABLET, FILM COATED ORAL at 21:00

## 2018-09-07 RX ADMIN — HYDRALAZINE HYDROCHLORIDE 10 MG: 20 INJECTION INTRAMUSCULAR; INTRAVENOUS at 09:10

## 2018-09-07 RX ADMIN — AMOXICILLIN AND CLAVULANATE POTASSIUM 1 TABLET: 500; 125 TABLET, FILM COATED ORAL at 14:45

## 2018-09-07 RX ADMIN — ACETAMINOPHEN 650 MG: 325 TABLET ORAL at 17:31

## 2018-09-07 RX ADMIN — CASTOR OIL AND BALSAM, PERU: 788; 87 OINTMENT TOPICAL at 21:01

## 2018-09-07 RX ADMIN — Medication 10 ML: at 14:44

## 2018-09-07 RX ADMIN — AMLODIPINE BESYLATE 5 MG: 5 TABLET ORAL at 12:35

## 2018-09-07 RX ADMIN — CASTOR OIL AND BALSAM, PERU: 788; 87 OINTMENT TOPICAL at 17:14

## 2018-09-07 RX ADMIN — POLYETHYLENE GLYCOL 3350 17 G: 17 POWDER, FOR SOLUTION ORAL at 09:00

## 2018-09-07 NOTE — PROGRESS NOTES
Bedside shift change report given to Naty Kevin RN (oncoming nurse) by Anjali Emery (offgoing nurse). Report included the following information SBAR, Kardex and Recent Results.

## 2018-09-07 NOTE — PROGRESS NOTES
Follow-up call placed to [facility] and Rehab. Message left on admissions office voicemail that family accepts bed and requesting continuing with insurance authorization. FAHAD Farmer, CRM 
 
12:05p  CM met with patient's daughter (Nesha), granddaughter and the patient's [facility] (IZDINHNYMICHAEL FSCHJA-[phone], [phone]). Anibal Joseph spoke with [facility] at [facility] and facility was submitting request for auth via [facility]. CM will arrange transport upon insurance authorization.   FAHAD Farmer, CRM

## 2018-09-07 NOTE — PROGRESS NOTES
Hospitalist Progress Note Charity Anderson MD 
Answering service: [phone] from in house phone Date of Service:  2018 NAME:  Yakov Lay :  1927 MRN:  [id number] Admission Summary:  
A 40-year-old -American female with past medical history of dementia, hypertension, general debility, presented to the emergency department via EMS with initial reported chief complaint of right hip pain. At current, the patient is mostly nonverbal and not answering any of my questions. As such, a majority of history has been obtained from review of ED medical reports. Per the collective reports, patient had initially complained of right hip pain radiating to her right knee. She reportedly normally uses a walker but has been unable to ambulate, yesterday requiring assistance. She reportedly had fallen approximately 2 days ago. It was uncertain if she actually hit her hip. She also reportedly had some complaints of abdominal pain, constipation, decreased oral intake. On arrival to the emergency department, initial recorded vital signs, blood pressure 155/95, heart rate of 94, respiratory rate of 18. Workup included CT abdomen and pelvis without contrast showed patchy bilateral pneumonia with trace right pleural effusion with mild colonic fecal burden. There was no acute process noted. Patient was started on levofloxacin 500 mg IV for antibiotic coverage. Interval history / Subjective:  
 awake, but demented, talking but confused. poor historian. Assessment & Plan: #. Right leg pain/knee swelling, unclear cause, possible pseudogout. -LE doppler negative for DVT, but reported incidental finding of a popliteal fossa fluid collection (unclear significance) -Right knee x-ray mild to moderate osteoarthritis with chondrocalcinosis 
-Uric acid 1.9 
-Started on low dose colchicine 0.6mg daily -topical lidoderm patch, tylenol. PT/OT #. Abnormal CXR and CT reported Patchy bilateral pneumonia 
-Patient without respiratory symptoms, no cough/SOB, fever/chills or leukocytosis. -Noted patient coughs during drinking/eating, high risk of aspiration, speech/swallow consult. 
-on IV Zosyn, change to po augmentin 9/7. #. Constipation: per pt's family members last BM few days ago. CT reported mild colonic fecal burden. S/p tap water enema now,  
and start Senna-Docusate and miralax. Increasing miralaz  
-resolved. #. Recent fall; needs PT/OT #. Mild rhabdomyolysis (POA): could be related to recent fall and dehydration 
-Continue gentle IVF #. Dementia: supportive care. Continue Seroquel Palliative care consulted HTN: BP bryce, increase norvasc to 10mg daily Code status: full DVT prophylaxis: SCD Care Plan discussed with: Patient/Family and Nurse Disposition: SNF, await authorization . Discussed with 3 daughters bedside. Hospital Problems  Date Reviewed: 9/4/2018 Codes Class Noted POA Right hip pain ICD-10-CM: M25.551 ICD-9-CM: 719.45  9/4/2018 Unknown * (Principal)Bilateral pneumonia ICD-10-CM: J18.9 ICD-9-CM: 684  9/4/2018 Unknown Pleural effusion, right ICD-10-CM: J90 ICD-9-CM: 511.9  9/4/2018 Unknown Review of Systems:  
Review of systems not obtained due to patient factors. Vital Signs:  
 Last 24hrs VS reviewed since prior progress note. Most recent are: 
Visit Vitals  BP (!) 210/86 (BP 1 Location: Left arm, BP Patient Position: At rest)  Pulse 88  Temp 98.6 °F (37 °C)  Resp 17  Ht 5' (1.524 m)  Wt 56.1 kg (123 lb 10.9 oz)  SpO2 98%  BMI 24.15 kg/m2 No intake or output data in the 24 hours ending 09/07/18 1219 Physical Examination:  
 
 
     
Constitutional: Awake, cooperative, NAD   
ENT:  Oral mucous moist, oropharynx benign.  Neck supple,   
 Resp: CTA bilaterally. No wheezing/rhonchi/rales. No accessory muscle use CV:  Regular rhythm, normal rate, no murmurs, gallops, rubs GI:  Soft, non distended, non tender. normoactive bowel sounds, no hepatosplenomegaly Musculoskeletal: Right knee area swelling, tender and limited range of motion/passively Neurologic:  Moves all extremities. AAOx0 Data Review:  
 Review and/or order of clinical lab test 
Review and/or order of tests in the radiology section of [facility] Labs:  
 
Recent Labs  
   09/07/18 
 0900 WBC  6.6 HGB  12.3 HCT  39.5 PLT  442* Recent Labs  
   09/07/18 
 0900  09/05/18 
 0426 NA  144  145  
K  3.2*  4.0  
CL  109*  112* CO2  24  24 BUN  10  25* CREA  0.65  0.78 GLU  111*  107* CA  8.9  8.5 MG  2.0   --   
URICA   --   1.9* Recent Labs  
   09/07/18 
 0900 SGOT  32 ALT  38 AP  93 TBILI  0.6 TP  8.0 ALB  3.1*  
GLOB  4.9* No results for input(s): INR, PTP, APTT in the last 72 hours. No lab exists for component: INREXT, INREXT No results for input(s): FE, TIBC, PSAT, FERR in the last 72 hours. No results found for: FOL, RBCF No results for input(s): PH, PCO2, PO2 in the last 72 hours. Recent Labs  
   09/05/18 
 6554 CPK  247* No results found for: CHOL, CHOLX, CHLST, CHOLV, HDL, LDL, LDLC, DLDLP, TGLX, TRIGL, TRIGP, CHHD, CHHDX No results found for: [facility] Lab Results Component Value Date/Time  Color YELLOW/STRAW 09/03/2018 11:36 PM  
 Appearance CLEAR 09/03/2018 11:36 PM  
 Specific gravity 1.025 09/03/2018 11:36 PM  
 pH (UA) 6.0 09/03/2018 11:36 PM  
 Protein 30 (A) 09/03/2018 11:36 PM  
 Glucose NEGATIVE  09/03/2018 11:36 PM  
 Ketone TRACE (A) 09/03/2018 11:36 PM  
 Bilirubin NEGATIVE  09/03/2018 11:36 PM  
 Urobilinogen 2.0 (H) 09/03/2018 11:36 PM  
 Nitrites NEGATIVE  09/03/2018 11:36 PM  
 Leukocyte Esterase NEGATIVE  09/03/2018 11:36 PM  
 Epithelial cells FEW 09/03/2018 11:36 PM  
 Bacteria NEGATIVE  09/03/2018 11:36 PM  
 WBC 5-10 09/03/2018 11:36 PM  
 RBC 20-50 09/03/2018 11:36 PM  
 
 
 
Medications Reviewed:  
 
Current Facility-Administered Medications Medication Dose Route Frequency  [START ON 9/8/2018] amLODIPine (NORVASC) tablet 10 mg  10 mg Oral DAILY  amLODIPine (NORVASC) tablet 5 mg  5 mg Oral ONCE  
 amoxicillin-clavulanate (AUGMENTIN) 500-125 mg per tablet 1 Tab  1 Tab Oral Q8H  
 acetaminophen (TYLENOL) tablet 650 mg  650 mg Oral Q6H  
 lidocaine (LIDODERM) 5 % patch 1 Patch  1 Patch TransDERmal Q24H  colchicine tablet 0.6 mg  0.6 mg Oral DAILY  balsam peru-castor oil (VENELEX) [id number] mg/gram ointment   Topical Q8H  
 QUEtiapine (SEROquel) tablet 25 mg  25 mg Oral QHS  sodium chloride (NS) flush 5-10 mL  5-10 mL IntraVENous Q8H  
 sodium chloride (NS) flush 5-10 mL  5-10 mL IntraVENous PRN  
 senna-docusate (PERICOLACE) 8.6-50 mg per tablet 1 Tab  1 Tab Oral DAILY  polyethylene glycol (MIRALAX) packet 17 g  17 g Oral DAILY  hydrALAZINE (APRESOLINE) 20 mg/mL injection 10 mg  10 mg IntraVENous Q6H PRN  
 
______________________________________________________________________ EXPECTED LENGTH OF STAY: 3d 9h 
ACTUAL LENGTH OF STAY:          3 Raya Clay MD

## 2018-09-07 NOTE — PROGRESS NOTES
CM received page from staff nurse that their floor received call from [address] that [facility] has authorized pt's stay at [facility] and that Osvaldo in admissions at facility is aware. This CM called Uri, [phone], to reach [address] [place] [address]. However, Osvaldo is not currently on duty, was transferred to wing 3 by  to try to reach nursing supervisor of [facility]. Per [place], charge nurse and house supervisor Po Garzon were under the impression that pt is discharging to their facility on Saturday morning. Will update bedside RN. Requested [facility] transport for tomorrow and will leave handoff for Saturday CM. Pt will need discharge envelope to travel and PCS completed.    
 
ROLANDA Marquez

## 2018-09-07 NOTE — PROGRESS NOTES
Physical Therapy Attempted to see patient but per nursing just back to bed with elevated blood pressure. Will follow up later today as able.  
Rene Cisneros, PT

## 2018-09-07 NOTE — PROGRESS NOTES
Occupational Therapy  -  
9.7.2018 Chart reviewed in prep for OT session. RN reporting patient with elevated BP while up on [facility], request OT defer at this time while attempting to regulate BP. Will f/u later in PM vs. Monday as able and appropriate, thank you. Recommend with nursing patient to complete as able in order to maintain strength, endurance and independence: ADLs with supervision/setup, OOB to chair 3x/day and mobilizing to the [facility] for toileting with 2 assist. Thank you for your assistance. Ami Yoo, MS, OTR/L

## 2018-09-07 NOTE — PROGRESS NOTES
Bedside and Verbal shift change report given to [address] [place] [address] (oncoming nurse) by Sapphire Brady and Aparna Andres RN (offgoing nurse). Report included the following information SBAR, Kardex, Intake/Output, MAR, Recent Results and Med Rec Status.

## 2018-09-07 NOTE — WOUND CARE
WOUND CARE FOLLOW UP by nurse request:  
 
Admitted for bilateral pneumonia Past Medical History:  
Diagnosis Date  Dementia  Hypertension WBC = 6.6 on 9/7/18 Patient is alert and talking, incontinent with some assistance needed in repositioning. Bed:  P-500 Patient wearing briefs for incontinence Diet:  Cardiac Regular Bilateral heels and sacral skin intact with hyperpigmentation. 1.  POA sacral skin intact, there is still some induration on upper left buttock and sacrum. Will continue to monitor. Left buttock with small partial thickness wound approximately 1 cm x 0.8 cm x 0.1 cm, wound bed is pink, blanches well, wound edges are open, no drainage. Venelex ointment applied. 2.  POA right buttock with small crusted wound. 3.  POA left heel with dark spot, approximately 0.4 cm x 0.4 cm x 0 cm. 4.  Right lower back has two small pustules and right buttock has one pustule. Left open to air. Recommendations: 
Sacrum, buttocks and bilateral heels- Every 8 hours liberally apply Venelex ointment 
  
Specialty bed: P-500 on a [employer] frame ordered via [employer]. Use only flat sheet and one incontinence pad. Please call [facility] if not delivered. Confirmation #[id number] Skin Care & Pressure Prevention: 
Minimize layers of linen/pads under patient to optimize support surface. Turn/reposition approximately every 2 hours and offload heels. Manage incontinence / promote continence  
  
Discussed above plan with patient & Gaby Winters RN 
  
Transition of Care: Plan to follow as needed while admitted to hospital. 
  
CASANDRA Berman, RN, [facility], [facility]. 
office [phone] 
pager [phone] or call  to page

## 2018-09-08 VITALS
BODY MASS INDEX: 24.28 KG/M2 | WEIGHT: 123.68 LBS | HEIGHT: 60 IN | RESPIRATION RATE: 18 BRPM | OXYGEN SATURATION: 99 % | HEART RATE: 98 BPM | DIASTOLIC BLOOD PRESSURE: 87 MMHG | SYSTOLIC BLOOD PRESSURE: 164 MMHG | TEMPERATURE: 98.2 F

## 2018-09-08 PROCEDURE — 74011250637 HC RX REV CODE- 250/637: Performed by: HOSPITALIST

## 2018-09-08 PROCEDURE — 74011250637 HC RX REV CODE- 250/637: Performed by: INTERNAL MEDICINE

## 2018-09-08 PROCEDURE — 74011000250 HC RX REV CODE- 250: Performed by: INTERNAL MEDICINE

## 2018-09-08 RX ORDER — MIRTAZAPINE 30 MG/1
30 TABLET, FILM COATED ORAL
Qty: 30 TAB | Refills: 0 | Status: SHIPPED | OUTPATIENT
Start: 2018-09-08 | End: 2021-06-22

## 2018-09-08 RX ORDER — AMLODIPINE BESYLATE 10 MG/1
10 TABLET ORAL DAILY
Qty: 15 TAB | Refills: 0 | Status: SHIPPED | OUTPATIENT
Start: 2018-09-09 | End: 2021-06-22

## 2018-09-08 RX ORDER — AMOXICILLIN AND CLAVULANATE POTASSIUM 500; 125 MG/1; MG/1
1 TABLET, FILM COATED ORAL EVERY 8 HOURS
Qty: 12 TAB | Refills: 0 | Status: SHIPPED | OUTPATIENT
Start: 2018-09-08

## 2018-09-08 RX ADMIN — AMLODIPINE BESYLATE 10 MG: 5 TABLET ORAL at 08:55

## 2018-09-08 RX ADMIN — Medication 1 TABLET: at 08:55

## 2018-09-08 RX ADMIN — Medication 10 ML: at 06:00

## 2018-09-08 RX ADMIN — ACETAMINOPHEN 650 MG: 325 TABLET ORAL at 06:00

## 2018-09-08 RX ADMIN — COLCHICINE 0.6 MG: 0.6 TABLET, FILM COATED ORAL at 08:56

## 2018-09-08 RX ADMIN — POLYETHYLENE GLYCOL 3350 17 G: 17 POWDER, FOR SOLUTION ORAL at 08:55

## 2018-09-08 RX ADMIN — AMOXICILLIN AND CLAVULANATE POTASSIUM 1 TABLET: 500; 125 TABLET, FILM COATED ORAL at 06:00

## 2018-09-08 NOTE — PROGRESS NOTES
Problem: Pressure Injury - Risk of 
Goal: *Prevention of pressure injury Document Rivas Scale and appropriate interventions in the flowsheet. Offload heels Turn approximately every 2 hours Upgrade bed Outcome: Progressing Towards Goal 
Pressure Injury Interventions: 
Sensory Interventions: Assess changes in LOC Moisture Interventions: Absorbent underpads, Apply protective barrier, creams and emollients, Moisture barrier, Minimize layers Activity Interventions: Increase time out of bed, Pressure redistribution bed/mattress(bed type) Mobility Interventions: HOB 30 degrees or less, Pressure redistribution bed/mattress (bed type) Nutrition Interventions: Document food/fluid/supplement intake Friction and Shear Interventions: HOB 30 degrees or less, Lift sheet

## 2018-09-08 NOTE — PROGRESS NOTES
Problem: Falls - Risk of 
Goal: *Absence of Falls Document Cj Calvo Fall Risk and appropriate interventions in the flowsheet. Outcome: Progressing Towards Goal 
Fall Risk Interventions: 
Mobility Interventions: Bed/chair exit alarm, Communicate number of staff needed for ambulation/transfer Mentation Interventions: Adequate sleep, hydration, pain control, Bed/chair exit alarm, Door open when patient unattended, Evaluate medications/consider consulting pharmacy, Reorient patient, More frequent rounding, Room close to nurse's station, Toileting rounds, Update white board Medication Interventions: Evaluate medications/consider consulting pharmacy, Patient to call before getting OOB, Teach patient to arise slowly Elimination Interventions: Bed/chair exit alarm, Call light in reach, Toilet paper/wipes in reach, Toileting schedule/hourly rounds History of Falls Interventions: Bed/chair exit alarm, Evaluate medications/consider consulting pharmacy

## 2018-09-08 NOTE — PROGRESS NOTES
Bedside shift change report given to [address] (oncoming nurse) by Martha Letters (offgoing nurse). Report included the following information SBAR, Kardex and Recent Results.

## 2018-09-08 NOTE — DISCHARGE SUMMARY
[address] [facility]    DISCHARGE SUMMARY    Bj Reynoso  MR#: [id number]  : 1927  ACCOUNT #: [de-identified]   ADMIT DATE: 2018  DISCHARGE DATE:     DIAGNOSIS ON ADMISSION:  Pneumonia. DIAGNOSIS ON DISCHARGE:  Pneumonia. HOSPITAL COURSE:  The patient is a 80-year-old female who has a previous history significant for dementia and hypertension, who was brought to the emergency room due to right hip pain. The patient had a fall 2 days prior to admission. In the emergency room, CT scan of the abdomen showed patchy bilateral pneumonia. She was given a dose of Levaquin and was admitted for further evaluation. The patient remained stable. A left extremity Doppler was negative for DVT. There was popliteal fossa fluid of unclear significance. Right knee x-ray showed mild to moderate osteoarthritis with chondrocalcinosis. Uric acid was 1.9. For her pneumonia, she was treated with IV Zosyn. It was noticed that the patient coughs during drinking and eating which are risk factors for aspiration. The patient was demented and was continued on Seroquel. At this time, the patient is stable. Her last CBC was normal on       She will be discharged to rehabilitation facility on amlodipine 10 mg daily, Augmentin 1 tablet every 8 hours, colchicine 0.6 mg daily, Seroquel 25 mg daily, senna 1 tablet daily and mirtazapine 30 mg daily. She will need speech consult to evaluate her swallowing at rehabilitation place. DISPOSITION:  Nursing home.       MD DEION Stevenson/RANI  D: 2018 11:16     T: 2018 12:58  JOB #: [id number]

## 2018-09-08 NOTE — PROGRESS NOTES
CM follow up. CM spoke with [facility], who confirmed that patient is accepted and that bed is available today . Nurse Report to be called to [phone]. CM sent referral via [employer] to  [address] [facility]) (Phone [phone]) for BLS transport, and referral was accepted  for a  12:30PM . CM completed PCS and placed it on chart. CM gave verbal update to staff nurse.

## 2018-09-09 LAB
BACTERIA SPEC CULT: NORMAL
SERVICE CMNT-IMP: NORMAL

## 2019-08-14 ENCOUNTER — HOSPITAL ENCOUNTER (OUTPATIENT)
Dept: GENERAL RADIOLOGY | Age: 84
Discharge: HOME | End: 2019-08-14
Attending: INTERNAL MEDICINE
Payer: COMMERCIAL

## 2019-08-14 DIAGNOSIS — T17.908A: ICD-10-CM

## 2019-08-14 PROCEDURE — 74220 X-RAY XM ESOPHAGUS 1CNTRST: CPT

## 2021-06-21 ENCOUNTER — TRANSCRIBE ORDER (OUTPATIENT)
Dept: SCHEDULING | Age: 86
End: 2021-06-21

## 2021-06-21 ENCOUNTER — HOSPITAL ENCOUNTER (OUTPATIENT)
Dept: CT IMAGING | Age: 86
Discharge: HOME | End: 2021-06-21
Attending: FAMILY MEDICINE
Payer: MEDICARE

## 2021-06-21 DIAGNOSIS — R29.810 FACIAL DROOP: Primary | ICD-10-CM

## 2021-06-21 DIAGNOSIS — R29.810 FACIAL DROOP: ICD-10-CM

## 2021-06-21 PROCEDURE — 70450 CT HEAD/BRAIN W/O DYE: CPT

## 2021-06-22 ENCOUNTER — APPOINTMENT (OUTPATIENT)
Dept: GENERAL RADIOLOGY | Age: 86
DRG: 065 | End: 2021-06-22
Attending: STUDENT IN AN ORGANIZED HEALTH CARE EDUCATION/TRAINING PROGRAM
Payer: MEDICARE

## 2021-06-22 ENCOUNTER — APPOINTMENT (OUTPATIENT)
Dept: CT IMAGING | Age: 86
DRG: 065 | End: 2021-06-22
Attending: STUDENT IN AN ORGANIZED HEALTH CARE EDUCATION/TRAINING PROGRAM
Payer: MEDICARE

## 2021-06-22 ENCOUNTER — HOSPITAL ENCOUNTER (INPATIENT)
Age: 86
LOS: 6 days | Discharge: HOSPICE | DRG: 065 | End: 2021-06-28
Attending: STUDENT IN AN ORGANIZED HEALTH CARE EDUCATION/TRAINING PROGRAM | Admitting: STUDENT IN AN ORGANIZED HEALTH CARE EDUCATION/TRAINING PROGRAM
Payer: MEDICARE

## 2021-06-22 DIAGNOSIS — F02.818 LATE ONSET ALZHEIMER'S DEMENTIA WITH BEHAVIORAL DISTURBANCE (HCC): ICD-10-CM

## 2021-06-22 DIAGNOSIS — R41.0 DELIRIUM: ICD-10-CM

## 2021-06-22 DIAGNOSIS — R29.898 RIGHT ARM WEAKNESS: ICD-10-CM

## 2021-06-22 DIAGNOSIS — R29.810 FACIAL WEAKNESS: ICD-10-CM

## 2021-06-22 DIAGNOSIS — Z71.89 GOALS OF CARE, COUNSELING/DISCUSSION: ICD-10-CM

## 2021-06-22 DIAGNOSIS — R53.81 PHYSICAL DEBILITY: ICD-10-CM

## 2021-06-22 DIAGNOSIS — G30.1 LATE ONSET ALZHEIMER'S DEMENTIA WITH BEHAVIORAL DISTURBANCE (HCC): ICD-10-CM

## 2021-06-22 DIAGNOSIS — R45.1 AGITATION: ICD-10-CM

## 2021-06-22 DIAGNOSIS — R47.1 DYSARTHRIA: ICD-10-CM

## 2021-06-22 DIAGNOSIS — I63.9 LEFT PONTINE STROKE (HCC): ICD-10-CM

## 2021-06-22 DIAGNOSIS — R41.82 ALTERED MENTAL STATUS, UNSPECIFIED ALTERED MENTAL STATUS TYPE: Primary | ICD-10-CM

## 2021-06-22 DIAGNOSIS — R63.0 ANOREXIA: ICD-10-CM

## 2021-06-22 PROBLEM — R62.7 FTT (FAILURE TO THRIVE) IN ADULT: Status: ACTIVE | Noted: 2021-06-22

## 2021-06-22 LAB
ALBUMIN SERPL-MCNC: 3.6 G/DL (ref 3.5–5)
ALBUMIN/GLOB SERPL: 0.8 {RATIO} (ref 1.1–2.2)
ALP SERPL-CCNC: 102 U/L (ref 45–117)
ALT SERPL-CCNC: 26 U/L (ref 12–78)
ANION GAP SERPL CALC-SCNC: 7 MMOL/L (ref 5–15)
APPEARANCE UR: ABNORMAL
AST SERPL-CCNC: 26 U/L (ref 15–37)
BACTERIA URNS QL MICRO: ABNORMAL /HPF
BASOPHILS # BLD: 0.1 K/UL (ref 0–0.1)
BASOPHILS NFR BLD: 1 % (ref 0–1)
BILIRUB SERPL-MCNC: 0.6 MG/DL (ref 0.2–1)
BILIRUB UR QL CFM: NEGATIVE
BUN SERPL-MCNC: 18 MG/DL (ref 6–20)
BUN/CREAT SERPL: 18 (ref 12–20)
CALCIUM SERPL-MCNC: 9.4 MG/DL (ref 8.5–10.1)
CHLORIDE SERPL-SCNC: 109 MMOL/L (ref 97–108)
CO2 SERPL-SCNC: 27 MMOL/L (ref 21–32)
COLOR UR: ABNORMAL
COMMENT, HOLDF: NORMAL
CREAT SERPL-MCNC: 1.02 MG/DL (ref 0.55–1.02)
DIFFERENTIAL METHOD BLD: ABNORMAL
EOSINOPHIL # BLD: 0.2 K/UL (ref 0–0.4)
EOSINOPHIL NFR BLD: 3 % (ref 0–7)
EPITH CASTS URNS QL MICRO: ABNORMAL /LPF
ERYTHROCYTE [DISTWIDTH] IN BLOOD BY AUTOMATED COUNT: 13.1 % (ref 11.5–14.5)
GLOBULIN SER CALC-MCNC: 4.3 G/DL (ref 2–4)
GLUCOSE SERPL-MCNC: 75 MG/DL (ref 65–100)
GLUCOSE UR STRIP.AUTO-MCNC: NEGATIVE MG/DL
HCT VFR BLD AUTO: 44.9 % (ref 35–47)
HGB BLD-MCNC: 13.3 G/DL (ref 11.5–16)
HGB UR QL STRIP: NEGATIVE
IMM GRANULOCYTES # BLD AUTO: 0 K/UL (ref 0–0.04)
IMM GRANULOCYTES NFR BLD AUTO: 0 % (ref 0–0.5)
KETONES UR QL STRIP.AUTO: 15 MG/DL
LEUKOCYTE ESTERASE UR QL STRIP.AUTO: ABNORMAL
LYMPHOCYTES # BLD: 1.4 K/UL (ref 0.8–3.5)
LYMPHOCYTES NFR BLD: 25 % (ref 12–49)
MCH RBC QN AUTO: 27.7 PG (ref 26–34)
MCHC RBC AUTO-ENTMCNC: 29.6 G/DL (ref 30–36.5)
MCV RBC AUTO: 93.3 FL (ref 80–99)
MONOCYTES # BLD: 0.6 K/UL (ref 0–1)
MONOCYTES NFR BLD: 11 % (ref 5–13)
NEUTS SEG # BLD: 3.4 K/UL (ref 1.8–8)
NEUTS SEG NFR BLD: 60 % (ref 32–75)
NITRITE UR QL STRIP.AUTO: NEGATIVE
NRBC # BLD: 0 K/UL (ref 0–0.01)
NRBC BLD-RTO: 0 PER 100 WBC
PH UR STRIP: 5 [PH] (ref 5–8)
PLATELET # BLD AUTO: 220 K/UL (ref 150–400)
PMV BLD AUTO: 12.5 FL (ref 8.9–12.9)
POTASSIUM SERPL-SCNC: 4.2 MMOL/L (ref 3.5–5.1)
PROT SERPL-MCNC: 7.9 G/DL (ref 6.4–8.2)
PROT UR STRIP-MCNC: 30 MG/DL
RBC # BLD AUTO: 4.81 M/UL (ref 3.8–5.2)
RBC #/AREA URNS HPF: ABNORMAL /HPF (ref 0–5)
SAMPLES BEING HELD,HOLD: NORMAL
SODIUM SERPL-SCNC: 143 MMOL/L (ref 136–145)
SP GR UR REFRACTOMETRY: 1.02 (ref 1–1.03)
TROPONIN I SERPL-MCNC: <0.05 NG/ML
UR CULT HOLD, URHOLD: NORMAL
UROBILINOGEN UR QL STRIP.AUTO: 1 EU/DL (ref 0.2–1)
WBC # BLD AUTO: 5.6 K/UL (ref 3.6–11)
WBC URNS QL MICRO: ABNORMAL /HPF (ref 0–4)

## 2021-06-22 PROCEDURE — 87086 URINE CULTURE/COLONY COUNT: CPT

## 2021-06-22 PROCEDURE — 85025 COMPLETE CBC W/AUTO DIFF WBC: CPT

## 2021-06-22 PROCEDURE — 70498 CT ANGIOGRAPHY NECK: CPT

## 2021-06-22 PROCEDURE — 74011000636 HC RX REV CODE- 636: Performed by: RADIOLOGY

## 2021-06-22 PROCEDURE — 81001 URINALYSIS AUTO W/SCOPE: CPT

## 2021-06-22 PROCEDURE — 70450 CT HEAD/BRAIN W/O DYE: CPT

## 2021-06-22 PROCEDURE — 71045 X-RAY EXAM CHEST 1 VIEW: CPT

## 2021-06-22 PROCEDURE — 74011000258 HC RX REV CODE- 258: Performed by: STUDENT IN AN ORGANIZED HEALTH CARE EDUCATION/TRAINING PROGRAM

## 2021-06-22 PROCEDURE — 87040 BLOOD CULTURE FOR BACTERIA: CPT

## 2021-06-22 PROCEDURE — 80053 COMPREHEN METABOLIC PANEL: CPT

## 2021-06-22 PROCEDURE — 70496 CT ANGIOGRAPHY HEAD: CPT

## 2021-06-22 PROCEDURE — 99285 EMERGENCY DEPT VISIT HI MDM: CPT

## 2021-06-22 PROCEDURE — 65660000000 HC RM CCU STEPDOWN

## 2021-06-22 PROCEDURE — 93005 ELECTROCARDIOGRAM TRACING: CPT

## 2021-06-22 PROCEDURE — 74011250636 HC RX REV CODE- 250/636: Performed by: STUDENT IN AN ORGANIZED HEALTH CARE EDUCATION/TRAINING PROGRAM

## 2021-06-22 PROCEDURE — 36415 COLL VENOUS BLD VENIPUNCTURE: CPT

## 2021-06-22 PROCEDURE — 84484 ASSAY OF TROPONIN QUANT: CPT

## 2021-06-22 RX ORDER — QUETIAPINE FUMARATE 50 MG/1
TABLET, FILM COATED ORAL
COMMUNITY
Start: 2021-04-04 | End: 2021-06-22

## 2021-06-22 RX ORDER — KETOCONAZOLE 20 MG/ML
SHAMPOO TOPICAL
COMMUNITY
Start: 2021-03-22

## 2021-06-22 RX ORDER — SODIUM CHLORIDE 9 MG/ML
125 INJECTION, SOLUTION INTRAVENOUS CONTINUOUS
Status: DISCONTINUED | OUTPATIENT
Start: 2021-06-22 | End: 2021-06-23

## 2021-06-22 RX ORDER — PANTOPRAZOLE SODIUM 40 MG/1
TABLET, DELAYED RELEASE ORAL
COMMUNITY
Start: 2021-05-25 | End: 2021-06-22

## 2021-06-22 RX ORDER — AMLODIPINE BESYLATE 5 MG/1
TABLET ORAL
COMMUNITY
Start: 2021-05-25 | End: 2021-06-22

## 2021-06-22 RX ADMIN — SODIUM CHLORIDE 75 ML/HR: 9 INJECTION, SOLUTION INTRAVENOUS at 21:39

## 2021-06-22 RX ADMIN — CEFTRIAXONE SODIUM 1 G: 1 INJECTION, POWDER, FOR SOLUTION INTRAMUSCULAR; INTRAVENOUS at 21:39

## 2021-06-22 RX ADMIN — IOPAMIDOL 100 ML: 755 INJECTION, SOLUTION INTRAVENOUS at 17:29

## 2021-06-22 RX ADMIN — SODIUM CHLORIDE 1000 ML: 900 INJECTION, SOLUTION INTRAVENOUS at 14:16

## 2021-06-22 NOTE — SENIOR SERVICES NOTE
SSED Visit. Chart Reviewed: Last ED Admission 9/2018. HX Dementia, HTN. Patient greeted in room, patient is Non-Verbal. Family in the waiting room contacted and asked to come back to patients room. Per Master Meyers, patient daughter at the bedside, she states that Sunday the patient was in here normal state, chatty and up and moving with her wheelchair/ walker. Ada Russo states that the patient fell asleep in the chair and about 0200 Monday morning she woke the patient up to get her into bed and \"her mouth was twisted. \" Ada Russo states that she called [facility], Dr. Link Islas and the patient had an outpatient CT Head scan. Today, patient continued to \"not act as usual\" and they brought her into the ED. She endorses recent decreased appetite, no known falls, she was on Hospice about 2 years ago and Hospice stated that they couldn't continue b/c the patient was \"never sick. \"   PCP: Dr. Geneva Estrada:  -Exterior Ramp in place.   [facility] bed  -walker  -wheelchair; predominantly wheelchair bound  -Assisted to MD appts. either by daughter, Fred Jacque or Doc Gee for by son and grand son as well as daughter.  -Patient receives 66 hours weekly of in home care through [facility]  -Has not been taking her Seroquel, medication reconciliation performed with daughter, Fred Santillan. Fred Santillan states that the patients PCP called in a prescription for Augmentin last week for what she described as UTI symptoms, however patient was not seen by MD nor gave urine sample, RX was simply called in but never started per Fred Santillan. Advance Care Planning- discussed with Vianey Finney, Master Meyers, son and grand son at [facility]. Advance Care Planning (ACP) Physician/NP/PA Conversation          Conducted with: Patient with Decision Making Capacity and Healthcare Decision Maker: Named in Advance Directive or Healthcare Power of Anthony Chapa, H# [phone], C# [phone].     Healthcare Decision Maker:primary  Ada Russo Kiya Flores, # [phone], C# [phone]. Care Preferences:  Hospitalization: \"If your health worsens and it becomes clear that your chance of recovery is unlikely, what would be your preference regarding hospitalization? \"  The patient would prefer hospitalization. Family wishes for patient to be admitted. Ventilation: \"If you were unable to breathe on your own and your chance of recovery was unlikely, what would be your preference about the use of a ventilator (breathing machine) if it was available to you? \"   The patient would desire the use of a ventilator. The family is choosing a ventilator. Resuscitation: \"In the event your heart stopped as a result of an underlying serious health condition, would you want attempts to be made to restart your heart, or would you prefer a natural death? \"   Yes, attempt to resuscitate. Additional topics discussed: artificial nutrition, ventilation preferences, hospitalization preferences and resuscitation preferences    Conversation Outcomes / Follow-Up Plan:   ACP complete - no further action today  Reviewed DNR/DNI and patient elects Full Code (Attempt Resuscitation)   Sherryle Naas- healthcare decision maker, elects to have Full Code, CPR, with intubation if required, artificial feeding tubes, and central line if needed. Roldan Ballard states that the patient was on Hospice about 2 years ago and that she is no longer on Hospice and does not wish for her to be on Hospice at this time. Length of Voluntary ACP Conversation in minutes:  16 minutes    Patient to be admitted. I will collaborate findings with Dr. Trinidad Layton. Medication reconciliation performed. ACP documentation complete, FULL CODE. Barbara Clay NP   Patient family has been requested to provide documentation for POA/ HCPOA.    5:30 PM  SSED

## 2021-06-22 NOTE — ED NOTES
Bedside shift change report given to MOHAN Chin (oncoming nurse) by Soy Meadows RN (offgoing nurse). Report included the following information SBAR, ED Summary, Intake/Output, MAR and Recent Results.

## 2021-06-22 NOTE — ED NOTES
Straight cath performed using sterile technique. Pt cleaned and repositioned for comfort. Urine specimen sent to lab.

## 2021-06-22 NOTE — H&P
History & Physical    Primary Care Provider: Andrei Rosales MD  Source of Information: Chart review    History of Presenting Illness:   Jose A Stovall is a 80 y.o. female w/ pmh of htn and dementia who is brought to ER by family for increasing debility and refusal to eat or drink. History is provided by family at bedside as patient is unable to do so given her advanced dementia. Family states patient had been in her baseline health until about 3-1/2 days ago when they noticed a progressive decline in her mentation as well as p.o. tolerance. 24 hours prior to ER presentation, family state that they noted her right facial droop she was seen at [facility] 2 days ago and was discharged back to home. Additionally, family states patient's PCP was consulted and prescribed an antibiotic for presumed UTI which was never started. Vitals on ER presentation remarkable for BPs to 863R systolic. Labs overall unremarkable and at patient's baseline. UA showed ketones, leukocyte esterases and 3+ bacteria. Chest x-ray, CT head, CTA head and neck showed no acute process. Review of Systems:  Review of systems not obtained due to patient factors. Past Medical History:   Diagnosis Date    Dementia ([facility] [address]     Hypertension       History reviewed. No pertinent surgical history. Prior to Admission medications    Medication Sig Start Date End Date Taking? Authorizing Provider   ketoconazole (NIZORAL) 2 % shampoo PLEASE SEE ATTACHED FOR DETAILED DIRECTIONS 3/22/21  Yes Other, MD Corky   acetaminophen (TYLENOL ARTHRITIS PAIN) 650 mg TbER Take 650 mg by mouth daily. Yes Elvis Saldana MD   QUEtiapine (SEROQUEL) 25 mg tablet Take 25 mg by mouth nightly. Yes Elvis Saldana MD   amoxicillin-clavulanate (AUGMENTIN) 500-125 mg per tablet Take 1 Tab by mouth every eight (8) hours.   Patient not taking: Reported on 6/22/2021 9/8/18   Val Wilder MD     No Known Allergies   History reviewed. No pertinent family history. SOCIAL HISTORY:  Patient resides:  Independently    Assisted Living    SNF    With family care X      Smoking history:   None X   Former    Chronic      Alcohol history:   None X   Social    Chronic      Ambulates: Nonambulatory  Independently    w/cane    w/walker    w/wc    CODE STATUS:  DNR    Full X   Other      Objective:     Physical Exam:     Visit Vitals  BP (!) 150/64   Pulse (!) 55   Temp 98 °F (36.7 °C)   Resp 15   SpO2 95%      O2 Device: None    General:  Alert, no distress, appears stated age. Head:  Normocephalic, without obvious abnormality, atraumatic. Eyes:  Conjunctivae/corneas clear. PERRL, EOMs intact. Nose: Nares normal. Septum midline. Mucosa normal.        Neck: Supple, symmetrical, trachea midline       Lungs:   Clear to auscultation bilaterally. Chest wall:  No tenderness or deformity. Heart:  Regular rate and rhythm, S1, S2 normal   Abdomen:   Soft, non-tender. Bowel sounds normal. No masses,  No organomegaly. Extremities: Extremities normal, atraumatic, no cyanosis or edema. Pulses: 2+ and symmetric all extremities. Skin: Skin color, texture, turgor normal. No rashes or lesions   Neurologic: CNII-XII grossly intact. Right facial droop     ECG: Normal sinus rhythm  Data Review:     Recent Days:  Recent Labs     06/22/21  1410   WBC 5.6   HGB 13.3   HCT 44.9        Recent Labs     06/22/21  1410      K 4.2   *   CO2 27   GLU 75   BUN 18   CREA 1.02   CA 9.4   ALB 3.6   ALT 26     No results for input(s): PH, PCO2, PO2, HCO3, FIO2 in the last 72 hours. 24 Hour Results:  Recent Results (from the past 24 hour(s))   SAMPLES BEING HELD    Collection Time: 06/22/21  2:10 PM   Result Value Ref Range    SAMPLES BEING HELD 1RED 1BLU     COMMENT        Add-on orders for these samples will be processed based on acceptable specimen integrity and analyte stability, which may vary by analyte.    CBC WITH AUTOMATED DIFF    Collection Time: 06/22/21  2:10 PM   Result Value Ref Range    WBC 5.6 3.6 - 11.0 K/uL    RBC 4.81 3.80 - 5.20 M/uL    HGB 13.3 11.5 - 16.0 g/dL    HCT 44.9 35.0 - 47.0 %    MCV 93.3 80.0 - 99.0 FL    MCH 27.7 26.0 - 34.0 PG    MCHC 29.6 (L) 30.0 - 36.5 g/dL    RDW 13.1 11.5 - 14.5 %    PLATELET 163 472 - 248 K/uL    MPV 12.5 8.9 - 12.9 FL    NRBC 0.0 0  WBC    ABSOLUTE NRBC 0.00 0.00 - 0.01 K/uL    NEUTROPHILS 60 32 - 75 %    LYMPHOCYTES 25 12 - 49 %    MONOCYTES 11 5 - 13 %    EOSINOPHILS 3 0 - 7 %    BASOPHILS 1 0 - 1 %    IMMATURE GRANULOCYTES 0 0.0 - 0.5 %    ABS. NEUTROPHILS 3.4 1.8 - 8.0 K/UL    ABS. LYMPHOCYTES 1.4 0.8 - 3.5 K/UL    ABS. MONOCYTES 0.6 0.0 - 1.0 K/UL    ABS. EOSINOPHILS 0.2 0.0 - 0.4 K/UL    ABS. BASOPHILS 0.1 0.0 - 0.1 K/UL    ABS. IMM. GRANS. 0.0 0.00 - 0.04 K/UL    DF AUTOMATED     METABOLIC PANEL, COMPREHENSIVE    Collection Time: 06/22/21  2:10 PM   Result Value Ref Range    Sodium 143 136 - 145 mmol/L    Potassium 4.2 3.5 - 5.1 mmol/L    Chloride 109 (H) 97 - 108 mmol/L    CO2 27 21 - 32 mmol/L    Anion gap 7 5 - 15 mmol/L    Glucose 75 65 - 100 mg/dL    BUN 18 6 - 20 MG/DL    Creatinine 1.02 0.55 - 1.02 MG/DL    BUN/Creatinine ratio 18 12 - 20      GFR est AA >60 >60 ml/min/1.73m2    GFR est non-AA 51 (L) >60 ml/min/1.73m2    Calcium 9.4 8.5 - 10.1 MG/DL    Bilirubin, total 0.6 0.2 - 1.0 MG/DL    ALT (SGPT) 26 12 - 78 U/L    AST (SGOT) 26 15 - 37 U/L    Alk.  phosphatase 102 45 - 117 U/L    Protein, total 7.9 6.4 - 8.2 g/dL    Albumin 3.6 3.5 - 5.0 g/dL    Globulin 4.3 (H) 2.0 - 4.0 g/dL    A-G Ratio 0.8 (L) 1.1 - 2.2     TROPONIN I    Collection Time: 06/22/21  2:10 PM   Result Value Ref Range    Troponin-I, Qt. <0.05 <0.05 ng/mL   EKG, 12 LEAD, INITIAL    Collection Time: 06/22/21  2:20 PM   Result Value Ref Range    Ventricular Rate 60 BPM    Atrial Rate 60 BPM    P-R Interval 118 ms    QRS Duration 54 ms    Q-T Interval 462 ms    QTC Calculation (Bezet) 462 ms    Calculated P Axis 65 degrees    Calculated R Axis 37 degrees    Calculated T Axis 29 degrees    Diagnosis       Normal sinus rhythm  When compared with ECG of 03-SEP-2018 21:05,  T wave inversion no longer evident in Anterior leads     URINALYSIS W/MICROSCOPIC    Collection Time: 06/22/21  3:03 PM   Result Value Ref Range    Color DARK YELLOW      Appearance CLOUDY (A) CLEAR      Specific gravity 1.023 1.003 - 1.030      pH (UA) 5.0 5.0 - 8.0      Protein 30 (A) NEG mg/dL    Glucose Negative NEG mg/dL    Ketone 15 (A) NEG mg/dL    Blood Negative NEG      Urobilinogen 1.0 0.2 - 1.0 EU/dL    Nitrites Negative NEG      Leukocyte Esterase TRACE (A) NEG      WBC 5-10 0 - 4 /hpf    RBC 0-5 0 - 5 /hpf    Epithelial cells FEW FEW /lpf    Bacteria 3+ (A) NEG /hpf   URINE CULTURE HOLD SAMPLE    Collection Time: 06/22/21  3:03 PM    Specimen: Serum; Urine   Result Value Ref Range    Urine culture hold        Urine on hold in Microbiology dept for 2 days. If unpreserved urine is submitted, it cannot be used for addtional testing after 24 hours, recollection will be required. BILIRUBIN, CONFIRM    Collection Time: 06/22/21  3:03 PM   Result Value Ref Range    Bilirubin UA, confirm Negative NEG           Imaging:     Assessment:     Tami Salazar is a 80 y.o. female w/ pmh of htn and dementia who is admitted for failure to thrive and concern for acute CVA       Plan:       FTT / Progressive Dementia  ? CVA  -Obtain MRI brain  -PT, OT, SLP, case management consult  -Keep n.p.o. until speech eval  -Palliative care consult    Hypertension  -As needed hydralazine    Dementia  -Resume Seroquel for one SLP eval    Acute cystitis  -Rocephin 1 g daily  -Follow blood and urine cultures    Intravascular volume depletion  -Continue MIVF          POA: July Ducking: cell: [phone]           Carlos Beath: [phone]    FEN/GI -  NS @ 100 ml/hr  Activity - as tolerated  DVT prophylaxis - scds  GI prophylaxis - NI  Disposition - TBD    CODE STATUS:  Full code       Signed By: Josiah Bravo MD     June 22, 2021

## 2021-06-22 NOTE — ED PROVIDER NOTES
Chief Complaint   Patient presents with    Fatigue     History and review of systems limited by dementia, the patient is nonverbal at baseline and unable to provide any meaningful history. This is a 69-year-old female with a history of hypertension and severe dementia by EMS for increased somnolence and altered mental status, she had stopped taking her Seroquel and had not been eating or drinking now for several days. She was seen at a local primary care practice and sent to [facility] to have a noncontrast CT head performed which was ultimately unrevealing, she was never seen through the emergency department at the time but her Jerris Callander also notes that she now has stiffening of her left upper extremity and a left facial droop that they first appreciated 2 days ago. She is largely wheelchair-bound and cared for by her son and grandson at home who assist with activities of daily living and transfer her in and out of the wheelchair is necessary. She has not had a stroke previously to their knowledge but is nonverbal at baseline. No other recent changes in her health. Symptoms are severe in nature without any alleviating or exacerbating factors. Past Medical History:   Diagnosis Date    Dementia ([address]     Hypertension        History reviewed. No pertinent surgical history. History reviewed. No pertinent family history.     Social History     Socioeconomic History    Marital status:      Spouse name: Not on file    Number of children: Not on file    Years of education: Not on file    Highest education level: Not on file   Occupational History    Not on file   Tobacco Use    Smoking status: Never Smoker    Smokeless tobacco: Never Used   Substance and Sexual Activity    Alcohol use: No    Drug use: No    Sexual activity: Not on file   Other Topics Concern    Not on file   Social History Narrative    Not on file     Social Determinants of Health     Financial Resource Strain:     Difficulty of Paying Living Expenses:    Food Insecurity:     Worried About Running Out of Food in the Last Year:     [address] in the Last Year:    Transportation Needs:     Lack of Transportation (Medical):  Lack of Transportation (Non-Medical):    Physical Activity:     Days of Exercise per Week:     Minutes of Exercise per Session:    Stress:     Feeling of Stress :    Social Connections:     Frequency of Communication with Friends and Family:     Frequency of Social Gatherings with Friends and Family:     Attends Bahai Services:     Active Member of Clubs or Organizations:     Attends Club or Organization Meetings:     Marital Status:    Intimate Partner Violence:     Fear of Current or Ex-Partner:     Emotionally Abused:     Physically Abused:     Sexually Abused: ALLERGIES: Patient has no known allergies.     Review of Systems   Unable to perform ROS: Dementia       Vitals:    06/22/21 1515 06/22/21 1630 06/22/21 1730 06/22/21 1830   BP: (!) 167/56 (!) 151/57 (!) 130/51 (!) 161/60   Pulse: 65 63 65 65   Resp: 20 17 18 17   Temp:       SpO2: 99% 97% 97% 97%            Physical Exam  General:  Awake and alert, NAD  HEENT:  NC/AT, equal pupils, moist mucous membranes  Neck:   Normal inspection, full range of motion  Cardiac:  RRR, no murmurs  Respiratory:  Clear bilaterally, no wheezes, rales, rhonchi  Abdomen:  Soft and nontender, nondistended  Extremities: Warm and well perfused, no peripheral edema  Neuro:  +Nonverbal, not following commands, left sided facial droop, contractured left upper extremity, not able to move the lower extremities above gravity  Skin:   No rashes or pallor    RESULTS  Recent Results (from the past 12 hour(s))   SAMPLES BEING HELD    Collection Time: 06/22/21  2:10 PM   Result Value Ref Range    SAMPLES BEING HELD 1RED 1BLU     COMMENT        Add-on orders for these samples will be processed based on acceptable specimen integrity and analyte stability, which may vary by analyte. CBC WITH AUTOMATED DIFF    Collection Time: 06/22/21  2:10 PM   Result Value Ref Range    WBC 5.6 3.6 - 11.0 K/uL    RBC 4.81 3.80 - 5.20 M/uL    HGB 13.3 11.5 - 16.0 g/dL    HCT 44.9 35.0 - 47.0 %    MCV 93.3 80.0 - 99.0 FL    MCH 27.7 26.0 - 34.0 PG    MCHC 29.6 (L) 30.0 - 36.5 g/dL    RDW 13.1 11.5 - 14.5 %    PLATELET 733 701 - 260 K/uL    MPV 12.5 8.9 - 12.9 FL    NRBC 0.0 0  WBC    ABSOLUTE NRBC 0.00 0.00 - 0.01 K/uL    NEUTROPHILS 60 32 - 75 %    LYMPHOCYTES 25 12 - 49 %    MONOCYTES 11 5 - 13 %    EOSINOPHILS 3 0 - 7 %    BASOPHILS 1 0 - 1 %    IMMATURE GRANULOCYTES 0 0.0 - 0.5 %    ABS. NEUTROPHILS 3.4 1.8 - 8.0 K/UL    ABS. LYMPHOCYTES 1.4 0.8 - 3.5 K/UL    ABS. MONOCYTES 0.6 0.0 - 1.0 K/UL    ABS. EOSINOPHILS 0.2 0.0 - 0.4 K/UL    ABS. BASOPHILS 0.1 0.0 - 0.1 K/UL    ABS. IMM. GRANS. 0.0 0.00 - 0.04 K/UL    DF AUTOMATED     METABOLIC PANEL, COMPREHENSIVE    Collection Time: 06/22/21  2:10 PM   Result Value Ref Range    Sodium 143 136 - 145 mmol/L    Potassium 4.2 3.5 - 5.1 mmol/L    Chloride 109 (H) 97 - 108 mmol/L    CO2 27 21 - 32 mmol/L    Anion gap 7 5 - 15 mmol/L    Glucose 75 65 - 100 mg/dL    BUN 18 6 - 20 MG/DL    Creatinine 1.02 0.55 - 1.02 MG/DL    BUN/Creatinine ratio 18 12 - 20      GFR est AA >60 >60 ml/min/1.73m2    GFR est non-AA 51 (L) >60 ml/min/1.73m2    Calcium 9.4 8.5 - 10.1 MG/DL    Bilirubin, total 0.6 0.2 - 1.0 MG/DL    ALT (SGPT) 26 12 - 78 U/L    AST (SGOT) 26 15 - 37 U/L    Alk.  phosphatase 102 45 - 117 U/L    Protein, total 7.9 6.4 - 8.2 g/dL    Albumin 3.6 3.5 - 5.0 g/dL    Globulin 4.3 (H) 2.0 - 4.0 g/dL    A-G Ratio 0.8 (L) 1.1 - 2.2     TROPONIN I    Collection Time: 06/22/21  2:10 PM   Result Value Ref Range    Troponin-I, Qt. <0.05 <0.05 ng/mL   EKG, 12 LEAD, INITIAL    Collection Time: 06/22/21  2:20 PM   Result Value Ref Range    Ventricular Rate 60 BPM    Atrial Rate 60 BPM    P-R Interval 118 ms    QRS Duration 54 ms    Q-T Interval 462 ms    QTC Calculation (Bezet) 462 ms    Calculated P Axis 65 degrees    Calculated R Axis 37 degrees    Calculated T Axis 29 degrees    Diagnosis       Normal sinus rhythm  When compared with ECG of 03-SEP-2018 21:05,  T wave inversion no longer evident in Anterior leads     URINALYSIS W/MICROSCOPIC    Collection Time: 06/22/21  3:03 PM   Result Value Ref Range    Color DARK YELLOW      Appearance CLOUDY (A) CLEAR      Specific gravity 1.023 1.003 - 1.030      pH (UA) 5.0 5.0 - 8.0      Protein 30 (A) NEG mg/dL    Glucose Negative NEG mg/dL    Ketone 15 (A) NEG mg/dL    Blood Negative NEG      Urobilinogen 1.0 0.2 - 1.0 EU/dL    Nitrites Negative NEG      Leukocyte Esterase TRACE (A) NEG      WBC 5-10 0 - 4 /hpf    RBC 0-5 0 - 5 /hpf    Epithelial cells FEW FEW /lpf    Bacteria 3+ (A) NEG /hpf   URINE CULTURE HOLD SAMPLE    Collection Time: 06/22/21  3:03 PM    Specimen: Serum; Urine   Result Value Ref Range    Urine culture hold        Urine on hold in Microbiology dept for 2 days. If unpreserved urine is submitted, it cannot be used for addtional testing after 24 hours, recollection will be required. BILIRUBIN, CONFIRM    Collection Time: 06/22/21  3:03 PM   Result Value Ref Range    Bilirubin UA, confirm Negative NEG          IMAGING  CT HEAD WO CONT    Result Date: 6/22/2021  No acute findings. CTA HEAD    Result Date: 6/22/2021  No significant lesion demonstrated. CTA NECK    Result Date: 6/22/2021  No significant lesion demonstrated. XR CHEST PORT    Result Date: 6/22/2021  No acute changes. Procedures - none unless documented below  EKG as interpreted by me:  Normal sinus rhythm at a rate of 60, normal axis and intervals, some artifactual distortion present but grossly no ST or T wave changes suggesting acute ischemia. ED course: Labs, EKG and imaging reviewed.   Presents not eating or drinking or taking her medications now for several days, increasing somnolence, also has a contractured left upper extremity that family did not notice until 2 days ago. Needs goals of care discussion, IV hydration, stroke investigation, discussed with the hospitalist.      Hospitalist Valentino Serve for Admission  6:40 PM    ED Room Number:   ER29/29  Patient Name and age:  Keena Hendrix 80 y.o.  female  Working Diagnosis:     1. Altered mental status, unspecified altered mental status type      COVID suspicion:   no  Code Status:    Full Code  Readmission:    no  Isolation Requirements:  no  Recommended Level of Care: telemetry  Department:    [facility] Adult ED - 21   Other:     Not eating or drinking or taking her medications for several days, increasingly somnolent, also has a contractured left upper extremity that family did not notice until 2 days ago. Suspect completed stroke. Needs goals of care discussion.

## 2021-06-22 NOTE — ED NOTES
Patient given warm blanket. No acute signs of distress at this time. Family members at bedside. RN will continue to monitor.

## 2021-06-22 NOTE — ED NOTES
Bedside and Verbal shift change report given to Jose Juan Curtis RN (oncoming nurse) by Rita Tesfaye RN (offgoing nurse). Report included the following information SBAR, ED Summary, MAR and Recent Results.

## 2021-06-22 NOTE — PROGRESS NOTES
ED Consult for Senior Services:    Reason for Consult:  Failure to Thrive    This CM met with patient and daughter Harish Cramer [phone]. Patient was not able to speak at this point. Confirmed address of patient, insurance and PCP. Per daughter, patient sees Dr Tori Enriquez with [facility]. [phone]. Family has been transporting patient as well as working with [facility] transportation to get back and forth for medical appointments. Daughter Josiane Billings [phone] is also involved in providing support to patient. Patient has a hospital bed, walker and wheel chair and shower chair- only DME used by patient. Patient has personal care- 66 hours weekly-family member paid to provide care to patient. Patient is not currently on hospice and does not have any other [facility] services other then personal care. The agency that over sees personal care is [facility]. Per daughter, they have no concerns of paying for medications. CM will need to follow and address needs as they arise. Daughter indicated that patient has had decline over the last \"few days. \"      Juliana Winter  3:47 PM

## 2021-06-22 NOTE — Clinical Note
Status[de-identified] INPATIENT [101]   Type of Bed: Remote Telemetry [29]   Cardiac Monitoring Required?: No   Inpatient Hospitalization Certified Necessary for the Following Reasons: 3.  Patient receiving treatment that can only be provided in an inpatient setting (further clarification in H&P documentation)   Admitting Diagnosis: FTT (failure to thrive) in adult [6549910]   Admitting Physician: Eleazar Duncan [[id number]]   Attending Physician: Eleazar Duncan [[id number]]   Estimated Length of Stay: 3-4 Midnights   Discharge Plan[de-identified] Home with Office Follow-up

## 2021-06-22 NOTE — ED NOTES
RN to bedside to provide hygiene care to patient. Patient had small urine and stool occurrence. Patient cleaned, placed in clean brief and repositioned for comfort. RN will continue to monitor.

## 2021-06-22 NOTE — ED NOTES
Bedside and Verbal shift change report given to Shay Pierre RN (oncoming nurse) by Gianna Ann RN (offgoing nurse). Report included the following information SBAR, ED Summary, MAR and Recent Results.

## 2021-06-22 NOTE — ED TRIAGE NOTES
Patient arrives via EMS from home for dehydration. Per family patient has stopped taking her Seroquel and refusing to drink water. Patient was recently seen at [facility] and discharged. No acute distress noted in route. Patient non verbal at baseline.

## 2021-06-23 ENCOUNTER — APPOINTMENT (OUTPATIENT)
Dept: MRI IMAGING | Age: 86
DRG: 065 | End: 2021-06-23
Attending: STUDENT IN AN ORGANIZED HEALTH CARE EDUCATION/TRAINING PROGRAM
Payer: MEDICARE

## 2021-06-23 LAB
ANION GAP SERPL CALC-SCNC: 7 MMOL/L (ref 5–15)
ATRIAL RATE: 60 BPM
BACTERIA SPEC CULT: NORMAL
BUN SERPL-MCNC: 17 MG/DL (ref 6–20)
BUN/CREAT SERPL: 25 (ref 12–20)
CALCIUM SERPL-MCNC: 8.7 MG/DL (ref 8.5–10.1)
CALCULATED P AXIS, ECG09: 65 DEGREES
CALCULATED R AXIS, ECG10: 37 DEGREES
CALCULATED T AXIS, ECG11: 29 DEGREES
CHLORIDE SERPL-SCNC: 113 MMOL/L (ref 97–108)
CO2 SERPL-SCNC: 26 MMOL/L (ref 21–32)
CREAT SERPL-MCNC: 0.67 MG/DL (ref 0.55–1.02)
DIAGNOSIS, 93000: NORMAL
ERYTHROCYTE [DISTWIDTH] IN BLOOD BY AUTOMATED COUNT: 13 % (ref 11.5–14.5)
GLUCOSE SERPL-MCNC: 56 MG/DL (ref 65–100)
HCT VFR BLD AUTO: 41.9 % (ref 35–47)
HGB BLD-MCNC: 12.5 G/DL (ref 11.5–16)
MCH RBC QN AUTO: 27.8 PG (ref 26–34)
MCHC RBC AUTO-ENTMCNC: 29.8 G/DL (ref 30–36.5)
MCV RBC AUTO: 93.3 FL (ref 80–99)
NRBC # BLD: 0 K/UL (ref 0–0.01)
NRBC BLD-RTO: 0 PER 100 WBC
P-R INTERVAL, ECG05: 118 MS
PLATELET # BLD AUTO: 175 K/UL (ref 150–400)
PMV BLD AUTO: 12.3 FL (ref 8.9–12.9)
POTASSIUM SERPL-SCNC: 3.9 MMOL/L (ref 3.5–5.1)
Q-T INTERVAL, ECG07: 462 MS
QRS DURATION, ECG06: 54 MS
QTC CALCULATION (BEZET), ECG08: 462 MS
RBC # BLD AUTO: 4.49 M/UL (ref 3.8–5.2)
SERVICE CMNT-IMP: NORMAL
SODIUM SERPL-SCNC: 146 MMOL/L (ref 136–145)
T4 FREE SERPL-MCNC: 1.3 NG/DL (ref 0.8–1.5)
TSH SERPL DL<=0.05 MIU/L-ACNC: 0.19 UIU/ML (ref 0.36–3.74)
VENTRICULAR RATE, ECG03: 60 BPM
WBC # BLD AUTO: 4.9 K/UL (ref 3.6–11)

## 2021-06-23 PROCEDURE — 83036 HEMOGLOBIN GLYCOSYLATED A1C: CPT

## 2021-06-23 PROCEDURE — 99222 1ST HOSP IP/OBS MODERATE 55: CPT | Performed by: NURSE PRACTITIONER

## 2021-06-23 PROCEDURE — 80061 LIPID PANEL: CPT

## 2021-06-23 PROCEDURE — 74011000258 HC RX REV CODE- 258: Performed by: STUDENT IN AN ORGANIZED HEALTH CARE EDUCATION/TRAINING PROGRAM

## 2021-06-23 PROCEDURE — 74011250636 HC RX REV CODE- 250/636: Performed by: INTERNAL MEDICINE

## 2021-06-23 PROCEDURE — 74011250636 HC RX REV CODE- 250/636: Performed by: STUDENT IN AN ORGANIZED HEALTH CARE EDUCATION/TRAINING PROGRAM

## 2021-06-23 PROCEDURE — 80048 BASIC METABOLIC PNL TOTAL CA: CPT

## 2021-06-23 PROCEDURE — 36415 COLL VENOUS BLD VENIPUNCTURE: CPT

## 2021-06-23 PROCEDURE — 84443 ASSAY THYROID STIM HORMONE: CPT

## 2021-06-23 PROCEDURE — 65270000032 HC RM SEMIPRIVATE

## 2021-06-23 PROCEDURE — 85027 COMPLETE CBC AUTOMATED: CPT

## 2021-06-23 PROCEDURE — 92610 EVALUATE SWALLOWING FUNCTION: CPT

## 2021-06-23 PROCEDURE — 70551 MRI BRAIN STEM W/O DYE: CPT

## 2021-06-23 PROCEDURE — 84439 ASSAY OF FREE THYROXINE: CPT

## 2021-06-23 RX ORDER — SODIUM CHLORIDE 0.9 % (FLUSH) 0.9 %
5-40 SYRINGE (ML) INJECTION EVERY 8 HOURS
Status: DISCONTINUED | OUTPATIENT
Start: 2021-06-23 | End: 2021-06-28 | Stop reason: HOSPADM

## 2021-06-23 RX ORDER — SODIUM CHLORIDE 0.9 % (FLUSH) 0.9 %
5-40 SYRINGE (ML) INJECTION AS NEEDED
Status: DISCONTINUED | OUTPATIENT
Start: 2021-06-23 | End: 2021-06-28 | Stop reason: HOSPADM

## 2021-06-23 RX ORDER — ONDANSETRON 2 MG/ML
4 INJECTION INTRAMUSCULAR; INTRAVENOUS
Status: DISCONTINUED | OUTPATIENT
Start: 2021-06-23 | End: 2021-06-28 | Stop reason: HOSPADM

## 2021-06-23 RX ORDER — ACETAMINOPHEN 325 MG/1
650 TABLET ORAL
Status: DISCONTINUED | OUTPATIENT
Start: 2021-06-23 | End: 2021-06-28 | Stop reason: HOSPADM

## 2021-06-23 RX ORDER — POLYETHYLENE GLYCOL 3350 17 G/17G
17 POWDER, FOR SOLUTION ORAL DAILY PRN
Status: DISCONTINUED | OUTPATIENT
Start: 2021-06-23 | End: 2021-06-28 | Stop reason: HOSPADM

## 2021-06-23 RX ORDER — ONDANSETRON 4 MG/1
4 TABLET, ORALLY DISINTEGRATING ORAL
Status: DISCONTINUED | OUTPATIENT
Start: 2021-06-23 | End: 2021-06-28 | Stop reason: HOSPADM

## 2021-06-23 RX ORDER — ACETAMINOPHEN 650 MG/1
650 SUPPOSITORY RECTAL
Status: DISCONTINUED | OUTPATIENT
Start: 2021-06-23 | End: 2021-06-28 | Stop reason: HOSPADM

## 2021-06-23 RX ORDER — DEXTROSE MONOHYDRATE 50 MG/ML
50 INJECTION, SOLUTION INTRAVENOUS CONTINUOUS
Status: DISCONTINUED | OUTPATIENT
Start: 2021-06-23 | End: 2021-06-28 | Stop reason: HOSPADM

## 2021-06-23 RX ADMIN — CEFTRIAXONE SODIUM 1 G: 1 INJECTION, POWDER, FOR SOLUTION INTRAMUSCULAR; INTRAVENOUS at 21:26

## 2021-06-23 RX ADMIN — DEXTROSE MONOHYDRATE 50 ML/HR: 50 INJECTION, SOLUTION INTRAVENOUS at 11:03

## 2021-06-23 RX ADMIN — SODIUM CHLORIDE 125 ML/HR: 9 INJECTION, SOLUTION INTRAVENOUS at 11:10

## 2021-06-23 NOTE — PROGRESS NOTES
Radiology  MRI Head  IMPRESSION:   1. Acute left anterior mid marcela infarct. 2. Evidence of mild chronic small vessel ischemic change.

## 2021-06-23 NOTE — CONSULTS
Palliative Medicine Consult  Schulte: [phone] ([phone])    Patient Name: Ivonne Asencio  YOB: 1927    Date of Initial Consult: June 23, 2021  Reason for Consult: Care Decisions   Requesting Provider: Les Lund   Primary Care Physician: Saleem Allison MD     SUMMARY:   Ivonne Asencio is a 80 y.o. with a past history of dementia, HTN, who was admitted on 6/22/2021 from home with a diagnosis of FTT/progressive dementia, hypoglycemia, hypernatremia, low TSH, possible acute cystitis, rule out stroke  Family reported the pt have decrease appetite, sleeping all of the time, not drinking or eating. PMH: HTN, dementia, bedbound, anorexia,      Current medical issues leading to Palliative Medicine involvement include: support with care decisions given poor prognosis. Full Code  Social: lives at home with family and 24 hour care givers per chart notes     PALLIATIVE DIAGNOSES:   1. Anorexia  2. Goals of care  3. FTT  4. Physical debility      PLAN:   1. Pt seen without family. She is unable to provide any information due to her medical condition  2. Call placed to daughter and left a vm with my contact information  3. Will follow up  4. Initial consult note routed to primary continuity provider and/or primary health care team members  5. Communicated plan of care with: Palliative IDTShabana Team     GOALS OF CARE / TREATMENT PREFERENCES:     GOALS OF CARE:  Patient/Health Care Proxy Stated Goals: Prolong life    TREATMENT PREFERENCES:   Code Status: Full Code    Advance Care Planning:  [] [facility] Interdisciplinary Team has updated the ACP Navigator with [place] and Patient Capacity    . heal   dtr Vianey Finney [phone].   No AMD on file  Advance Care Planning 9/6/2018   Patient's Healthcare Decision Maker is: Unable to obtain   Primary Decision Maker Name Vianey Finney  no documented mpoa with multiple children   Primary Decision [employer] Number [phone]   Primary Decision Maker Relationship to Patient Adult child   Confirm Advance Directive None       Medical Interventions: Full interventions     Other Instructions: Other:    As far as possible, the palliative care team has discussed with patient / health care proxy about goals of care / treatment preferences for patient. HISTORY:     History obtained from: chart, team    CHIEF COMPLAINT: pt admitted with aforementioned history and issues    HPI/SUBJECTIVE:    The patient is:   [] Verbal and participatory  [x] Non-participatory due to:   Medical condition     Clinical Pain Assessment (nonverbal scale for severity on nonverbal patients):   Clinical Pain Assessment  Severity: 0     Activity (Movement): Lying quietly, normal position    Duration: for how long has pt been experiencing pain (e.g., 2 days, 1 month, years)  Frequency: how often pain is an issue (e.g., several times per day, once every few days, constant)     FUNCTIONAL ASSESSMENT:     Palliative Performance Scale (PPS):  PPS: 30       PSYCHOSOCIAL/SPIRITUAL SCREENING:     Palliative IDT has assessed this patient for cultural preferences / practices and a referral made as appropriate to needs (Cultural Services, Patient Advocacy, Ethics, etc.)    Any spiritual / Latter day concerns:  [] Yes /  [x] No    Caregiver Burnout:  [] Yes /  [x] No /  [] No Caregiver Present      Anticipatory grief assessment:   [x] Normal  / [] Maladaptive       ESAS Anxiety: Anxiety: 0    ESAS Depression:          REVIEW OF SYSTEMS:     Positive and pertinent negative findings in ROS are noted above in HPI. The following systems were [x] reviewed objectively / [] unable to be reviewed as noted in HPI  Other findings are noted below. Systems: constitutional, ears/nose/mouth/throat, respiratory, gastrointestinal, genitourinary, musculoskeletal, integumentary, neurologic, psychiatric, endocrine. Positive findings noted below.   Modified ESAS Completed by: provider   Fatigue: 10 Drowsiness: 7     Pain: 0   Anxiety: 0 Nausea: 0     Dyspnea: 0                    PHYSICAL EXAM:     From RN flowsheet:  Wt Readings from Last 3 Encounters:   09/04/18 123 lb 10.9 oz (56.1 kg)   03/29/18 122 lb 12.8 oz (55.7 kg)   01/01/18 107 lb (48.5 kg)     Blood pressure (!) 166/57, pulse 67, temperature 98 °F (36.7 °C), resp. rate 19, SpO2 100 %. Pain Scale 1: Adult Nonverbal Pain Scale  Pain Intensity 1: 0                 Last bowel movement, if known:     Constitutional: frail, somnolent  Eyes: closed  ENMT: no nasal discharge, moist mucous membranes  Cardiovascular:  distal pulses intact  Respiratory: breathing not labored, symmetric  Gastrointestinal: soft non-tender,   Musculoskeletal: no deformity, no tenderness to palpation  Skin: warm, dry  Neurologic:dementia  Psychiatric: neg agitation   Other:       HISTORY:     Active Problems:    FTT (failure to thrive) in adult (6/22/2021)      CVA (cerebral vascular accident) ([facility] (6/22/2021)      Past Medical History:   Diagnosis Date    Dementia ([facility]     Hypertension       History reviewed. No pertinent surgical history. History reviewed. No pertinent family history. History reviewed, no pertinent family history.   Social History     Tobacco Use    Smoking status: Never Smoker    Smokeless tobacco: Never Used   Substance Use Topics    Alcohol use: No     No Known Allergies   Current Facility-Administered Medications   Medication Dose Route Frequency    sodium chloride (NS) flush 5-40 mL  5-40 mL IntraVENous Q8H    sodium chloride (NS) flush 5-40 mL  5-40 mL IntraVENous PRN    acetaminophen (TYLENOL) tablet 650 mg  650 mg Oral Q6H PRN    Or    acetaminophen (TYLENOL) suppository 650 mg  650 mg Rectal Q6H PRN    polyethylene glycol (MIRALAX) packet 17 g  17 g Oral DAILY PRN    ondansetron (ZOFRAN ODT) tablet 4 mg  4 mg Oral Q8H PRN    Or    ondansetron (ZOFRAN) injection 4 mg  4 mg IntraVENous Q6H PRN    dextrose 5% infusion  50 mL/hr IntraVENous CONTINUOUS    cefTRIAXone (ROCEPHIN) 1 g in 0.9% sodium chloride (MBP/ADV) 50 mL MBP  1 g IntraVENous Q24H    0.9% sodium chloride infusion  125 mL/hr IntraVENous CONTINUOUS     Current Outpatient Medications   Medication Sig    ketoconazole (NIZORAL) 2 % shampoo PLEASE SEE ATTACHED FOR DETAILED DIRECTIONS    acetaminophen (TYLENOL ARTHRITIS PAIN) 650 mg TbER Take 650 mg by mouth daily.  QUEtiapine (SEROQUEL) 25 mg tablet Take 25 mg by mouth nightly.  amoxicillin-clavulanate (AUGMENTIN) 500-125 mg per tablet Take 1 Tab by mouth every eight (8) hours. (Patient not taking: Reported on 6/22/2021)          LAB AND IMAGING FINDINGS:     Lab Results   Component Value Date/Time    WBC 4.9 06/23/2021 07:04 AM    HGB 12.5 06/23/2021 07:04 AM    PLATELET 148 11/48/4492 07:04 AM     Lab Results   Component Value Date/Time    Sodium 146 (H) 06/23/2021 07:04 AM    Potassium 3.9 06/23/2021 07:04 AM    Chloride 113 (H) 06/23/2021 07:04 AM    CO2 26 06/23/2021 07:04 AM    BUN 17 06/23/2021 07:04 AM    Creatinine 0.67 06/23/2021 07:04 AM    Calcium 8.7 06/23/2021 07:04 AM    Magnesium 2.0 09/07/2018 09:00 AM      Lab Results   Component Value Date/Time    Alk. phosphatase 102 06/22/2021 02:10 PM    Protein, total 7.9 06/22/2021 02:10 PM    Albumin 3.6 06/22/2021 02:10 PM    Globulin 4.3 (H) 06/22/2021 02:10 PM     No results found for: INR, PTMR, PTP, PT1, PT2, APTT, INREXT, INREXT   No results found for: IRON, FE, TIBC, IBCT, PSAT, FERR   No results found for: PH, PCO2, PO2  No components found for: [place]   Lab Results   Component Value Date/Time     (H) 09/05/2018 04:26 AM    CK - MB 1.9 09/03/2018 08:25 PM                Total time: 50 min  Counseling / coordination time, spent as noted above:  40 min  > 50% counseling / coordination?: y    Prolonged service was provided for  []30 min   []75 min in face to face time in the presence of the patient, spent as noted above.   Time Start: Time End:   Note: this can only be billed with 55407 (initial) or 68165 (follow up). If multiple start / stop times, list each separately.

## 2021-06-23 NOTE — PROGRESS NOTES
Spiritual Care Assessment/Progress Note  [facility]      NAME: Aziza Clarke      MRN: [id number]  AGE: 80 y.o. SEX: female  Sikhism Affiliation: Zoroastrianism   Language: English     6/23/2021     Total Time (in minutes): 27     Spiritual Assessment begun in [facility] through conversation with:         []Patient        [x] Family    [] Friend(s)        Reason for Consult: Palliative Care, Initial/Spiritual Assessment     Spiritual beliefs: (Please include comment if needed)     [x] Identifies with a sebastien tradition: Zoroastrianism       [] Supported by a sebastien community:            [] Claims no spiritual orientation:           [] Seeking spiritual identity:                [] Adheres to an individual form of spirituality:           [] Not able to assess:                           Identified resources for coping:      [] Prayer                               [] Music                  [] Guided Imagery     [x] Family/friends                 [] Pet visits     [] Devotional reading                         [] Unknown     [] Other:                                               Interventions offered during this visit: (See comments for more details)          Family/Friend(s):  Affirmation of emotions/emotional suffering, Affirmation of sebastien, Iconic (affirming the presence of God/Higher Power), Normalization of emotional/spiritual concerns, Prayer (assurance of)     Plan of Care:     [] Support spiritual and/or cultural needs    [] Support AMD and/or advance care planning process      [] Support grieving process   [] Coordinate Rites and/or Rituals    [] Coordination with community clergy   [] No spiritual needs identified at this time   [] Detailed Plan of Care below (See Comments)  [] Make referral to Music Therapy  [] Make referral to Pet Therapy     [] Make referral to Addiction services  [] Make referral to [facility]  [] Make referral to Spiritual Care Partner  [] No future visits requested        [x] Follow up visits as needed     Visited pt for initial spiritual assessment. Pt was sleeping for the duration of visit. Pt's daughter Lisa Chan and a grandson at bedside. The pt has a large family with children and grand children. Listened as Lisa Chan spoke of the pt's life and her concern for her at this period of life.     Chaplain Fuller MDiv, MS, [address] [employer]  [facility] PRAY ([id number])

## 2021-06-23 NOTE — ED NOTES
The documentation for this period is being entered following the guidelines as defined in the [facility] policy by Jonna Jeffries RN.

## 2021-06-23 NOTE — ED NOTES
Bedside shift change report given to [address] [place] [address] (oncoming nurse) by Elvira Anthony (offgoing nurse). Report included the following information SBAR, ED Summary and Recent Results.

## 2021-06-23 NOTE — ACP (ADVANCE CARE PLANNING)
[address] [facility] Adult  Hospitalist Group                                      Advance Care Planning Note    Name: Katy Mann  YOB: 1927  MRN: [id number]  Admission Date: 6/22/2021  1:52 PM    Date of discussion: 6/23/2021    Active Diagnoses:    Hospital Problems  Date Reviewed: 9/4/2018        Codes Class Noted POA    FTT (failure to thrive) in adult ICD-10-CM: R62.7  ICD-9-CM: 783.7  6/22/2021 Unknown        CVA (cerebral vascular accident) [facility]) ICD-10-CM: I63.9  ICD-9-CM: 434.91  6/22/2021 Unknown              These active diagnoses are of sufficient risk that focused discussion on advance care planning is indicated in order to allow the patient to thoughtfully consider personal goals of care, and if situations arise that prevent the ability to personally give input, to ensure appropriate representation of their personal desires for different levels and aggressiveness of care. Discussion:     Persons present and participating in discussion: Katyrenetta MannRosales MD, João Duque: cell: [phone]  Velia Dex: [phone]    Topics Discussed:  Patient's medical condition and diagnosis: [x  ] yes [  ] no   Surrogate decision maker: [x  ] yes [  ] no   Patient's current physical function/cognitive function/frailty: [  x] yes [  ] no   Code Status: [ x ] yes [  ] no   Artificial Nutrition / Dialysis / Non-Invasive Ventilation / Blood Transfusion: [x  ] yes [  ] no  Potential Resources for home (durable medical equipment, home nursing, home O2): [ x ] yes [  ] no    Overview of Discussion:   I had family meeting with patient's daughters Velia Kowalski and João Duque as well as grandson and brother who are at bedside. We discussed her presentation in great length and current plan of treatment. We discussed her chronic conditions include dementia and potential worsening of dementia.   Explained to family that patient's current presentation may be a progression of her dementia and discussed possibility of hospice and end-of-life care. Family reiterated patient had previously been on hospice. Patient's daughter states she understands the mother's advancing age and approaching the end of her life but she is still unwilling to let go. We discussed her CODE STATUS in great length and I explained the process of CPR, electric shock and intubation in great detail. We further discussed survivability of such procedure will nourished and debilitated patient. Although family expresses understanding for need of this conversation, continue to requested patient remain full code. Time Spent:     Total time spent face-to-face in education and discussion: 20 minutes.      Amadou Lucia MD  Date of Service:  6/23/2021  6:06 AM

## 2021-06-23 NOTE — PROGRESS NOTES
Speech pathology  Initiated session by providing brief oral care at least to lips (patient pursing lips closed); however, transport arrived to take patient to MRI. Several family members at bedside reporting she had no difficulty swallowing until recently. They report that now she just holds everything in her mouth. Will follow up with patient later today.   Lucien Serrano M.S. CCC-SLP

## 2021-06-23 NOTE — PROGRESS NOTES
TRANSFER - IN REPORT:    Verbal report received from [address] (name) on Shannan Curtis  being received from ED (unit) for routine progression of care      Report consisted of patients Situation, Background, Assessment and   Recommendations(SBAR). Information from the following report(s) SBAR, [facility], [facility] and Recent Results was reviewed with the receiving nurse. Opportunity for questions and clarification was provided. Assessment completed upon patients arrival to unit and care assumed.

## 2021-06-23 NOTE — PROGRESS NOTES
[address] [facility] Adult  Hospitalist Group                                                                                          Hospitalist Progress Note  [address] [facility],   Answering service: [phone] from in house phone        Date of Service:  2021  NAME:  Liyah Smith  :  1927  MRN:  [id number]      Admission Summary:   80 y.o. female w/ pmh of htn and dementia who is brought to ER by family for increasing debility and refusal to eat or drink. History is provided by family at bedside as patient is unable to do so given her advanced dementia. Family states patient had been in her baseline health until about 3-1/2 days ago when they noticed a progressive decline in her mentation as well as p.o. tolerance. 24 hours prior to ER presentation, family state that they noted her right facial droop she was seen at [facility] 2 days ago and was discharged back to home. Additionally, family states patient's PCP was consulted and prescribed an antibiotic for presumed UTI which was never started.       Interval history / Subjective: Follow up FTT. Patient seen and examined in the ED. Son at bedside. Patient lethargic but responds to voice. She appears to have right sided facial droop. Neuro exam is limited due to cognition.       Assessment & Plan:     FTT / Progressive Dementia  -presenting with cognitive decline, poor oral intake, right facial droop   -Obtain MRI brain  -SLP, case management consult.   -Unable to participate in PT/OT at this time- has full time care givers at home  -Keep n.p.o. until speech eval  -Palliative care consult    Hypoglycemia:   Hypernatremia:   -suspect due to decreased oral intake  -Start D5W  -accuchecks every 6 hours    Low TSH:   -check T4     Hypertension  -As needed hydralazine     Dementia  -Seroquel pending speech evaluation     Possible Acute cystitis  -Urinalysis equivocal   -continue Rocephin 1 g daily pending cultures -Follow blood and urine cultures     Intravascular volume depletion  -Continue MIVF     Code status: Full. Palliative care consulted   DVT prophylaxis: SCDs    Care Plan discussed with: Patient/Family  Anticipated Disposition: Home w/Family  Anticipated Discharge: 24 hours to 48 hours     Hospital Problems  Date Reviewed: 9/4/2018        Codes Class Noted POA    FTT (failure to thrive) in adult ICD-10-CM: R62.7  ICD-9-CM: 783.7  6/22/2021 Unknown        CVA (cerebral vascular accident) [facility]) ICD-10-CM: I63.9  ICD-9-CM: 434.91  6/22/2021 Unknown                Review of Systems:   Unable to obtain       Vital Signs:    Last 24hrs VS reviewed since prior progress note. Most recent are:  Visit Vitals  BP (!) 166/57   Pulse 67   Temp 98 °F (36.7 °C)   Resp 19   SpO2 100%       No intake or output data in the 24 hours ending 06/23/21 1042     Physical Examination:     I had a face to face encounter with this patient and independently examined them on 6/23/2021 as outlined below:          Constitutional:  No acute distress, elderly, responds to voice    ENT:  Oral mucosa moist, oropharynx benign. Resp:  CTA bilaterally. No wheezing/rhonchi/rales. No accessory muscle use   CV:  bradycardia, no murmurs, gallops, rubs    GI:  Soft, non distended, non tender.  normoactive bowel sounds, no hepatosplenomegaly     Musculoskeletal:  No edema, warm, 2+ pulses throughout    Neurologic:  right sided facial droop, unable to follow commands, right upper extremity appears more relaxed compared to left- patient unable to participate in exam             Data Review:    Review and/or order of clinical lab test  Review and/or order of tests in the radiology section of CPT  Review and/or order of tests in the medicine section of CPT      Labs:     Recent Labs     06/23/21  0704 06/22/21  1410   WBC 4.9 5.6   HGB 12.5 13.3   HCT 41.9 44.9    220     Recent Labs     06/23/21  0704 06/22/21  1410   * 143   K 3.9 4.2   * 109*   CO2 26 27   BUN 17 18   CREA 0.67 1.02   GLU 56* 75   CA 8.7 9.4     Recent Labs     06/22/21  1410   ALT 26      TBILI 0.6   TP 7.9   ALB 3.6   GLOB 4.3*     No results for input(s): INR, PTP, APTT, INREXT in the last 72 hours. No results for input(s): FE, TIBC, PSAT, FERR in the last 72 hours. No results found for: FOL, RBCF   No results for input(s): PH, PCO2, PO2 in the last 72 hours.   Recent Labs     06/22/21  1410   TROIQ <0.05     No results found for: CHOL, CHOLX, CHLST, CHOLV, HDL, HDLP, LDL, LDLC, DLDLP, TGLX, TRIGL, TRIGP, CHHD, CHHDX  No results found for: [facility]  Lab Results   Component Value Date/Time    Color DARK YELLOW 06/22/2021 03:03 PM    Appearance CLOUDY (A) 06/22/2021 03:03 PM    Specific gravity 1.023 06/22/2021 03:03 PM    Specific gravity 1.025 09/03/2018 11:36 PM    pH (UA) 5.0 06/22/2021 03:03 PM    Protein 30 (A) 06/22/2021 03:03 PM    Glucose Negative 06/22/2021 03:03 PM    Ketone 15 (A) 06/22/2021 03:03 PM    Bilirubin NEGATIVE  09/03/2018 11:36 PM    Urobilinogen 1.0 06/22/2021 03:03 PM    Nitrites Negative 06/22/2021 03:03 PM    Leukocyte Esterase TRACE (A) 06/22/2021 03:03 PM    Epithelial cells FEW 06/22/2021 03:03 PM    Bacteria 3+ (A) 06/22/2021 03:03 PM    WBC 5-10 06/22/2021 03:03 PM    RBC 0-5 06/22/2021 03:03 PM         Medications Reviewed:     Current Facility-Administered Medications   Medication Dose Route Frequency    sodium chloride (NS) flush 5-40 mL  5-40 mL IntraVENous Q8H    sodium chloride (NS) flush 5-40 mL  5-40 mL IntraVENous PRN    acetaminophen (TYLENOL) tablet 650 mg  650 mg Oral Q6H PRN    Or    acetaminophen (TYLENOL) suppository 650 mg  650 mg Rectal Q6H PRN    polyethylene glycol (MIRALAX) packet 17 g  17 g Oral DAILY PRN    ondansetron (ZOFRAN ODT) tablet 4 mg  4 mg Oral Q8H PRN    Or    ondansetron (ZOFRAN) injection 4 mg  4 mg IntraVENous Q6H PRN    cefTRIAXone (ROCEPHIN) 1 g in 0.9% sodium chloride (MBP/ADV) 50 mL MBP  1 g IntraVENous Q24H    0.9% sodium chloride infusion  125 mL/hr IntraVENous CONTINUOUS     Current Outpatient Medications   Medication Sig    ketoconazole (NIZORAL) 2 % shampoo PLEASE SEE ATTACHED FOR DETAILED DIRECTIONS    acetaminophen (TYLENOL ARTHRITIS PAIN) 650 mg TbER Take 650 mg by mouth daily.  QUEtiapine (SEROQUEL) 25 mg tablet Take 25 mg by mouth nightly.  amoxicillin-clavulanate (AUGMENTIN) 500-125 mg per tablet Take 1 Tab by mouth every eight (8) hours.  (Patient not taking: Reported on 6/22/2021)     ______________________________________________________________________  EXPECTED LENGTH OF STAY: - - -  ACTUAL LENGTH OF STAY:          [facility],

## 2021-06-23 NOTE — PROGRESS NOTES
Problem: Dysphagia (Adult)  Goal: *Acute Goals and Plan of Care (Insert Text)  Description: Speech path goals initiated 6/23/2021   1. Patient will participate in swallow re-eval  Outcome: Not Met   [address] EVALUATION  Patient: Ivonne Asencio (89 y.o. female)  Date: 6/23/2021  Primary Diagnosis: FTT (failure to thrive) in adult [R62.7]  CVA (cerebral vascular accident) ([facility] [address] [address] [I63.9]        Precautions:aspiration       ASSESSMENT :  Based on the objective data described below, the patient presents with at least mod-severe oral and mod pharyngeal dysphagia; though limited assessment given refusal and oral holding. Patient accepted ice chip, bite of applesauce and sip of thin. She bit the ice chip once and allowed it to melt but did not initiate a swallow. Applesauce presented and patient with prolonged oral holding despite numerous verbal cues, tongue base massage, and empty spoon presentation. She eventually swallowed but difficult to determine if full oral clearance given no command following to open mouth even with tactile cueing. She took single sip of water, briefly held in mouth before swallowing. Suspect delayed initiation and reduced hyolaryngeal movement. 2-3 swallows noted with water. No overt s/s aspiration with any consistency;  however, risk of aspiration is extremely high given advanced age, area of infarct in marcela, and advanced dementia. Family with many questions. Informed that we have several factors to take into consideration (age, advanced dementia, CVA with area of infarct likely to impact swallowing, and lethargy. Educated on importance of keeping npo at this time. Family reiterated recommendation. Patient will benefit from skilled intervention to address the above impairments.   Patients rehabilitation potential is considered to be Poor - Hospice     PLAN :  Recommendations and Planned Interventions:  -- NPO  -- slp to re-assess; though given many risk factors, high risk for aspiration  -- agree with further discussion regarding goals. Patient is a poor peg candidate given advanced dementia at baseline. Frequency/Duration: Patient will be followed by speech-language pathology 3 times a week to address goals. Discharge Recommendations: To Be Determined     SUBJECTIVE:   Patient with several family members a bedside. OBJECTIVE:     Past Medical History:   Diagnosis Date    Dementia ([facility]     Hypertension    History reviewed. No pertinent surgical history. Prior Level of Function/Home Situation:      Diet prior to admission: reg/thin  Current Diet:  npo   Cognitive and Communication Status:  Neurologic State: Drowsy, Confused  Orientation Level: Unable to verbalize  Cognition: No command following     Perseveration: No perseveration noted     Oral Assessment:  Oral Assessment  Labial: Decreased rate;Decreased seal  Dentition: Edentulous  Oral Hygiene: poor visualization  Lingual:  (no command following)  Velum: Unable to visualize  P.O. Trials:  Patient Position:  (up in bed)  Vocal quality prior to P.O.: Low volume (brief vocalization heard)  Consistency Presented: Thin liquid;Puree; Ice chips  How Presented: SLP-fed/presented;Spoon;Straw     Bolus Acceptance: Impaired  Bolus Formation/Control: Impaired  Type of Impairment: Delayed  Propulsion: Delayed (# of seconds) (oral holding)     Initiation of Swallow: Delayed (# of seconds)  Laryngeal Elevation: Decreased;Weak  Aspiration Signs/Symptoms: None  Pharyngeal Phase Characteristics: Double swallow             Oral Phase Severity: Moderate-severe  Pharyngeal Phase Severity : Moderate    NOMS:   The NOMS functional outcome measure was used to quantify this patient's level of swallowing impairment.   Based on the NOMS, the patient was determined to be at level 1 for swallow function       NOMS Swallowing Levels:  Level 1 (CN): NPO  Level 2 (CM): NPO but takes consistency in therapy  Level 3 (CL): Takes less than 50% of nutrition p.o. and continues with nonoral feedings; and/or safe with mod cues; and/or max diet restriction  Level 4 (CK): Safe swallow but needs mod cues; and/or mod diet restriction; and/or still requires some nonoral feeding/supplements  Level 5 (CJ): Safe swallow with min diet restriction; and/or needs min cues  Level 6 (CI): Independent with p.o.; rare cues; usually self cues; may need to avoid some foods or needs extra time  Level 7 ([address] [place] [address]): Independent for all p.o.  MARY. (2003). National Outcomes Measurement System (NOMS): Adult Speech-Language Pathology User's Guide. Pain:  Pain Scale 1: Adult Nonverbal Pain Scale          After treatment:   Patient left in no apparent distress in bed, Call bell within reach, Nursing notified, and Caregiver / family present    COMMUNICATION/EDUCATION:   Patient was educated regarding her deficit(s) of dysphagia as this relates to her diagnosis of cva. She demonstrated Poor -understanding. The patient's plan of care including recommendations, planned interventions, and recommended diet changes were discussed with: Registered nurse. Patient is unable to participate in goal setting and plan of care.     Thank you for this referral.  Laury Landeros, SLP  Time Calculation: 26 mins

## 2021-06-23 NOTE — PROGRESS NOTES
Physical Therapy 6/23/2021    Orders received and chart reviewed up to date. Pt with h/o advanced dementia, wheelchair bound, requires assistance with ADLs and IADls, and has personal care aids. Per attending, pt not appropriate for therapy. Recommend resume care of care aides, will complete orders as no acute PT needs. Thank you.   Yuly Wing, PT, DPT

## 2021-06-23 NOTE — PROGRESS NOTES
Occupational Therapy   6/23/2021     Orders received and chart reviewed up to date. Pt with h/o advanced dementia, wheelchair bound, requires assistance with ADLs and IADls, and has personal care aids. Per PT, pt not appropriate for therapy. Recommend resume care of care aides, will complete orders as no acute OT needs. Thank you. BrendanNewcomb CHELA Sotelo MS, OTR/L

## 2021-06-24 ENCOUNTER — APPOINTMENT (OUTPATIENT)
Dept: NON INVASIVE DIAGNOSTICS | Age: 86
DRG: 065 | End: 2021-06-24
Attending: PSYCHIATRY & NEUROLOGY
Payer: MEDICARE

## 2021-06-24 LAB
CHOLEST SERPL-MCNC: 175 MG/DL
EST. AVERAGE GLUCOSE BLD GHB EST-MCNC: 91 MG/DL
GLUCOSE BLD STRIP.AUTO-MCNC: 83 MG/DL (ref 65–117)
GLUCOSE BLD STRIP.AUTO-MCNC: 87 MG/DL (ref 65–117)
GLUCOSE BLD STRIP.AUTO-MCNC: 96 MG/DL (ref 65–117)
HBA1C MFR BLD: 4.8 % (ref 4–5.6)
HDLC SERPL-MCNC: 40 MG/DL
HDLC SERPL: 4.4 {RATIO} (ref 0–5)
LDLC SERPL CALC-MCNC: 115.2 MG/DL (ref 0–100)
SERVICE CMNT-IMP: NORMAL
TRIGL SERPL-MCNC: 99 MG/DL (ref ?–150)
VLDLC SERPL CALC-MCNC: 19.8 MG/DL

## 2021-06-24 PROCEDURE — 74011000258 HC RX REV CODE- 258: Performed by: STUDENT IN AN ORGANIZED HEALTH CARE EDUCATION/TRAINING PROGRAM

## 2021-06-24 PROCEDURE — 65270000032 HC RM SEMIPRIVATE

## 2021-06-24 PROCEDURE — 92526 ORAL FUNCTION THERAPY: CPT | Performed by: SPEECH-LANGUAGE PATHOLOGIST

## 2021-06-24 PROCEDURE — 74011250636 HC RX REV CODE- 250/636: Performed by: STUDENT IN AN ORGANIZED HEALTH CARE EDUCATION/TRAINING PROGRAM

## 2021-06-24 PROCEDURE — 82962 GLUCOSE BLOOD TEST: CPT

## 2021-06-24 PROCEDURE — 99223 1ST HOSP IP/OBS HIGH 75: CPT | Performed by: PSYCHIATRY & NEUROLOGY

## 2021-06-24 PROCEDURE — 93306 TTE W/DOPPLER COMPLETE: CPT

## 2021-06-24 PROCEDURE — 74011250637 HC RX REV CODE- 250/637: Performed by: PSYCHIATRY & NEUROLOGY

## 2021-06-24 PROCEDURE — 99233 SBSQ HOSP IP/OBS HIGH 50: CPT | Performed by: NURSE PRACTITIONER

## 2021-06-24 RX ORDER — ASPIRIN 300 MG/1
300 SUPPOSITORY RECTAL DAILY
Status: DISCONTINUED | OUTPATIENT
Start: 2021-06-24 | End: 2021-06-26

## 2021-06-24 RX ADMIN — Medication 10 ML: at 21:19

## 2021-06-24 RX ADMIN — ASPIRIN 300 MG: 300 SUPPOSITORY RECTAL at 12:02

## 2021-06-24 RX ADMIN — CEFTRIAXONE SODIUM 1 G: 1 INJECTION, POWDER, FOR SOLUTION INTRAMUSCULAR; INTRAVENOUS at 19:29

## 2021-06-24 RX ADMIN — Medication 10 ML: at 09:42

## 2021-06-24 NOTE — PROGRESS NOTES
Patient resting in bed, minimal responsiveness, opens eyes to stimuli, unable to tolerate oral meds/food/liquid. Problem: Patient Education: Go to Patient Education Activity  Goal: Patient/Family Education  Outcome: Progressing Towards Goal     Problem: Falls - Risk of  Goal: *Absence of Falls  Description: Document Lawrence Katz Fall Risk and appropriate interventions in the flowsheet. Outcome: Progressing Towards Goal  Note: Fall Risk Interventions:  Mobility Interventions: Communicate number of staff needed for ambulation/transfer, Bed/chair exit alarm    Mentation Interventions: Door open when patient unattended, Bed/chair exit alarm, Adequate sleep, hydration, pain control         Elimination Interventions:  Toileting schedule/hourly rounds, Bed/chair exit alarm, Call light in reach              Problem: Patient Education: Go to Patient Education Activity  Goal: Patient/Family Education  Outcome: Progressing Towards Goal

## 2021-06-24 NOTE — PROGRESS NOTES
[facility] Adult  Hospitalist Group                                                                                          Hospitalist Progress Note  Ashlee Solis MD  Answering service: [phone] from in house phone        Date of Service:  2021  NAME:  Bebeto Wolff  :  1927  MRN:  [id number]      Admission Summary:   80 y.o. female w/ pmh of htn and dementia who is brought to ER by family for increasing debility and refusal to eat or drink. History is provided by family at bedside as patient is unable to do so given her advanced dementia. Family states patient had been in her baseline health until about 3-1/2 days ago when they noticed a progressive decline in her mentation as well as p.o. tolerance. 24 hours prior to ER presentation, family state that they noted her right facial droop she was seen at [place] 2 days ago and was discharged back to home. Additionally, family states patient's PCP was consulted and prescribed an antibiotic for presumed UTI which was never started.       Interval history / Subjective:   Patient seen and examined discussed with patient son and daughter at bedside as per MRI patient has acute CVA seen by neurologist patient failed speech and swallow  Family wants everything to be done requested GI consult for PEG tube and palliative consult as well     Assessment & Plan:     FTT / Progressive Dementia  -presenting with cognitive decline, poor oral intake, right facial droop   -MRI  - 1. Acute left anterior mid marcela infarct. 2. Evidence of mild chronic small vessel ischemic change.   -SLP, case management consult.   -Unable to participate in PT/OT at this time- has full time care givers at home  -Keep n.p.o. until speech eval once able to swallow can start aspirin and statin  -Palliative care consult    Hypoglycemia:   Hypernatremia:   -suspect due to decreased oral intake  -Start D5W  -accuchecks every 6 hours    Low TSH:   -check T4 within normal limit     Hypertension  -As needed hydralazine     Dementia  -Seroquel pending speech evaluation     Possible Acute cystitis  -Urinalysis equivocal   -continue Rocephin 1 g daily pending cultures   -Follow blood and urine cultures     Intravascular volume depletion  -Continue MIVF     Code status: Full. Palliative care consulted   DVT prophylaxis: SCDs    Care Plan discussed with: Patient/Family  Anticipated Disposition: Home w/Family  Anticipated Discharge: Need PEG tube need home care set up discussed with CM discussed with RN and family at bedside as well spoke to the primary care physician at [facility] and they are in close contact with the patient family and discussing goals of care     Hospital Problems  Date Reviewed: 9/4/2018        Codes Class Noted POA    FTT (failure to thrive) in adult ICD-10-CM: R62.7  ICD-9-CM: 783.7  6/22/2021 Unknown        CVA (cerebral vascular accident) [facility]) ICD-10-CM: I63.9  ICD-9-CM: 434.91  6/22/2021 Unknown                Review of Systems:   Unable to obtain     MRI BRAIN WO CONT    Result Date: 6/23/2021  1. Acute left anterior mid marcela infarct. 2. Evidence of mild chronic small vessel ischemic change. CT HEAD WO CONT    Result Date: 6/22/2021  No acute findings. CTA HEAD    Result Date: 6/22/2021  No significant lesion demonstrated. CTA NECK    Result Date: 6/22/2021  No significant lesion demonstrated. XR CHEST PORT    Result Date: 6/22/2021  No acute changes. Vital Signs:    Last 24hrs VS reviewed since prior progress note.  Most recent are:  Visit Vitals  BP (!) 163/69 (BP 1 Location: Right upper arm, BP Patient Position: At rest)   Pulse (!) 56   Temp 97.5 °F (36.4 °C)   Resp 16   Ht 5' (1.524 m)   Wt 61.3 kg (135 lb 2.3 oz)   SpO2 97%   BMI 26.39 kg/m²         Intake/Output Summary (Last 24 hours) at 6/24/2021 1201  Last data filed at 6/24/2021 1017  Gross per 24 hour   Intake 0 ml   Output 0 ml   Net 0 ml Physical Examination:     I had a face to face encounter with this patient and independently examined them on 6/24/2021 as outlined below:          Constitutional:  No acute distress, elderly, poor functional state does not respond very well   ENT:  Mucous membrane moist   Resp:  CTA bilaterally. CV:  bradycardia, no murmurs, gallops, rubs    GI:  Soft, non distended, non tender. normoactive bowel sounds, no hepatosplenomegaly     Musculoskeletal:  No edema, warm, 2+ pulses throughout    Neurologic:  right sided facial droop, unable to follow commands, right upper extremity appears more relaxed compared to left- patient unable to participate in exam             Data Review:    Review and/or order of clinical lab test  Review and/or order of tests in the radiology section of CPT  Review and/or order of tests in the medicine section of CPT      Labs:     Recent Labs     06/23/21  0704 06/22/21  1410   WBC 4.9 5.6   HGB 12.5 13.3   HCT 41.9 44.9    220     Recent Labs     06/23/21  0704 06/22/21  1410   * 143   K 3.9 4.2   * 109*   CO2 26 27   BUN 17 18   CREA 0.67 1.02   GLU 56* 75   CA 8.7 9.4     Recent Labs     06/22/21  1410   ALT 26      TBILI 0.6   TP 7.9   ALB 3.6   GLOB 4.3*     No results for input(s): INR, PTP, APTT, INREXT, INREXT in the last 72 hours. No results for input(s): FE, TIBC, PSAT, FERR in the last 72 hours. No results found for: FOL, RBCF   No results for input(s): PH, PCO2, PO2 in the last 72 hours.   Recent Labs     06/22/21  1410   TROIQ <0.05     No results found for: CHOL, CHOLX, CHLST, CHOLV, HDL, HDLP, LDL, LDLC, DLDLP, TGLX, TRIGL, TRIGP, CHHD, CHHDX  Lab Results   Component Value Date/Time    Glucose (POC) 87 06/24/2021 06:06 AM    Glucose (POC) 83 06/24/2021 12:30 AM     Lab Results   Component Value Date/Time    Color DARK YELLOW 06/22/2021 03:03 PM    Appearance CLOUDY (A) 06/22/2021 03:03 PM    Specific gravity 1.023 06/22/2021 03:03 PM    Specific gravity 1.025 09/03/2018 11:36 PM    pH (UA) 5.0 06/22/2021 03:03 PM    Protein 30 (A) 06/22/2021 03:03 PM    Glucose Negative 06/22/2021 03:03 PM    Ketone 15 (A) 06/22/2021 03:03 PM    Bilirubin NEGATIVE  09/03/2018 11:36 PM    Urobilinogen 1.0 06/22/2021 03:03 PM    Nitrites Negative 06/22/2021 03:03 PM    Leukocyte Esterase TRACE (A) 06/22/2021 03:03 PM    Epithelial cells FEW 06/22/2021 03:03 PM    Bacteria 3+ (A) 06/22/2021 03:03 PM    WBC 5-10 06/22/2021 03:03 PM    RBC 0-5 06/22/2021 03:03 PM         Medications Reviewed:     Current Facility-Administered Medications   Medication Dose Route Frequency    aspirin (ASA) suppository 300 mg  300 mg Rectal DAILY    sodium chloride (NS) flush 5-40 mL  5-40 mL IntraVENous Q8H    sodium chloride (NS) flush 5-40 mL  5-40 mL IntraVENous PRN    acetaminophen (TYLENOL) tablet 650 mg  650 mg Oral Q6H PRN    Or    acetaminophen (TYLENOL) suppository 650 mg  650 mg Rectal Q6H PRN    polyethylene glycol (MIRALAX) packet 17 g  17 g Oral DAILY PRN    ondansetron (ZOFRAN ODT) tablet 4 mg  4 mg Oral Q8H PRN    Or    ondansetron (ZOFRAN) injection 4 mg  4 mg IntraVENous Q6H PRN    dextrose 5% infusion  50 mL/hr IntraVENous CONTINUOUS    cefTRIAXone (ROCEPHIN) 1 g in 0.9% sodium chloride (MBP/ADV) 50 mL MBP  1 g IntraVENous Q24H     ______________________________________________________________________  EXPECTED LENGTH OF STAY: - - -  ACTUAL LENGTH OF STAY:          2                 Ny Blanco MD

## 2021-06-24 NOTE — CONSULTS
[address]  [address] [place], [address]  [phone]                     GI CONSULTATION NOTE      NAME:  Marcos Alexander   :   1927   MRN:   [id number]     Consult Date: 2021     Chief Complaint: PEG Placement    History of Present Illness:  Patient is a 80 y.o. who is seen in consultation at the request of Dr. Alyssa Chan for above mentioned problem. Ms. Margarita Weiss has a past medical history significant for dementia and HTN. She is wheelchair bound at home and was found to be in her usual state of health until 3 days PTA, they noticed a decline in her mentation and oral intake. Family noticed a right facial droop and presented to the ED. She  +UTI but initial Chest x-ray, CT head, CTA head and neck showed no acute process. MRI Brain on  revealed acute left anterior mid marcela infarct. She has been working with SLP. she is at risk for silent aspiration. Per SLP note,  \"She demonstrated delayed bolus acceptance from straw with decreased labial seal and suck. Decreased stripping of spoon. Prolonged oral holding for each bite with some anterior loss from L. There was an eventual swallow, with throat clear noted though not consistently. She is at increased risk for silent aspiration given the location of her CVA\"  We were consulted for evaluation for PEG placement. PMH:  Past Medical History:   Diagnosis Date    Dementia ([facility] [address]     As of 2021, pt has h/o dementia for approx 8 years, she is verbal and feeds herself at home, she is in a [facility] and needs assistance with ADLs o/w.  Hypertension        PSH:  History reviewed. No pertinent surgical history. Allergies:  No Known Allergies    Home Medications:  Prior to Admission Medications   Prescriptions Last Dose Informant Patient Reported? Taking? QUEtiapine (SEROQUEL) 25 mg tablet 2021 at 1900  Yes Yes   Sig: Take 25 mg by mouth nightly.    acetaminophen (TYLENOL ARTHRITIS PAIN) 650 mg TbER 6/15/2021 at Unknown time  Yes Yes   Sig: Take 650 mg by mouth daily. amoxicillin-clavulanate (AUGMENTIN) 500-125 mg per tablet Not Taking at Unknown time  No No   Sig: Take 1 Tab by mouth every eight (8) hours. Patient not taking: Reported on 6/22/2021   ketoconazole (NIZORAL) 2 % shampoo 6/15/2021 at Unknown time  Yes Yes   Sig: PLEASE SEE ATTACHED FOR DETAILED DIRECTIONS      Facility-Administered Medications: None       Hospital Medications:  Current Facility-Administered Medications   Medication Dose Route Frequency    aspirin (ASA) suppository 300 mg  300 mg Rectal DAILY    sodium chloride (NS) flush 5-40 mL  5-40 mL IntraVENous Q8H    sodium chloride (NS) flush 5-40 mL  5-40 mL IntraVENous PRN    acetaminophen (TYLENOL) tablet 650 mg  650 mg Oral Q6H PRN    Or    acetaminophen (TYLENOL) suppository 650 mg  650 mg Rectal Q6H PRN    polyethylene glycol (MIRALAX) packet 17 g  17 g Oral DAILY PRN    ondansetron (ZOFRAN ODT) tablet 4 mg  4 mg Oral Q8H PRN    Or    ondansetron (ZOFRAN) injection 4 mg  4 mg IntraVENous Q6H PRN    dextrose 5% infusion  50 mL/hr IntraVENous CONTINUOUS    cefTRIAXone (ROCEPHIN) 1 g in 0.9% sodium chloride (MBP/ADV) 50 mL MBP  1 g IntraVENous Q24H       Social History:  Social History     Tobacco Use    Smoking status: Never Smoker    Smokeless tobacco: Never Used   Substance Use Topics    Alcohol use: No       Family History:  History reviewed. No pertinent family history. Review of Systems: limited due to AMS      Objective:     Patient Vitals for the past 8 hrs:   BP Temp Pulse Resp SpO2   06/24/21 0811 (!) 163/69 97.5 °F (36.4 °C) (!) 56 16 97 %     No intake/output data recorded. No intake/output data recorded. PHYSICAL EXAM:  General appearance:  no distress, appears stated age  Skin: no jaundice   HEENT: Sclerae anicteric. Right facial droop  Cardiovascular: Regular rate and rhythm. No murmurs, gallops, or rubs.    Respiratory: Comfortable breathing with no accessory muscle use. Clear breath sounds with no wheezes, rales, or rhonchi. GI: Abdomen nondistended, soft, and nontender. Normal active bowel sounds. No enlargement of the liver or spleen. No masses palpable. Rectal: Deferred   Musculoskeletal: No pitting edema of the lower legs. Neurological: Patient is alert, slurred speech   Psychiatric: +agitation and combativeness      Data Review     Recent Labs     06/23/21  0704 06/22/21  1410   WBC 4.9 5.6   HGB 12.5 13.3   HCT 41.9 44.9    220     Recent Labs     06/23/21  0704 06/22/21  1410   * 143   K 3.9 4.2   * 109*   CO2 26 27   BUN 17 18   CREA 0.67 1.02   GLU 56* 75   CA 8.7 9.4     Recent Labs     06/22/21  1410      TP 7.9   ALB 3.6   GLOB 4.3*     No results for input(s): INR, PTP, APTT, INREXT in the last 72 hours. Imaging studies reviewed    Assessment / Plan :     PEG placement  81 yo female, with progressive dementia and acute left anterior marcela infarct,   previously fed herself until 3 days PTA. Failed SLP exam today. Family wishes to discuss option of PEG placement further       - if family wishes to proceed please contact us        Patient Active Hospital Problem List:   Active Problems:    FTT (failure to thrive) in adult (6/22/2021)      CVA (cerebral vascular accident) ([facility] [address] (6/22/2021)    I have personally reviewed the history and independently examined the patient. I have reviewed the chart and agree with the documentation recorded by the Mid Level Provider, including the assessment, treatment plan, and disposition.     Erika Ellis MD

## 2021-06-24 NOTE — PROGRESS NOTES
Bedside shift change report given to [address] (oncoming nurse) [facility] Report included the following information SBAR, Kardex, MAR and Recent Results.

## 2021-06-24 NOTE — PROGRESS NOTES
Patient resting in bed, more alert and somewhat verbal today. Family at bedside, ok for trial of ice chips. Problem: Patient Education: Go to Patient Education Activity  Goal: Patient/Family Education  Outcome: Progressing Towards Goal     Problem: Falls - Risk of  Goal: *Absence of Falls  Description: Document Rory Rosas Fall Risk and appropriate interventions in the flowsheet. Outcome: Progressing Towards Goal  Note: Fall Risk Interventions:  Mobility Interventions: Communicate number of staff needed for ambulation/transfer, Bed/chair exit alarm    Mentation Interventions: Door open when patient unattended, Bed/chair exit alarm, Adequate sleep, hydration, pain control         Elimination Interventions: Toileting schedule/hourly rounds, Call light in reach, Bed/chair exit alarm              Problem: Patient Education: Go to Patient Education Activity  Goal: Patient/Family Education  Outcome: Progressing Towards Goal     Problem: Pressure Injury - Risk of  Goal: *Prevention of pressure injury  Description: Document Rivas Scale and appropriate interventions in the flowsheet.   Outcome: Progressing Towards Goal  Note: Pressure Injury Interventions:  Sensory Interventions: Float heels, Keep linens dry and wrinkle-free, Minimize linen layers    Moisture Interventions: Apply protective barrier, creams and emollients, Absorbent underpads, Internal/External urinary devices    Activity Interventions: Pressure redistribution bed/mattress(bed type)    Mobility Interventions: Float heels, HOB 30 degrees or less, Pressure redistribution bed/mattress (bed type)    Nutrition Interventions:  (NPO, consult for PEG tube)    Friction and Shear Interventions: Lift sheet, Lift team/patient mobility team, Minimize layers, HOB 30 degrees or less                Problem: Patient Education: Go to Patient Education Activity  Goal: Patient/Family Education  Outcome: Progressing Towards Goal

## 2021-06-24 NOTE — PROGRESS NOTES
Speech Therapy    Patient currently meeting with palliative. Will continue to follow pending goals of care. Rosey Chavarria M.S., CCC-SLP

## 2021-06-24 NOTE — CONSULTS
Palliative Medicine Consult  Eris: [phone] ([phone])    Patient Name: Dudley Butler  YOB: 1927    Date of Initial Consult: June 23, 2021  Reason for Consult: Care Decisions   Requesting Provider: Jhonatan Rivera   Primary Care Physician: Lloyd Napier MD     SUMMARY:   Dudley Butler is a 80 y.o. with a past history of dementia with behavioral disturbance, HTN, who was admitted on 6/22/2021 from home with a diagnosis of FTT/progressive dementia, hypoglycemia, hypernatremia, low TSH, possible acute cystitis, rule out stroke  Family reported the pt have decrease appetite, sleeping all of the time, not drinking or eating. PMH: HTN, dementia, bedbound, anorexia,      Current medical issues leading to Palliative Medicine involvement include: support with care decisions given poor prognosis. Full Code  Social: lives at home with son, daughter, nephew and 24 hour care giver(family member). Baseline: mostly wheelchair bound, eating regular diet with healthy appetite, communicates with family but mostly about past experiences, periods of agitation and combativeness      PALLIATIVE DIAGNOSES:   1. Anorexia  2. Delirium]  3. Agitation   4. Goals of care  5. FTT  6. Physical debility      PLAN:   1. Pt seen without family. She is unable to provide any information due to her medical condition. 2. Noted acute stroke  3. Met with 2 daughters~ Silva Rojas and Phil and 2 granddaughters  4. Goal today to establish an alliance as multiple care goal discussions already taken place and family has been firm on full code and restorative measures  5. Explained our service in detail. Pt was enrolled in hospice about 2 years ago but then failed to decline so she graduated from the program.  Hospice was a good experience (per family). 6. Explained high probability of rapid progression of dementia this admission given this acute event  7. Pt failed SLP exam today.   Hoping she will be able to tolerate a dysphagia diet in the upcoming days to avoid Peg decision  8. Will continue to follow along. Contact information provided  9. Initial consult note routed to primary continuity provider and/or primary health care team members  10. Communicated plan of care with: Palliative Teagan PARKER 192 Team     GOALS OF CARE / TREATMENT PREFERENCES:     GOALS OF CARE:  Patient/Health Care Proxy Stated Goals: Prolong life    TREATMENT PREFERENCES:   Code Status: Full Code    Advance Care Planning:  [x] [facility] Interdisciplinary Team has updated the ACP Navigator with [address] and Patient Capacity    . heal   dtr Bandar Sinclair [phone]. No AMD on file  Advance Care Planning 9/6/2018   Patient's Healthcare Decision Maker is: Unable to obtain   Primary Decision Maker Name Bandar Sinclair  no documented mpoa with multiple children   Primary Decision Maker Phone Number [phone]   Primary Decision Maker Relationship to Patient Adult child   Confirm Advance Directive None       Medical Interventions: Full interventions     Other Instructions: Other:    As far as possible, the palliative care team has discussed with patient / health care proxy about goals of care / treatment preferences for patient.      HISTORY:     History obtained from: chart, team    CHIEF COMPLAINT: pt admitted with aforementioned history and issues    HPI/SUBJECTIVE:    The patient is:   [] Verbal and participatory  [x] Non-participatory due to:   Medical condition     Clinical Pain Assessment (nonverbal scale for severity on nonverbal patients):   Clinical Pain Assessment  Severity: 0     Activity (Movement): (P) Lying quietly, normal position    Duration: for how long has pt been experiencing pain (e.g., 2 days, 1 month, years)  Frequency: how often pain is an issue (e.g., several times per day, once every few days, constant)     FUNCTIONAL ASSESSMENT:     Palliative Performance Scale (PPS):  PPS: 30 PSYCHOSOCIAL/SPIRITUAL SCREENING:     Palliative IDT has assessed this patient for cultural preferences / practices and a referral made as appropriate to needs (Cultural Services, Patient Advocacy, Ethics, etc.)    Any spiritual / Samaritan concerns:  [] Yes /  [x] No    Caregiver Burnout:  [] Yes /  [x] No /  [] No Caregiver Present      Anticipatory grief assessment:   [x] Normal  / [] Maladaptive       ESAS Anxiety: Anxiety: 0    ESAS Depression: Depression: 0        REVIEW OF SYSTEMS:     Positive and pertinent negative findings in ROS are noted above in HPI. The following systems were [x] reviewed objectively / [] unable to be reviewed as noted in HPI  Other findings are noted below. Systems: constitutional, ears/nose/mouth/throat, respiratory, gastrointestinal, genitourinary, musculoskeletal, integumentary, neurologic, psychiatric, endocrine. Positive findings noted below. Modified ESAS Completed by: provider   Fatigue: 4 Drowsiness: 0   Depression: 0 Pain: 0   Anxiety: 0 Nausea: 0   Anorexia: 0 Dyspnea: 0           Stool Occurrence(s): 0        PHYSICAL EXAM:     From RN flowsheet:  Wt Readings from Last 3 Encounters:   06/23/21 135 lb 2.3 oz (61.3 kg)   09/04/18 123 lb 10.9 oz (56.1 kg)   03/29/18 122 lb 12.8 oz (55.7 kg)     Blood pressure (!) 163/69, pulse (!) 56, temperature 97.5 °F (36.4 °C), resp. rate 16, height 5' (1.524 m), weight 135 lb 2.3 oz (61.3 kg), SpO2 97 %.     Pain Scale 1: (P) Adult Nonverbal Pain Scale  Pain Intensity 1: (P) 0                 Last bowel movement, if known:     Constitutional: alert, nonsensical speech  Eyes: anicteric  ENMT: no nasal discharge, moist mucous membranes  Cardiovascular:  distal pulses intact  Respiratory: breathing not labored, symmetric  Gastrointestinal: soft non-tender,   Musculoskeletal: no deformity, no tenderness to palpation  Skin: warm, dry  Neurologic:dementia  Psychiatric: neg agitation   Other:       HISTORY:     Active Problems:    FTT (failure to thrive) in adult (6/22/2021)      CVA (cerebral vascular accident) [facility]) (6/22/2021)      Past Medical History:   Diagnosis Date    Dementia ([address]     Hypertension       History reviewed. No pertinent surgical history. History reviewed. No pertinent family history. History reviewed, no pertinent family history. Social History     Tobacco Use    Smoking status: Never Smoker    Smokeless tobacco: Never Used   Substance Use Topics    Alcohol use: No     No Known Allergies   Current Facility-Administered Medications   Medication Dose Route Frequency    aspirin (ASA) suppository 300 mg  300 mg Rectal DAILY    sodium chloride (NS) flush 5-40 mL  5-40 mL IntraVENous Q8H    sodium chloride (NS) flush 5-40 mL  5-40 mL IntraVENous PRN    acetaminophen (TYLENOL) tablet 650 mg  650 mg Oral Q6H PRN    Or    acetaminophen (TYLENOL) suppository 650 mg  650 mg Rectal Q6H PRN    polyethylene glycol (MIRALAX) packet 17 g  17 g Oral DAILY PRN    ondansetron (ZOFRAN ODT) tablet 4 mg  4 mg Oral Q8H PRN    Or    ondansetron (ZOFRAN) injection 4 mg  4 mg IntraVENous Q6H PRN    dextrose 5% infusion  50 mL/hr IntraVENous CONTINUOUS    cefTRIAXone (ROCEPHIN) 1 g in 0.9% sodium chloride (MBP/ADV) 50 mL MBP  1 g IntraVENous Q24H          LAB AND IMAGING FINDINGS:     Lab Results   Component Value Date/Time    WBC 4.9 06/23/2021 07:04 AM    HGB 12.5 06/23/2021 07:04 AM    PLATELET 270 50/51/3557 07:04 AM     Lab Results   Component Value Date/Time    Sodium 146 (H) 06/23/2021 07:04 AM    Potassium 3.9 06/23/2021 07:04 AM    Chloride 113 (H) 06/23/2021 07:04 AM    CO2 26 06/23/2021 07:04 AM    BUN 17 06/23/2021 07:04 AM    Creatinine 0.67 06/23/2021 07:04 AM    Calcium 8.7 06/23/2021 07:04 AM    Magnesium 2.0 09/07/2018 09:00 AM      Lab Results   Component Value Date/Time    Alk.  phosphatase 102 06/22/2021 02:10 PM    Protein, total 7.9 06/22/2021 02:10 PM    Albumin 3.6 06/22/2021 02:10 PM    Globulin 4.3 (H) 06/22/2021 02:10 PM     No results found for: INR, PTMR, PTP, PT1, PT2, APTT, INREXT, INREXT   No results found for: IRON, FE, TIBC, IBCT, PSAT, FERR   No results found for: PH, PCO2, PO2  No components found for: [place]   Lab Results   Component Value Date/Time     (H) 09/05/2018 04:26 AM    CK - MB 1.9 09/03/2018 08:25 PM                Total time: 45 min  Counseling / coordination time, spent as noted above:  40 min  > 50% counseling / coordination?: y    Prolonged service was provided for  []30 min   []75 min in face to face time in the presence of the patient, spent as noted above. Time Start:   Time End:   Note: this can only be billed with 53700 (initial) or 21988 (follow up). If multiple start / stop times, list each separately.

## 2021-06-24 NOTE — PROGRESS NOTES
Problem: Dysphagia (Adult)  Goal: *Acute Goals and Plan of Care (Insert Text)  Description: Speech path goals initiated 6/23/2021   1. Patient will participate in swallow re-eval  Outcome: Progressing Towards Goal   SPEECH LANGUAGE PATHOLOGY DYSPHAGIA TREATMENT  Patient: Adelia Guzman (57 y.o. female)  Date: 6/24/2021  Diagnosis: FTT (failure to thrive) in adult [R62.7]  CVA (cerebral vascular accident) ([facility] [address] [I63.9] <principal problem not specified>       Precautions: fall      ASSESSMENT:  Patient with numerous family members present for SLP visit. She demonstrated delayed bolus acceptance from straw with decreased labial seal and suck. Decreased stripping of spoon. Prolonged oral holding for each bite with some anterior loss from L. There was an eventual swallow, with throat clear noted though not consistently. She is at increased risk for silent aspiration given the location of her CVA. Discussed with family who verbalized understanding. She would benefit from [facility], though there is concern that she may not accept PO for the study. PLAN:  Recommendations and Planned Interventions:  Allow ice chips only with RN or family overnight. Will follow up. If she continues to accept PO, will proceed with MBS to determine swallow physiology. Patient continues to benefit from skilled intervention to address the above impairments. Continue treatment per established plan of care. Discharge Recommendations: To Be Determined     SUBJECTIVE:   Patient stated Thank you. OBJECTIVE:   Cognitive and Communication Status:  Neurologic State: Alert  Orientation Level: Unable to verbalize  Cognition: No command following     Perseveration: No perseveration noted     Dysphagia Treatment:  Oral Assessment:  Oral Assessment  Labial:  (no command following to fully assess)  Oral Hygiene: moist, clean  Mandible: No impairment  P.O.  Trials:  Patient Position: upright in bed  Vocal quality prior to P.O.: Hoarse  Consistency Presented: Thin liquid (ice cream)  How Presented: Spoon;Straw     Bolus Acceptance: Impaired (decreased suck via straw. decreased stripping of spoon)  Bolus Formation/Control: Impaired  Type of Impairment:  (oral holding, anterior loss from L)  Propulsion: Delayed (# of seconds)  Oral Residue: 10-50% of bolus        Aspiration Signs/Symptoms: Clear throat                                                                                                                                                      Pain:  Pain Scale 1: (P) Adult Nonverbal Pain Scale  Pain Intensity 1: (P) 0       After treatment:   Patient left in no apparent distress in bed, Call bell within reach, Nursing notified, and Caregiver / family present    COMMUNICATION/EDUCATION:   Patient was educated regarding purpose of SLP visit, recommendations, risk for silent aspiration. Patient did not understand. Family present and asked appropriate questions. The patient's plan of care including recommendations, planned interventions, and recommended diet changes were discussed with: Registered nurse.      Kacey Salgado SLP  Time Calculation: 22 mins

## 2021-06-24 NOTE — PROGRESS NOTES
Bedside and Verbal shift change report given to [facility], [address] RN extern(oncoming nurse) by Varun Araiza RN (offgoing nurse). Report included the following information SBAR, Kardex, Intake/Output, MAR and Recent Results.

## 2021-06-24 NOTE — CONSULTS
[address]    Name:  Leanne Jeffers  MR#:  [id number]  :  1927  ACCOUNT #:  [de-identified]  DATE OF SERVICE:  2021      NEUROLOGY CONSULTATION    HISTORY OF PRESENT ILLNESS:  This is a 55-year-old right-handed female, who was admitted 21 due to increased somnolence and altered mental status. According to chart notes, the patient had decreased p.o. intake for the last few days. According to the family at the bedside, she ate very well on . Then two days ago, her daughter noticed that she had right facial weakness and has noted right arm swelling. Although, she was seen by her primary care doctor and possibly found to have UTI, but was sent for CT of the head on the , which did not show any acute process. On presentation yesterday, she had another CT, which did not show any acute changes. She had a CTA of the head and neck without any significant areas of stenosis or large vessel occlusions. She has undergone an MRI of the brain, which showed an acute left anterior mid pontine infarct. She also has some chronic ischemic disease that is mild. Her family denies any prior history of stroke. There is mention of hypertension in the chart, but the family reports it has been low in the recent past and in fact her hypertensive meds were stopped. She does not have any known hyperlipidemia, diabetes, tobacco use. She is not on antiplatelet therapy or anticoagulation at home. She does have a history of dementia. Daughter reports taking her to her PCP seven or eight years ago for evaluation and they were not told at that time it was dementia, though it was suspected, and then four years ago, there was noticeable decline I believe in association with her son passing away. The patient currently lives with a son and grandson, but her daughter is there much of the time also assisting in her care.   They note the patient loves to eat, she feeds herself, and is very verbal, though it sounds like she may not always answer questions appropriately. She is in a wheelchair at baseline and needs assistance with all of her other ADLs. She is on Seroquel at home and they note that her behavior becomes difficult when she is not taking this. She has been n.p.o. since admission due to her altered mental status. She is seeing Speech Therapy at the bedside, now that she is more alert. They do note she was swallowing normally on a recent modified barium swallow with Speech Therapy consult due to concern she may be aspirating, but she passed the study. PAST MEDICAL HISTORY:  1. Dementia as described above. 2.  History of hypertension, not currently on any medications. REVIEW OF SYSTEMS:  Cannot be obtained from the patient due to a baseline dementia. MEDICATIONS AT HOME:  Seroquel. ALLERGIES:  NONE. SOCIAL HISTORY:  She is . She lives with her son, grandson, and daughters that are heavily involved in her care. No tobacco, alcohol or drug use. FAMILY HISTORY:  No significant family history. PHYSICAL EXAMINATION:  VITAL SIGNS:  Blood pressure 163/69, pulse 56, respiratory rate 16, saturating 97% on room air, temperature is 97.5. Her BMI is 26.4. GENERAL:  She is a well-nourished, well-developed, healthy-appearing elderly female, lying in bed, in no distress. HEART:  Her heart has a regular rhythm without murmurs. Carotids are 2+. No bruits. EXTREMITIES:  Warm. She had some dependent edema in the right upper and lower extremities. NEUROLOGICAL EXAMINATION:  Mental status, she is alert. She is interactive. She has dysarthria making difficult to understand her, but some words are very clear. She does not answer questions for me, however, she does follow some commands such as to smile, squeeze my hand. Her cranial nerve exam, she has right facial weakness. Extraocular eye movements appeared to be intact to spontaneous movement.   Her pupils are equally round and reactive. Her palate is elevated. Her hearing seems to be intact. Motor exam, she is moving her left arm and leg very well with 5/5  in biceps on the right. She has a minimal  and I do not appreciate any movement to noxious stim in the right lower extremity with daughter noting she has limited movement in that leg at baseline, she claims due to arthritis. No tremors are seen. Sensory exam appeared to be intact to noxious stim on the left, more so than the right. Coordination could not be assessed due to patient factors. Gait could not be assessed. She is nonambulatory at baseline. Reflexes were present and symmetric. Toes were downgoing. STUDIES AND REPORTS:  In addition to that mentioned above, her TSH is 0.19, her glucose is 56 at presentation, sodium 146. ASSESSMENT AND PLAN:  This is a 59-year-old right-handed female with dementia presenting with change in mental status and decreased p.o. intake with right facial weakness and right arm weakness, found to have acute left pontine infarct. CTA of the head and neck and CT of the head were unremarkable. Recommend completing her stroke workup with fasting lipid profile, hemoglobin A1c, echocardiogram, though I am not certain how this would . I will start her on aspirin 300 mg per rectum daily until she is cleared for p.o.'s.  Discussed the case with Speech Therapy and they do not feel it is safe for her to swallow and note that she might be having silent aspiration. Yesterday, she was just holding food in her mouth; today, she was actually participating in swallowing, but they feel that it is safest to get a modified barium swallow hopefully tomorrow. She is fully interactive and following commands.   Case was discussed with Palliative Care as well and I discussed with the family where the patient and the family's wishes might be should she need a feeding tube, they noted that she really likes to eat, but if she could not swallow, they believe they would opt for a feeding tube and they are going to discuss further as the family. Obviously the decision does not need to be made at this time. We will see where things stand after her swallowing study.       MD SERGIO [id number]  D:  06/24/2021 12:03  T:  06/24/2021 13:06  JOB #:  [id number]

## 2021-06-25 ENCOUNTER — APPOINTMENT (OUTPATIENT)
Dept: GENERAL RADIOLOGY | Age: 86
DRG: 065 | End: 2021-06-25
Attending: HOSPITALIST
Payer: MEDICARE

## 2021-06-25 LAB
ECHO AO ROOT DIAM: 2.63 CM
ECHO AV AREA PEAK VELOCITY: 1.66 CM2
ECHO AV AREA/BSA PEAK VELOCITY: 1.1 CM2/M2
ECHO AV PEAK GRADIENT: 7.93 MMHG
ECHO AV PEAK VELOCITY: 140.78 CM/S
ECHO LA AREA 4C: 10.02 CM2
ECHO LA MAJOR AXIS: 3.02 CM
ECHO LA MINOR AXIS: 1.91 CM
ECHO LA VOL 2C: 23.49 ML (ref 22–52)
ECHO LA VOL 4C: 19.01 ML (ref 22–52)
ECHO LA VOL BP: 22.38 ML (ref 22–52)
ECHO LA VOL/BSA BIPLANE: 14.16 ML/M2 (ref 16–28)
ECHO LA VOLUME INDEX A2C: 14.87 ML/M2 (ref 16–28)
ECHO LA VOLUME INDEX A4C: 12.03 ML/M2 (ref 16–28)
ECHO LV INTERNAL DIMENSION DIASTOLIC: 2.92 CM (ref 3.9–5.3)
ECHO LV INTERNAL DIMENSION SYSTOLIC: 1.93 CM
ECHO LV IVSD: 1.26 CM (ref 0.6–0.9)
ECHO LV MASS 2D: 93.5 G (ref 67–162)
ECHO LV MASS INDEX 2D: 59.2 G/M2 (ref 43–95)
ECHO LV POSTERIOR WALL DIASTOLIC: 0.97 CM (ref 0.6–0.9)
ECHO LVOT DIAM: 1.92 CM
ECHO LVOT PEAK GRADIENT: 2.6 MMHG
ECHO LVOT PEAK VELOCITY: 80.67 CM/S
ECHO MV A VELOCITY: 106.48 CM/S
ECHO MV AREA PHT: 2.69 CM2
ECHO MV E DECELERATION TIME (DT): 281.78 MS
ECHO MV E VELOCITY: 50.51 CM/S
ECHO MV E/A RATIO: 0.47
ECHO MV PRESSURE HALF TIME (PHT): 81.72 MS
ECHO RV INTERNAL DIMENSION: 2.84 CM
GLUCOSE BLD STRIP.AUTO-MCNC: 101 MG/DL (ref 65–117)
GLUCOSE BLD STRIP.AUTO-MCNC: 102 MG/DL (ref 65–117)
GLUCOSE BLD STRIP.AUTO-MCNC: 103 MG/DL (ref 65–117)
SERVICE CMNT-IMP: NORMAL

## 2021-06-25 PROCEDURE — 74011250637 HC RX REV CODE- 250/637: Performed by: PSYCHIATRY & NEUROLOGY

## 2021-06-25 PROCEDURE — 74011250636 HC RX REV CODE- 250/636: Performed by: NURSE PRACTITIONER

## 2021-06-25 PROCEDURE — 74011250636 HC RX REV CODE- 250/636: Performed by: INTERNAL MEDICINE

## 2021-06-25 PROCEDURE — 99233 SBSQ HOSP IP/OBS HIGH 50: CPT | Performed by: NURSE PRACTITIONER

## 2021-06-25 PROCEDURE — 92526 ORAL FUNCTION THERAPY: CPT | Performed by: SPEECH-LANGUAGE PATHOLOGIST

## 2021-06-25 PROCEDURE — 74230 X-RAY XM SWLNG FUNCJ C+: CPT

## 2021-06-25 PROCEDURE — 65270000029 HC RM PRIVATE

## 2021-06-25 PROCEDURE — 92611 MOTION FLUOROSCOPY/SWALLOW: CPT | Performed by: SPEECH-LANGUAGE PATHOLOGIST

## 2021-06-25 PROCEDURE — 82962 GLUCOSE BLOOD TEST: CPT

## 2021-06-25 PROCEDURE — 74011250636 HC RX REV CODE- 250/636: Performed by: HOSPITALIST

## 2021-06-25 RX ORDER — HYDRALAZINE HYDROCHLORIDE 20 MG/ML
20 INJECTION INTRAMUSCULAR; INTRAVENOUS
Status: DISCONTINUED | OUTPATIENT
Start: 2021-06-25 | End: 2021-06-28 | Stop reason: HOSPADM

## 2021-06-25 RX ORDER — HALOPERIDOL 5 MG/ML
3 INJECTION INTRAMUSCULAR
Status: DISCONTINUED | OUTPATIENT
Start: 2021-06-25 | End: 2021-06-28 | Stop reason: HOSPADM

## 2021-06-25 RX ADMIN — HALOPERIDOL LACTATE 3 MG: 5 INJECTION, SOLUTION INTRAMUSCULAR at 17:59

## 2021-06-25 RX ADMIN — HYDRALAZINE HYDROCHLORIDE 20 MG: 20 INJECTION INTRAMUSCULAR; INTRAVENOUS at 09:09

## 2021-06-25 RX ADMIN — ASPIRIN 300 MG: 300 SUPPOSITORY RECTAL at 08:33

## 2021-06-25 RX ADMIN — DEXTROSE MONOHYDRATE 50 ML/HR: 50 INJECTION, SOLUTION INTRAVENOUS at 17:58

## 2021-06-25 NOTE — PROGRESS NOTES
06/25/21 0833   Vital Signs   Temp 97.5 °F (36.4 °C)   Temp Source Axillary   Pulse (Heart Rate) 93   Heart Rate Source Monitor   Resp Rate 18   O2 Sat (%) 95 %   Level of Consciousness Alert (0)   BP (!) 204/95   MAP (Calculated) 131   BP 1 Method Automatic   BP 1 Location Right upper arm   BP Patient Position At rest   MEWS Score 3   Oxygen Therapy   O2 Device None (Room air)   Notified MD for BP medication, patient cannot tolerate oral medication at this time.

## 2021-06-25 NOTE — PROGRESS NOTES
Patient resting in bed, agitated wants to get out of bed, bilateral legs and arms with movement, tracking with eyes. Problem: Patient Education: Go to Patient Education Activity  Goal: Patient/Family Education  Outcome: Progressing Towards Goal     Problem: Falls - Risk of  Goal: *Absence of Falls  Description: Document Sukhdeep Quinonez Fall Risk and appropriate interventions in the flowsheet. Outcome: Progressing Towards Goal  Note: Fall Risk Interventions:  Mobility Interventions: Communicate number of staff needed for ambulation/transfer, Bed/chair exit alarm    Mentation Interventions: Bed/chair exit alarm, Adequate sleep, hydration, pain control, Door open when patient unattended    Medication Interventions: Evaluate medications/consider consulting pharmacy, Bed/chair exit alarm    Elimination Interventions: Patient to call for help with toileting needs              Problem: Patient Education: Go to Patient Education Activity  Goal: Patient/Family Education  Outcome: Progressing Towards Goal     Problem: Pressure Injury - Risk of  Goal: *Prevention of pressure injury  Description: Document Rivas Scale and appropriate interventions in the flowsheet.   Outcome: Progressing Towards Goal  Note: Pressure Injury Interventions:  Sensory Interventions: Check visual cues for pain, Float heels, Keep linens dry and wrinkle-free, Maintain/enhance activity level, Minimize linen layers    Moisture Interventions: Apply protective barrier, creams and emollients, Absorbent underpads    Activity Interventions: Pressure redistribution bed/mattress(bed type), Increase time out of bed    Mobility Interventions: HOB 30 degrees or less, Pressure redistribution bed/mattress (bed type), Float heels    Nutrition Interventions:  (NPO)    Friction and Shear Interventions: HOB 30 degrees or less, Lift sheet, Lift team/patient mobility team                Problem: Patient Education: Go to Patient Education Activity  Goal: Patient/Family Education  Outcome: Progressing Towards Goal

## 2021-06-25 NOTE — CONSULTS
Palliative Medicine Consult  Eris: [phone])    Patient Name: Anai Montes De Oca  YOB: 1927    Date of Initial Consult: June 23, 2021  Reason for Consult: Care Decisions   Requesting Provider: Judit Sargent   Primary Care Physician: Billy Goetz MD     SUMMARY:   Anai Montes De Oca is a 80 y.o. with a past history of dementia with behavioral disturbance, HTN, who was admitted on 6/22/2021 from home with a diagnosis of FTT/progressive dementia, hypoglycemia, hypernatremia, low TSH, possible acute cystitis, rule out stroke  Family reported the pt have decrease appetite, sleeping all of the time, not drinking or eating. PMH: HTN, dementia, bedbound, anorexia,      Current medical issues leading to Palliative Medicine involvement include: support with care decisions given poor prognosis. Full Code  Social: lives at home with son, daughter, nephew and 24 hour care giver(family member). Baseline: mostly wheelchair bound, eating regular diet with healthy appetite, communicates with family but mostly about past experiences, periods of agitation and combativeness      PALLIATIVE DIAGNOSES:   1. Anorexia  2. Delirium  3. Agitation   4. Goals of care  5. FTT  6. Physical debility      PLAN:   1. Daughters at the bedside. Pt very agitated and combative today. I ordered prn haldol  2. Cleared for pureed diet so peg is a moot point  3. They are amenable to hospice at home so I placed the consult  4. Full Code  5. Will follow up next week if still admitted. Otherwise goals are clear. 6. Initial consult note routed to primary continuity provider and/or primary health care team members  7.  Communicated plan of care with: Palliative Teagan PARKER 192 Team     GOALS OF CARE / TREATMENT PREFERENCES:     GOALS OF CARE:  Patient/Health Care Proxy Stated Goals: Prolong life    TREATMENT PREFERENCES:   Code Status: Full Code    Advance Care Planning:  [x] The Pall Med Interdisciplinary Team has updated the ACP Navigator with Health Care Decision Maker and Patient Capacity    . heal   dtr Sania Cooper [phone]. No AMD on file  Advance Care Planning 9/6/2018   Patient's Healthcare Decision Maker is: Unable to obtain   Primary Decision Maker Name Sania Cooper  no documented mpoa with multiple children   Primary Decision Maker Phone Number [phone]   Primary Decision Maker Relationship to Patient Adult child   Confirm Advance Directive None       Medical Interventions: Full interventions     Other Instructions:   Artificially Administered Nutrition: Feeding tube long-term, if indicated     Other:    As far as possible, the palliative care team has discussed with patient / health care proxy about goals of care / treatment preferences for patient.      HISTORY:     History obtained from: chart, team    CHIEF COMPLAINT: pt admitted with aforementioned history and issues    HPI/SUBJECTIVE:    The patient is:   [] Verbal and participatory  [x] Non-participatory due to:   Medical condition     Clinical Pain Assessment (nonverbal scale for severity on nonverbal patients):   Clinical Pain Assessment  Severity: 0     Activity (Movement): Lying quietly, normal position    Duration: for how long has pt been experiencing pain (e.g., 2 days, 1 month, years)  Frequency: how often pain is an issue (e.g., several times per day, once every few days, constant)     FUNCTIONAL ASSESSMENT:     Palliative Performance Scale (PPS):  PPS: 30       PSYCHOSOCIAL/SPIRITUAL SCREENING:     Palliative IDT has assessed this patient for cultural preferences / practices and a referral made as appropriate to needs (Cultural Services, Patient Advocacy, Ethics, etc.)    Any spiritual / Mandaeism concerns:  [] Yes /  [x] No    Caregiver Burnout:  [] Yes /  [x] No /  [] No Caregiver Present      Anticipatory grief assessment:   [x] Normal  / [] Maladaptive       ESAS Anxiety: Anxiety: 6    ESAS Depression: Depression: 0 REVIEW OF SYSTEMS:     Positive and pertinent negative findings in ROS are noted above in HPI. The following systems were [x] reviewed objectively / [] unable to be reviewed as noted in HPI  Other findings are noted below. Systems: constitutional, ears/nose/mouth/throat, respiratory, gastrointestinal, genitourinary, musculoskeletal, integumentary, neurologic, psychiatric, endocrine. Positive findings noted below. Modified ESAS Completed by: provider   Fatigue: 4 Drowsiness: 0   Depression: 0 Pain: 0   Anxiety: 6 Nausea: 0   Anorexia: 0 Dyspnea: 0           Stool Occurrence(s): 0        PHYSICAL EXAM:     From RN flowsheet:  Wt Readings from Last 3 Encounters:   06/25/21 135 lb 2.3 oz (61.3 kg)   09/04/18 123 lb 10.9 oz (56.1 kg)   03/29/18 122 lb 12.8 oz (55.7 kg)     Blood pressure (!) 106/54, pulse (!) 109, temperature 97.5 °F (36.4 °C), resp. rate 18, height 5' (1.524 m), weight 135 lb 2.3 oz (61.3 kg), SpO2 95 %. Pain Scale 1: Numeric (0 - 10)  Pain Intensity 1: 0                 Last bowel movement, if known:     Constitutional: alert, nonsensical speech, agitated   Eyes: anicteric  ENMT: no nasal discharge, moist mucous membranes  Cardiovascular:  distal pulses intact  Respiratory: breathing not labored, symmetric  Gastrointestinal: soft non-tender,   Musculoskeletal: no deformity, no tenderness to palpation  Skin: warm, dry  Neurologic:dementia  Psychiatric: combative, agitation   Other:       HISTORY:     Active Problems:    FTT (failure to thrive) in adult (6/22/2021)      CVA (cerebral vascular accident) ([facility] [address] (6/22/2021)      Past Medical History:   Diagnosis Date    Dementia ([facility] [address]     As of 6/2021, pt has h/o dementia for approx 8 years, she is verbal and feeds herself at home, she is in a [facility] and needs assistance with ADLs o/w.  Hypertension       History reviewed. No pertinent surgical history. History reviewed. No pertinent family history.    History reviewed, no pertinent family history. Social History     Tobacco Use    Smoking status: Never Smoker    Smokeless tobacco: Never Used   Substance Use Topics    Alcohol use: No     No Known Allergies   Current Facility-Administered Medications   Medication Dose Route Frequency    hydrALAZINE (APRESOLINE) 20 mg/mL injection 20 mg  20 mg IntraVENous Q6H PRN    aspirin (ASA) suppository 300 mg  300 mg Rectal DAILY    sodium chloride (NS) flush 5-40 mL  5-40 mL IntraVENous Q8H    sodium chloride (NS) flush 5-40 mL  5-40 mL IntraVENous PRN    acetaminophen (TYLENOL) tablet 650 mg  650 mg Oral Q6H PRN    Or    acetaminophen (TYLENOL) suppository 650 mg  650 mg Rectal Q6H PRN    polyethylene glycol (MIRALAX) packet 17 g  17 g Oral DAILY PRN    ondansetron (ZOFRAN ODT) tablet 4 mg  4 mg Oral Q8H PRN    Or    ondansetron (ZOFRAN) injection 4 mg  4 mg IntraVENous Q6H PRN    dextrose 5% infusion  50 mL/hr IntraVENous CONTINUOUS          LAB AND IMAGING FINDINGS:     Lab Results   Component Value Date/Time    WBC 4.9 06/23/2021 07:04 AM    HGB 12.5 06/23/2021 07:04 AM    PLATELET 210 52/36/0264 07:04 AM     Lab Results   Component Value Date/Time    Sodium 146 (H) 06/23/2021 07:04 AM    Potassium 3.9 06/23/2021 07:04 AM    Chloride 113 (H) 06/23/2021 07:04 AM    CO2 26 06/23/2021 07:04 AM    BUN 17 06/23/2021 07:04 AM    Creatinine 0.67 06/23/2021 07:04 AM    Calcium 8.7 06/23/2021 07:04 AM    Magnesium 2.0 09/07/2018 09:00 AM      Lab Results   Component Value Date/Time    Alk.  phosphatase 102 06/22/2021 02:10 PM    Protein, total 7.9 06/22/2021 02:10 PM    Albumin 3.6 06/22/2021 02:10 PM    Globulin 4.3 (H) 06/22/2021 02:10 PM     No results found for: INR, PTMR, PTP, PT1, PT2, APTT, INREXT, INREXT   No results found for: IRON, FE, TIBC, IBCT, PSAT, FERR   No results found for: PH, PCO2, PO2  No components found for: [place]   Lab Results   Component Value Date/Time     (H) 09/05/2018 04:26 AM    CK - MB 1.9 09/03/2018 08:25 PM Total time: 45 min  Counseling / coordination time, spent as noted above:  40 min  > 50% counseling / coordination?: y    Prolonged service was provided for  []30 min   []75 min in face to face time in the presence of the patient, spent as noted above. Time Start:   Time End:   Note: this can only be billed with 70606 (initial) or 13847 (follow up). If multiple start / stop times, list each separately.

## 2021-06-25 NOTE — PROGRESS NOTES
Bedside shift change report given to [address] (oncoming nurse) by Abdoul Jackson (offgoing nurse). Report included the following information SBAR, Kardex, MAR and Recent Results.

## 2021-06-25 NOTE — DISCHARGE INSTR - DIET
Nutrition Recommendations for Discharge:    Continue Oral Nutrition Supplements at discharge: Ensure Pudding, Boost Pudding or Magic Cup or similar product  Twice daily for 30 days unless otherwise directed by your Primary Care Physician. This product can be purchased at your local grocery store, pharmacy and/or online.     Mark Anthony Randolph RD

## 2021-06-25 NOTE — PROGRESS NOTES
Problem: Dysphagia (Adult)  Goal: *Acute Goals and Plan of Care (Insert Text)  Description: Speech path goals initiated 6/23/2021   1. Patient will participate in swallow re-eval  Outcome: Progressing Towards Goal     SPEECH LANGUAGE PATHOLOGY DYSPHAGIA TREATMENT  Patient: Kaz Del Rosario (53 y.o. female)  Date: 6/25/2021  Diagnosis: FTT (failure to thrive) in adult [R62.7]  CVA (cerebral vascular accident) ([facility] [address] [I63.9] <principal problem not specified>       Precautions: Aspiration      ASSESSMENT:  Patient accepted ice chips, straw sips of thin liquids and purees with verbal cues and increased time to accept. Note inconsistent labial closure for bolus retrieval as well. Patient demonstrated delayed oral manipulation and oral transit with oral holding. Once pharyngeal swallow initiated patient demonstrated no oral residue and no overt s/s aspiration. Given bedside presentation and location of CVA recommend proceed with MBS for further assessment of swallow physiology. PLAN:  Recommendations and Planned Interventions:  Proceed with MBS  Patient continues to benefit from skilled intervention to address the above impairments. Continue treatment per established plan of care. Discharge Recommendations: To Be Determined     SUBJECTIVE:   Patient stated Who did your hair?  to family member. Multiple family members at bedside. RN reports patient accepting ice chips earlier this morning. OBJECTIVE:   Cognitive and Communication Status:  Neurologic State: Alert  Orientation Level: Oriented to person  Cognition: Decreased attention/concentration, Decreased command following     Perseveration: No perseveration noted     Dysphagia Treatment:  Oral Assessment:     P.O. Trials:  Patient Position: Upright in bed  Vocal quality prior to P.O.: Low volume  Consistency Presented: Ice chips;Puree; Thin liquid  How Presented: SLP-fed/presented;Spoon;Straw     Bolus Acceptance: Impaired  Bolus Formation/Control: Impaired  Type of Impairment: Delayed  Propulsion: Delayed (# of seconds)  Oral Residue: None  Initiation of Swallow: Delayed (# of seconds)  Laryngeal Elevation:  (Palpable)  Aspiration Signs/Symptoms: None                        After treatment:   Patient left in no apparent distress in bed, Call bell within reach, Nursing notified, and Caregiver / family present    COMMUNICATION/EDUCATION:   Patient was educated regarding role of SLP and POC. Patient with off topic comment. Family verbalizes understanding. The patient's plan of care including recommendations, planned interventions, and recommended diet changes were discussed with: Registered nurse.      Lorne Meraz SLP  Time Calculation: 18 mins

## 2021-06-25 NOTE — PROGRESS NOTES
Problem: Dysphagia (Adult)  Goal: *Acute Goals and Plan of Care (Insert Text)  Description: Speech path goals initiated 6/23/2021   1. Patient will participate in swallow re-eval  MET  2. New goal 6/25/21  Patient will tolerate pureed diet, mildly thickened liquids without overt s/s aspiration within 7 days. 6/25/2021 1422 by Thierno Angel, SLP  Outcome: Progressing Towards Goal  6/25/2021 1232 by Thierno Angel, SLP  Outcome: Progressing Towards Goal     [address] STUDY  Patient: Pam Reyna (16 y.o. female)  Date: 6/25/2021  Primary Diagnosis: FTT (failure to thrive) in adult [R62.7]  CVA (cerebral vascular accident) ([facility] [address] [I63.9]        Precautions: Aspiration       ASSESSMENT :  Based on the objective data described below, the patient presents with moderate-severe oral and moderate pharyngeal dysphagia characterized by impaired bolus retrieval, oral holding with all consistencies with mild premature spillage with thin liquids, pharyngeal swallow delay and reduced laryngeal elevation. Dysphagia resulted in penetration and aspiration of thin liquids before/during the swallow and mild pharyngeal residue with purees. Aspiration was silent. Piecemeal swallow pattern noted at times. Solids not trialed due to severity of dysphagia and oral holding. Given MBS feel pureed diet, nectar thickened liquids is safest course. Suspect patient will be at risk for aspiration as well as nutritionally given severeity of oral dysphagia with oral holding. Patient will benefit from skilled intervention to address the above impairments.   Patients rehabilitation potential is considered to be Fair     PLAN :  Recommendations and Planned Interventions:  Pureed diet  Mildly (Nectar) thickened liquids  Sit up for all PO  Small bites  Single sips  Strict supervision with all PO  Ensure patient swallows after each bite/sip  Frequency/Duration: Patient will be followed by speech-language pathology 3 times a week to address goals. Discharge Recommendations: To Be Determined     SUBJECTIVE:   Patient stated I'm scared. Reassured patient and remained with her throughout assessment. OBJECTIVE:     Past Medical History:   Diagnosis Date    Dementia ([address]     As of 6/2021, pt has h/o dementia for approx 8 years, she is verbal and feeds herself at home, she is in a [facility] and needs assistance with ADLs o/w. Hypertension    History reviewed. No pertinent surgical history. Prior Level of Function/Home Situation:      Diet prior to admission: Unknown, presumably regular. Family reports patient loved to eat  Current Diet:  NPO   Radiologist: Dr. Brent Cornelius Views: Lateral;Fluoro  Patient Position:  (Upright in MBS chair)    Trial 1: Trial 2:   Consistency Presented: Thin liquid Consistency Presented: Nectar thick liquid   How Presented: SLP-fed/presented;Straw How Presented: SLP-fed/presented;Straw         Bolus Acceptance: Impaired Bolus Acceptance: Impaired   Bolus Formation/Control: Impaired: Delayed;Premature spillage Bolus Formation/Control: Impaired: Delayed   Propulsion: Delayed (# of seconds) Propulsion: Delayed (# of seconds)   Oral Residue: None Oral Residue: None   Initiation of Swallow: Triggered at pyriform sinus(es) Initiation of Swallow: Triggered at valleculae;Triggered at pyriform sinus(es)   Timing: Pooling 6-10 sec Timing: Pooling 1-5 sec   Penetration: Trace; Before swallow Penetration: None   Aspiration/Timing: Silent ;Trace; Before;During Aspiration/Timing: No evidence of aspiration   Pharyngeal Clearance: No residue Pharyngeal Clearance: No residue                             Trial 3: Trial 4:   Consistency Presented: Puree     How Presented: SLP-fed/presented;Straw           Bolus Acceptance: No impairment     Bolus Formation/Control: Impaired: Delayed  :     Propulsion: Delayed (# of seconds)     Oral Residue: Lingual     Initiation of Swallow: Triggered at valleculae Timing: No impairment     Penetration: None     Aspiration/Timing: No evidence of aspiration     Pharyngeal Clearance: Pyriform residue ;10-50%     Attempted Modifications: Alternate liquids/solids     Effective Modifications: Alternate liquids/solids                   Decreased Tongue Base Retraction?: No  Laryngeal Elevation: Incomplete laryngeal closure  Aspiration/Penetration Score: 8 (Aspiration-Contrast passes cords/glottis with no effort to eject, ie/silent aspiration)             Oral Phase Severity: Moderate-severe  Pharyngeal Phase Severity: Moderate  NOMS:   The NOMS functional outcome measure was used to quantify this patient's level of swallowing impairment. Based on the NOMS, the patient was determined to be at level 4 for swallow function         NOMS Swallowing Levels:  Level 1 (CN): NPO  Level 2 (CM): NPO but takes consistency in therapy  Level 3 (CL): Takes less than 50% of nutrition p.o. and continues with nonoral feedings; and/or safe with mod cues; and/or max diet restriction  Level 4 (CK): Safe swallow but needs mod cues; and/or mod diet restriction; and/or still requires some nonoral feeding/supplements  Level 5 (CJ): Safe swallow with min diet restriction; and/or needs min cues  Level 6 (CI): Independent with p.o.; rare cues; usually self cues; may need to avoid some foods or needs extra time  Level 7 ([address] [place] [address]): Independent for all p.o.  MARY. (2003). National Outcomes Measurement System (NOMS): Adult Speech-Language Pathology User's Guide. COMMUNICATION/EDUCATION:   Patient was educated regarding roll of SLP, diet, aspiration risk. Patient crying out and with off topic comment. Discussed results with family who wonder how long patient will remain in hospital.    The patients plan of care including findings from [facility], recommendations, planned interventions, and recommended diet changes were discussed with: Registered nurse.      Patient/family have participated as able in goal setting and plan of care. Patient/family agree to work toward stated goals and plan of care. Patient is unable to participate in goal setting and plan of care.     Thank you for this referral.  FADI Zuñiga  Time Calculation: 25 mins 25.1

## 2021-06-25 NOTE — PROGRESS NOTES
[address] [facility].  [address] [address] [address], [address]  [phone]                     GI PROGRESS NOTE    Patient Name: Phli Bedoya      : 1927      MRN: [id number]  Admit Date: 2021  Today's Date: 2021    Subjective:     Patient in bed more cooperative and interactive today. Family at bedside. Patient's daughter states the family would like to postpone PEG placement until the patient completes a modified barium swallow and determine if they determine would like to proceed with the PEG. Objective:     Blood pressure (!) 106/54, pulse (!) 109, temperature 97.5 °F (36.4 °C), resp. rate 18, height 5' (1.524 m), weight 61.3 kg (135 lb 2.3 oz), SpO2 95 %. PHYSICAL EXAM:  General appearance:  no distress, appears stated age  Skin: no jaundice   HEENT: Sclerae anicteric. Right facial droop  Cardiovascular: Regular rate and rhythm. No murmurs, gallops, or rubs. Respiratory: Comfortable breathing with no accessory muscle use. Clear breath sounds with no wheezes, rales, or rhonchi. GI: Abdomen nondistended, soft, and nontender. Normal active bowel sounds. No enlargement of the liver or spleen. No masses palpable. Rectal: Deferred   Musculoskeletal: No pitting edema of the lower legs.     Neurological: Patient is alert, slurred speech   Psychiatric: no anxiety or depression     Data Review:    Recent Results (from the past 24 hour(s))   GLUCOSE, POC    Collection Time: 21 12:37 PM   Result Value Ref Range    Glucose (POC) 96 65 - 117 mg/dL    Performed by Lucila Dumont    ECHO ADULT COMPLETE    Collection Time: 21  3:16 PM   Result Value Ref Range    IVSd 1.26 (A) 0.60 - 0.90 cm    LVIDd 2.92 (A) 3.90 - 5.30 cm    LVIDs 1.93 cm    LVOT d 1.92 cm    LVPWd 0.97 (A) 0.60 - 0.90 cm    LVOT Peak Gradient 2.60 mmHg    LVOT Peak Velocity 80.67 cm/s    RVIDd 2.84 cm    Left Atrium Major Axis 3.02 cm    LA Volume 22.38 22.0 - 52.0 mL    LA Area 4C 10.02 cm2 LA Vol 2C 23.49 22.00 - 52.00 mL    LA Vol 4C 19.01 (A) 22.00 - 52.00 mL    Aortic Valve Area by Continuity of Peak Velocity 1.66 cm2    AoV PG 7.93 mmHg    Aortic Valve Systolic Peak Velocity 853.64 cm/s    MV A Jose 106.48 cm/s    Mitral Valve E Wave Deceleration Time 281.78 ms    MV E Jose 50.51 cm/s    Mitral Valve Pressure Half-time 81.72 ms    MVA (PHT) 2.69 cm2    Ao Root D 2.63 cm    MV E/A 0.47     LV Mass AL 93.5 67.0 - 162.0 g    LV Mass AL Index 59.2 43.0 - 95.0 g/m2    Left Atrium Minor Axis 1.91 cm    LA Vol Index 14.16 16.00 - 28.00 ml/m2    LA Vol Index 14.87 16.00 - 28.00 ml/m2    LA Vol Index 12.03 16.00 - 28.00 ml/m2    MALLORY/BSA Pk Jose 1.1 cm2/m2   GLUCOSE, POC    Collection Time: 06/25/21 12:12 AM   Result Value Ref Range    Glucose (POC) 103 65 - 117 mg/dL    Performed by Rojas Jeffries    GLUCOSE, POC    Collection Time: 06/25/21  6:05 AM   Result Value Ref Range    Glucose (POC) 101 65 - 117 mg/dL    Performed by Shae Arita    GLUCOSE, POC    Collection Time: 06/25/21 12:10 PM   Result Value Ref Range    Glucose (POC) 102 65 - 117 mg/dL    Performed by Eugene Campbell / Plan :     PEG placement  79 yo female, with progressive dementia and acute left anterior marcela infarct,   previously fed herself until 3 days PTA. Failed SLP exam 6/25.   Family wishes to discuss option of PEG placement further after MBS this afternoon.       - if family wishes to proceed please contact us         Patient Active Hospital Problem List:   Active Problems:    FTT (failure to thrive) in adult (6/22/2021)      CVA (cerebral vascular accident) [facility]) (6/22/2021)      Fortino Aguero NP

## 2021-06-25 NOTE — PROGRESS NOTES
YESI:  1. RUR-10%  2. Return home with family when stable. 3. [facility] following, she is a [facility] patient. 4. Transportation- family vs BLS. CM noted hospice consult and palliative note. The patient is open to [facility] and would like to send the referral. Referral sent through [employer]. DEAN spoke with Yenny Gavin with [facility], [phone]. She will follow up with cm this weekend about discharge plan. Per  SP note, patient is cleared for pureed diet.     Bryon Dixon [facility]

## 2021-06-25 NOTE — PROGRESS NOTES
[facility]  Hospitalist Group                                                                                          Hospitalist Progress Note  Rad Haro MD  Answering service: [phone] from in house phone        Date of Service:  2021  NAME:  Sylvain Simpson  :  1927  MRN:  [id number]      Admission Summary:   80 y.o. female w/ pmh of htn and dementia who is brought to ER by family for increasing debility and refusal to eat or drink. History is provided by family at bedside as patient is unable to do so given her advanced dementia. Family states patient had been in her baseline health until about 3-1/2 days ago when they noticed a progressive decline in her mentation as well as p.o. tolerance. 24 hours prior to ER presentation, family state that they noted her right facial droop she was seen at [facility] 2 days ago and was discharged back to home. Additionally, family states patient's PCP was consulted and prescribed an antibiotic for presumed UTI which was never started.       Interval history / Subjective:   Patient seen and examined   More alert  Failed swallow / PCP involved in care   Pall care on board family wants a PEG tube      Assessment & Plan:     Acute CVA  FTT / Progressive Dementia  -presenting with cognitive decline, poor oral intake, right facial droop   -MRI  - 1. Acute left anterior mid marcela infarct. 2. Evidence of mild chronic small vessel ischemic change.   -SLP, - failed need PEG if want to peruse feeding   -Unable to participate in PT/OT at this time- has full time care givers at home  -Keep n.p.o. until speech eval once able to swallow can start aspirin ( can go per rectal )  and statin  -Palliative care consulted  And I d/w PCP as well     Hypoglycemia:   Hypernatremia:   -suspect due to decreased oral intake  -Start D5W  -accuchecks every 6 hours    Low TSH:   - T4 within normal limit     Hypertension  -As needed hydralazine as unable to swallow      Dementia  -Seroquel pending speech evaluation     Possible Acute cystitis  -Urinalysis equivocal   -continue Rocephin 1 g daily pending cultures   -Follow blood and urine cultures     Intravascular volume depletion  -Continue MIVF     Code status: Full. Palliative care consulted   DVT prophylaxis: SCDs    Care Plan discussed with: Patient/Family  Anticipated Disposition: Home w/Family  Anticipated Discharge: Need PEG tube      Hospital Problems  Date Reviewed: 9/4/2018        Codes Class Noted POA    FTT (failure to thrive) in adult ICD-10-CM: R62.7  ICD-9-CM: 783.7  6/22/2021 Unknown        CVA (cerebral vascular accident) [facility]) ICD-10-CM: I63.9  ICD-9-CM: 434.91  6/22/2021 Unknown                Review of Systems:   Unable to obtain     MRI BRAIN WO CONT    Result Date: 6/23/2021  1. Acute left anterior mid marcela infarct. 2. Evidence of mild chronic small vessel ischemic change. CT HEAD WO CONT    Result Date: 6/22/2021  No acute findings. CTA HEAD    Result Date: 6/22/2021  No significant lesion demonstrated. CTA NECK    Result Date: 6/22/2021  No significant lesion demonstrated. XR CHEST PORT    Result Date: 6/22/2021  No acute changes. Vital Signs:    Last 24hrs VS reviewed since prior progress note. Most recent are:  Visit Vitals  BP (!) 106/54   Pulse (!) 109   Temp 97.5 °F (36.4 °C)   Resp 18   Ht 5' (1.524 m)   Wt 61.3 kg (135 lb 2.3 oz)   SpO2 95%   BMI 26.39 kg/m²         Intake/Output Summary (Last 24 hours) at 6/25/2021 1050  Last data filed at 6/25/2021 7505  Gross per 24 hour   Intake 200 ml   Output 1300 ml   Net -1100 ml        Physical Examination:     I had a face to face encounter with this patient and independently examined them on 6/25/2021 as outlined below:          Constitutional:  No acute distress, elderly, poor functional state does not respond very well   ENT:  Mucous membrane moist   Resp:  CTA bilaterally.     CV:  bradycardia, no murmurs, gallops, rubs    GI:  Soft, non distended, non tender. normoactive bowel sounds, no hepatosplenomegaly     Musculoskeletal:  No edema, warm, 2+ pulses throughout    Neurologic:  right sided facial droop, unable to follow commands, right upper extremity appears more relaxed compared to left- patient unable to participate in exam             Data Review:    Review and/or order of clinical lab test  Review and/or order of tests in the radiology section of CPT  Review and/or order of tests in the medicine section of CPT      Labs:     Recent Labs     06/23/21  0704 06/22/21  1410   WBC 4.9 5.6   HGB 12.5 13.3   HCT 41.9 44.9    220     Recent Labs     06/23/21  0704 06/22/21  1410   * 143   K 3.9 4.2   * 109*   CO2 26 27   BUN 17 18   CREA 0.67 1.02   GLU 56* 75   CA 8.7 9.4     Recent Labs     06/22/21  1410   ALT 26      TBILI 0.6   TP 7.9   ALB 3.6   GLOB 4.3*     No results for input(s): INR, PTP, APTT, INREXT, INREXT in the last 72 hours. No results for input(s): FE, TIBC, PSAT, FERR in the last 72 hours. No results found for: FOL, RBCF   No results for input(s): PH, PCO2, PO2 in the last 72 hours.   Recent Labs     06/22/21  1410   TROIQ <0.05     Lab Results   Component Value Date/Time    Cholesterol, total 175 06/23/2021 07:04 AM    HDL Cholesterol 40 06/23/2021 07:04 AM    LDL, calculated 115.2 (H) 06/23/2021 07:04 AM    Triglyceride 99 06/23/2021 07:04 AM    CHOL/HDL Ratio 4.4 06/23/2021 07:04 AM     Lab Results   Component Value Date/Time    Glucose (POC) 101 06/25/2021 06:05 AM    Glucose (POC) 103 06/25/2021 12:12 AM    Glucose (POC) 96 06/24/2021 12:37 PM    Glucose (POC) 87 06/24/2021 06:06 AM    Glucose (POC) 83 06/24/2021 12:30 AM     Lab Results   Component Value Date/Time    Color DARK YELLOW 06/22/2021 03:03 PM    Appearance CLOUDY (A) 06/22/2021 03:03 PM    Specific gravity 1.023 06/22/2021 03:03 PM    Specific gravity 1.025 09/03/2018 11:36 PM    pH (UA) 5.0 06/22/2021 03:03 PM    Protein 30 (A) 06/22/2021 03:03 PM    Glucose Negative 06/22/2021 03:03 PM    Ketone 15 (A) 06/22/2021 03:03 PM    Bilirubin NEGATIVE  09/03/2018 11:36 PM    Urobilinogen 1.0 06/22/2021 03:03 PM    Nitrites Negative 06/22/2021 03:03 PM    Leukocyte Esterase TRACE (A) 06/22/2021 03:03 PM    Epithelial cells FEW 06/22/2021 03:03 PM    Bacteria 3+ (A) 06/22/2021 03:03 PM    WBC 5-10 06/22/2021 03:03 PM    RBC 0-5 06/22/2021 03:03 PM         Medications Reviewed:     Current Facility-Administered Medications   Medication Dose Route Frequency    hydrALAZINE (APRESOLINE) 20 mg/mL injection 20 mg  20 mg IntraVENous Q6H PRN    aspirin (ASA) suppository 300 mg  300 mg Rectal DAILY    sodium chloride (NS) flush 5-40 mL  5-40 mL IntraVENous Q8H    sodium chloride (NS) flush 5-40 mL  5-40 mL IntraVENous PRN    acetaminophen (TYLENOL) tablet 650 mg  650 mg Oral Q6H PRN    Or    acetaminophen (TYLENOL) suppository 650 mg  650 mg Rectal Q6H PRN    polyethylene glycol (MIRALAX) packet 17 g  17 g Oral DAILY PRN    ondansetron (ZOFRAN ODT) tablet 4 mg  4 mg Oral Q8H PRN    Or    ondansetron (ZOFRAN) injection 4 mg  4 mg IntraVENous Q6H PRN    dextrose 5% infusion  50 mL/hr IntraVENous CONTINUOUS     ______________________________________________________________________  EXPECTED LENGTH OF STAY: 2d 21h  ACTUAL LENGTH OF STAY:          3                 Rad Haro MD

## 2021-06-25 NOTE — PROGRESS NOTES
NUTRITION  Reason for Rescreen: NPO/Clear Liquids        RECOMMENDATIONS:   Continue puree with mildly (nectar) thick liquids at home    Interventions   - added supplements/snacks: see below       Information obtained from:   Chart review, rounds discussion. Past Medical History:   Diagnosis Date    Dementia [facility])     As of 6/2021, pt has h/o dementia for approx 8 years, she is verbal and feeds herself at home, she is in a [facility] and needs assistance with ADLs o/w.  Hypertension    Pt admitted for failure to thrive. Progressive dementia (nonverbal at baseline, wheelchair bound) with new actute infarct noted. Had been able to feed herself until 3 days PTA per chart review. No wt loss noted prior to admit    Failed SLP eval yesterday with family agreeable to PEG if needed. Happy to see MBS completed today with recommendations for puree with mildly (nectar) thick liquids. With baseline dementia and advanced age would recommend continuing with PO per SLP recommendations with no PEG. Supplements of Boost Pudding BID (480kcal, 14g protein) and Magic Cup BID (580kcal, 18g protein) added today. Also added to d/c paperwork if family wanted to continue at home. Per rounds discussion likely d/c home over the weekend since passed MBS. Will follow if d/c delayed.      Diet:  puree, nectar-thick liquids  Supplements: None  Meal Intake:   Patient Vitals for the past 168 hrs:   % Diet Eaten   06/24/21 1017 0%   06/23/21 1800 0%   06/23/21 1400 0%     Weight Changes:   Unsure  Wt Readings:   06/25/21 61.3 kg (135 lb 2.3 oz)   09/04/18 56.1 kg (123 lb 10.9 oz)     Nutrition-Related Concerns Identified:  Difficulty chewing or swallowing    Estimated Nutrition Needs:   Energy: 1525kcal (25kcal/kg)  Wt used: Current (61kg)  Protein: 61-67g (1-1.1g/kg)  Wt used: Current (61kg)   Fluid: 1525     PLAN:   - Continue current diet  - Check for acceptance of supplements    Will revisit per policy    Nima Fitzpatrick RD [facility], Pager #[phone] or via COADE

## 2021-06-25 NOTE — PROGRESS NOTES
Neurology Progress Note  Beatriz Joseph NP      Date of admission: 6/22/2021    Patient: Sarthak Ramírez MRN: [id number]  SSN: [id number]    YOB: 1927  Age: 80 y.o. Sex: female        Subjective:     HPI: Sarthak Ramírez is a 80 y.o. female  who was admitted yesterday on the 22nd due to increased somnolence and altered mental status. According to chart notes, the patient had decreased p.o. intake for the last few days. According to the family at the bedside, she ate very well on Sunday. Then two days ago, her daughter noticed that she had right facial weakness and has noted right arm swelling. Although, she was seen by her primary care doctor and possibly found to have UTI, but was sent for CT of the head on the 21st, which did not show any acute process. On presentation yesterday, she had another CT, which did not show any acute changes. She had a CTA of the head and neck without any significant areas of stenosis or large vessel occlusions. She has undergone an MRI of the brain, which showed an acute left anterior mid pontine infarct. She also has some chronic ischemic disease that is mild. Her family denies any prior history of stroke. There is mention of hypertension in the chart, but the family reports it has been low in the recent past and in fact her hypertensive meds were stopped. She does not have any known hyperlipidemia, diabetes, tobacco use. She is not on antiplatelet therapy or anticoagulation at home. She does have a history of dementia. Daughter reports taking her to her PCP seven or eight years ago for evaluation and they were not told at that time it was dementia, though it was suspected, and then four years ago, there was noticeable decline I believe in association with her son passing away. The patient currently lives with a son and grandson, but her daughter is there much of the time also assisting in her care.   They note the patient loves to eat, she feeds herself, and is very verbal, though it sounds like she may not always answer questions appropriately. She is in a wheelchair at baseline and needs assistance with all of her other ADLs. She is on Seroquel at home and they note that her behavior becomes difficult when she is not taking this. She has been n.p.o. since admission. Due to her altered mental status, she is seeing Speech Therapy at the bedside now that she is more alert. They do note she was swallowing normally. They actually had a what sounds like a modified barium swallow with Speech Therapy consult. In the recent past due to concern she may be aspirating, but she passed the study. Interval 06/25/21:     Family feels like her facial droop has improved. SLP test failed. No events overnight. Review of systems  Unable to obtain     Past Medical History:   Diagnosis Date    Dementia [facility])     As of 6/2021, pt has h/o dementia for approx 8 years, she is verbal and feeds herself at home, she is in a [facility] and needs assistance with ADLs o/w.  Hypertension      History reviewed. No pertinent surgical history. History reviewed. No pertinent family history. Social History     Tobacco Use    Smoking status: Never Smoker    Smokeless tobacco: Never Used   Substance Use Topics    Alcohol use: No      Prior to Admission medications    Medication Sig Start Date End Date Taking? Authorizing Provider   ketoconazole (NIZORAL) 2 % shampoo PLEASE SEE ATTACHED FOR DETAILED DIRECTIONS 3/22/21  Yes Other, MD Corky   acetaminophen (TYLENOL ARTHRITIS PAIN) 650 mg TbER Take 650 mg by mouth daily. Yes Valentino Erickson MD   QUEtiapine (SEROQUEL) 25 mg tablet Take 25 mg by mouth nightly. Yes Valentino Erickson MD   amoxicillin-clavulanate (AUGMENTIN) 500-125 mg per tablet Take 1 Tab by mouth every eight (8) hours.   Patient not taking: Reported on 6/22/2021 9/8/18   Shannen Solo MD     Current Facility-Administered Medications   Medication Dose Route Frequency Provider Last Rate Last Admin    aspirin (ASA) suppository 300 mg  300 mg Rectal DAILY Gerry Sutton MD   300 mg at 21 1202    sodium chloride (NS) flush 5-40 mL  5-40 mL IntraVENous Q8H Joyce Finley MD   10 mL at 219    sodium chloride (NS) flush 5-40 mL  5-40 mL IntraVENous PRN Joyce Finley MD        acetaminophen (TYLENOL) tablet 650 mg  650 mg Oral Q6H PRN Joyce Finley MD        Or    acetaminophen (TYLENOL) suppository 650 mg  650 mg Rectal Q6H PRN Joyce Finley MD        polyethylene glycol (MIRALAX) packet 17 g  17 g Oral DAILY PRN Joyce Finley MD        ondansetron (ZOFRAN ODT) tablet 4 mg  4 mg Oral Q8H PRN Joyce Finley MD        Or    ondansetron (ZOFRAN) injection 4 mg  4 mg IntraVENous Q6H PRN Joyce Finley MD        dextrose 5% infusion  50 mL/hr IntraVENous CONTINUOUS Baljit Simple M, DO 50 mL/hr at 21 1103 50 mL/hr at 21 1103        No Known Allergies    Objective:     Vitals:    21 1956 21 0026 21 0402 21 0605   BP: (!) 174/70 (!) 182/80 (!) 171/71 (!) 136/92   Pulse: 73 82 78 90   Resp: 16 16 17 18   Temp: 98.4 °F (36.9 °C) 97.9 °F (36.6 °C) 98.5 °F (36.9 °C)    SpO2: 96% 97% 98% 98%        Temp (24hrs), Av.1 °F (36.7 °C), Min:97.5 °F (36.4 °C), Max:98.5 °F (36.9 °C)        O2 Device: None (Room air)         Intake/Output Summary (Last 24 hours) at 2021 0746  Last data filed at 2021 6027  Gross per 24 hour   Intake 200 ml   Output 1300 ml   Net -1100 ml       General: In NAD. Abdomen: Soft/NT/non-distended. Extremities. No edema. Neurologic Exam:  Mental Status:  Alert and oriented x self ; Respect Yes to all questions     Language:    Slurred speech     Cranial Nerves:   Pupils equal, round and reactive to light. Visual fields intact.        Extraocular movements intact w/o nystagmus      Facial sensation intact to LT     Right facial weakness      Hearing grossly intact. Motor:    Bulk and tone normal.      5/5 strength on the right, left arm      No pronator drift. No involuntary movements. Sensation:        Coordination & Gait: Gait deferred    LABS:  Recent Labs     06/23/21  0704 06/22/21  1410   WBC 4.9 5.6   HGB 12.5 13.3   HCT 41.9 44.9    220     Recent Labs     06/23/21  0704 06/22/21  1410   * 143   K 3.9 4.2   * 109*   CO2 26 27   BUN 17 18   CREA 0.67 1.02   GLU 56* 75   CA 8.7 9.4     Recent Labs     06/22/21  1410   ALT 26      TBILI 0.6   TP 7.9   ALB 3.6   GLOB 4.3*     No results for input(s): INR, PTP, APTT, INREXT in the last 72 hours. No results for input(s): PHI, PCO2I, PO2I, HCO3I in the last 72 hours.     Recent Labs     06/22/21  1410   TROIQ <0.05     Lab Results   Component Value Date/Time    Cholesterol, total 175 06/23/2021 07:04 AM    HDL Cholesterol 40 06/23/2021 07:04 AM    LDL, calculated 115.2 (H) 06/23/2021 07:04 AM    Triglyceride 99 06/23/2021 07:04 AM    CHOL/HDL Ratio 4.4 06/23/2021 07:04 AM     Lab Results   Component Value Date/Time    Glucose (POC) 101 06/25/2021 06:05 AM    Glucose (POC) 103 06/25/2021 12:12 AM    Glucose (POC) 96 06/24/2021 12:37 PM    Glucose (POC) 87 06/24/2021 06:06 AM    Glucose (POC) 83 06/24/2021 12:30 AM     Lab Results   Component Value Date/Time    Color DARK YELLOW 06/22/2021 03:03 PM    Appearance CLOUDY (A) 06/22/2021 03:03 PM    Specific gravity 1.023 06/22/2021 03:03 PM    Specific gravity 1.025 09/03/2018 11:36 PM    pH (UA) 5.0 06/22/2021 03:03 PM    Protein 30 (A) 06/22/2021 03:03 PM    Glucose Negative 06/22/2021 03:03 PM    Ketone 15 (A) 06/22/2021 03:03 PM    Bilirubin NEGATIVE  09/03/2018 11:36 PM    Urobilinogen 1.0 06/22/2021 03:03 PM    Nitrites Negative 06/22/2021 03:03 PM    Leukocyte Esterase TRACE (A) 06/22/2021 03:03 PM    Epithelial cells FEW 06/22/2021 03:03 PM    Bacteria 3+ (A) 06/22/2021 03:03 PM    WBC 5-10 06/22/2021 03:03 PM RBC 0-5 06/22/2021 03:03 PM       No results for input(s): FE, TIBC, PSAT, FERR in the last 72 hours. No results found for: FOL, RBCF   No results for input(s): PH, PCO2, PO2 in the last 72 hours. Recent Labs     06/22/21  1410   TROIQ <0.05       Imaging:  No results found. CT Results:  Results from Hospital Encounter encounter on 06/22/21    CTA NECK    Narrative  Clinical indication: Facial asymmetry. CTA of the head and neck obtained after intravenous injection 100 cc of  Isovue-370 with review of the raw data and MIP reconstructions. No prior. CT dose reduction was achieved through the use of a standardized  protocol tailored for this examination and automatic exposure control for dose  modulation . Stann Cassis There is no enhancing lesion or masses her. NASCET criteria. Origin from the thoracic arch show no significant findings. The vertebral arteries in the neck appear patent. The carotid bifurcation show no significant stenosis. Intracranially there is no  large vessel protrusion filling defects stenosis or vascular malformation. Impression  No significant lesion demonstrated. CTA HEAD    Narrative  Clinical indication: Facial asymmetry. CTA of the head and neck obtained after intravenous injection 100 cc of  Isovue-370 with review of the raw data and MIP reconstructions. No prior. CT dose reduction was achieved through the use of a standardized  protocol tailored for this examination and automatic exposure control for dose  modulation . Stann Cassis There is no enhancing lesion or masses her. NASCET criteria. Origin from the thoracic arch show no significant findings. The vertebral arteries in the neck appear patent. The carotid bifurcation show no significant stenosis. Intracranially there is no  large vessel protrusion filling defects stenosis or vascular malformation. Impression  No significant lesion demonstrated.       CT HEAD WO CONT    Narrative  EXAM: CT HEAD WO CONT    INDICATION: Suspect completed stroke, left upper is contracted, there is a left  facial droop    COMPARISON: June 21. CONTRAST: None. TECHNIQUE: Unenhanced CT of the head was performed using 5 mm images. Brain and  bone windows were generated. Coronal and sagittal reformats. CT dose reduction  was achieved through use of a standardized protocol tailored for this  examination and automatic exposure control for dose modulation. FINDINGS:  No extra-axial fluid collection hemorrhage shift or masses. Atrophy and white  matter changes. Impression  No acute findings. MRI Results:  Results from Hospital Encounter encounter on 06/22/21    MRI BRAIN WO CONT    Narrative  EXAM:  MRI BRAIN WO CONT  INDICATION:  CVA, patient presented with acute on set of increased somnolence  and altered neurologic/mental status. Left facial droop. Left upper extremity  stiffening. Patient baseline dementia. TECHNIQUE:  Sagittal T1, axial FLAIR, T2,T1 and gradient echo images as well as coronal T2  weighted images and axial diffusion weighted images of the head were obtained. COMPARISON:  CT and CTA head and neck. FINDINGS:  There is focal T2 hyperintensity and restricted diffusion in the left anterior  central marcela consistent with acute infarction. Mild associated T2 hyperintensity in the other portions of the marcela and cerebral  white matter in a pattern suggesting mild chronic small vessel ischemic change. There are no other areas of abnormal restricted diffusion/acute/subacute  infarction demonstrated. The ventricular size and configuration are within normal limits. No evidence of hemorrhage, mass or abnormal extra-axial fluid collections. Flow voids are present in the vertebral basilar and carotid artery systems. The craniocervical junction is unremarkable. The structures at the cranial base including paranasal sinuses are unremarkable. Impression  1. Acute left anterior mid marcela infarct.   2. Evidence of mild chronic small vessel ischemic change. XR Results   Results from [place] encounter on 21    XR CHEST PORT    Impression  No acute changes. Results from Hospital Encounter encounter on 19    XR BA SWALLOW ESOPHOGRAM    Impression  impression: Small hiatal hernia, abnormal peristalsis, mild narrowing of the  gastroesophageal junction. 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 Focal nonspecific coarse  soft tissue calcification at the medial and posterior aspect of the proximal  lower leg      VAS/US Results (maximum last 3): Results from [place] encounter on 18    DUPLEX LOWER EXT [address]   FINAL REPORT     Name: Kathi Moreno  MRN: [id number]  : 08 Aug 1927  HIS Order #: [id number]  [address] [facility] Visit #: [id number]  Date: 03 Sep 2018    TYPE OF TEST: Peripheral Venous Testing    REASON FOR TEST  Pain in limb, Limb swelling    Right Leg:-  Deep venous thrombosis:           No  Superficial venous thrombosis:    No  Deep venous insufficiency:        Not examined  Superficial venous insufficiency: Not examined      INTERPRETATION/FINDINGS  PROCEDURE:  Color duplex ultrasound imaging of lower extremity veins. FINDINGS:  Right: The common femoral, deep femoral, femoral, popliteal,  posterior tibial, peroneal, and great saphenous are patent and without  evidence of thrombus; each is is fully compressible and there is no  narrowing of the flow channel on color Doppler imaging. Phasic flow  is observed in the common femoral vein. Left:   The common femoral vein is patent and without evidence of  thrombus. Phasic flow is observed. This extremity was not otherwise  evaluated. Impression  :  No evidence of right lower extremity vein thrombosis.     ADDITIONAL COMMENTS  There is an incidental finding of a popliteal fossa fluid collection. I have personally reviewed the data relevant to the interpretation of  this  study. TECHNOLOGIST: SHOSHANA Martinez RCS  Signed: 09/03/2018 11:20 PM    PHYSICIAN: Khris Tapia MD  Signed: 09/04/2018 07:16 AM      TTE  PENDING      EKG  Results for orders placed or performed during the hospital encounter of 06/22/21   EKG, 12 LEAD, INITIAL   Result Value Ref Range    Ventricular Rate 60 BPM    Atrial Rate 60 BPM    P-R Interval 118 ms    QRS Duration 54 ms    Q-T Interval 462 ms    QTC Calculation (Bezet) 462 ms    Calculated P Axis 65 degrees    Calculated R Axis 37 degrees    Calculated T Axis 29 degrees    Diagnosis       Normal sinus rhythm  When compared with ECG of 03-SEP-2018 21:05,  T wave inversion no longer evident in Anterior leads  Confirmed by Robert Bates ([id number]) on 6/23/2021 10:37:31 AM         Hospital Problems  Date Reviewed: 9/4/2018        Codes Class Noted POA    FTT (failure to thrive) in adult ICD-10-CM: R62.7  ICD-9-CM: 783.7  6/22/2021 Unknown        CVA (cerebral vascular accident) [facility]) ICD-10-CM: I63.9  ICD-9-CM: 434.91  6/22/2021 Unknown              Assessment/Plan:     Sarthak Ramírez is a 80 y.o. female with a history of dementia and HTN is now admitted for an acute left pontine infarction revealed on MRI. On examination, she exhibits left side facial droop, which has improved slightly since admissionm,per family. She is alert to herself, which is her baseline.  LDL is 115.5, start Lipitor 40 mg nightly if it is deemed safe to take oral meds. Continue taking 300 mg of aspirin per rectal daily until she is cleared for a p.o. A1c of 4.8, which is the goal. She failed the SLP evaluation . I recommend  proceeding with a modified barium swallow. She had one completed on 8/14/2019, which indicated a minor hiatal hernia, irregular peristalsis, and slight gastroesophageal junction narrowing.  Palliative has taken part and has discussed options with the family if swallowing does not improve. A peg tube would be desired by the family. Thank you for allowing the Neurology Service the pleasure of participating in the care of your patient. This patient will be discussed with my collaborating care team physician  and she may have further recommendations regarding this patient's care.        Signed By: Catalina Starr NP     June 25, 2021 7:46 AM

## 2021-06-26 LAB
GLUCOSE BLD STRIP.AUTO-MCNC: 150 MG/DL (ref 65–117)
SERVICE CMNT-IMP: ABNORMAL

## 2021-06-26 PROCEDURE — 65270000029 HC RM PRIVATE

## 2021-06-26 PROCEDURE — 74011250636 HC RX REV CODE- 250/636: Performed by: NURSE PRACTITIONER

## 2021-06-26 PROCEDURE — 82962 GLUCOSE BLOOD TEST: CPT

## 2021-06-26 PROCEDURE — 74011250637 HC RX REV CODE- 250/637: Performed by: PSYCHIATRY & NEUROLOGY

## 2021-06-26 PROCEDURE — 74011250636 HC RX REV CODE- 250/636: Performed by: HOSPITALIST

## 2021-06-26 PROCEDURE — 74011250636 HC RX REV CODE- 250/636: Performed by: INTERNAL MEDICINE

## 2021-06-26 PROCEDURE — 74011250637 HC RX REV CODE- 250/637: Performed by: HOSPITALIST

## 2021-06-26 RX ORDER — GUAIFENESIN 100 MG/5ML
81 LIQUID (ML) ORAL DAILY
Qty: 30 TABLET | Refills: 0 | Status: SHIPPED | OUTPATIENT
Start: 2021-06-26 | End: 2021-07-26

## 2021-06-26 RX ORDER — GUAIFENESIN 100 MG/5ML
81 LIQUID (ML) ORAL DAILY
Status: DISCONTINUED | OUTPATIENT
Start: 2021-06-27 | End: 2021-06-28 | Stop reason: HOSPADM

## 2021-06-26 RX ORDER — QUETIAPINE FUMARATE 25 MG/1
25 TABLET, FILM COATED ORAL
Status: DISCONTINUED | OUTPATIENT
Start: 2021-06-26 | End: 2021-06-28 | Stop reason: HOSPADM

## 2021-06-26 RX ADMIN — HYDRALAZINE HYDROCHLORIDE 20 MG: 20 INJECTION INTRAMUSCULAR; INTRAVENOUS at 15:58

## 2021-06-26 RX ADMIN — HALOPERIDOL LACTATE 3 MG: 5 INJECTION, SOLUTION INTRAMUSCULAR at 04:27

## 2021-06-26 RX ADMIN — ASPIRIN 300 MG: 300 SUPPOSITORY RECTAL at 09:42

## 2021-06-26 RX ADMIN — HALOPERIDOL LACTATE 3 MG: 5 INJECTION, SOLUTION INTRAMUSCULAR at 09:37

## 2021-06-26 RX ADMIN — DEXTROSE MONOHYDRATE 50 ML/HR: 50 INJECTION, SOLUTION INTRAVENOUS at 14:29

## 2021-06-26 NOTE — PROGRESS NOTES
Bedside shift change report given to Tenisha Melo (oncoming nurse) by Ghazal Ureña RN (offgoing nurse). Report included the following information SBAR and Kardex.

## 2021-06-26 NOTE — PROGRESS NOTES
Problem: Falls - Risk of  Goal: *Absence of Falls  Description: Document Bowen Schimke Fall Risk and appropriate interventions in the flowsheet. Outcome: Progressing Towards Goal  Note: Fall Risk Interventions:  Mobility Interventions: Bed/chair exit alarm, Patient to call before getting OOB    Mentation Interventions: Bed/chair exit alarm    Medication Interventions: Bed/chair exit alarm, Utilize gait belt for transfers/ambulation    Elimination Interventions: Bed/chair exit alarm, Call light in reach              Problem: Pressure Injury - Risk of  Goal: *Prevention of pressure injury  Description: Document Rivas Scale and appropriate interventions in the flowsheet.   Outcome: Progressing Towards Goal  Note: Pressure Injury Interventions:  Sensory Interventions: Keep linens dry and wrinkle-free, Pressure redistribution bed/mattress (bed type)    Moisture Interventions: Absorbent underpads, Apply protective barrier, creams and emollients    Activity Interventions: Increase time out of bed, Pressure redistribution bed/mattress(bed type)    Mobility Interventions: Pressure redistribution bed/mattress (bed type)    Nutrition Interventions: Document food/fluid/supplement intake    Friction and Shear Interventions: HOB 30 degrees or less, Lift sheet, Lift team/patient mobility team

## 2021-06-26 NOTE — PROGRESS NOTES
Patient resting in bed, was very agitated this morning. Haldol given. Problem: Patient Education: Go to Patient Education Activity  Goal: Patient/Family Education  Outcome: Progressing Towards Goal     Problem: Falls - Risk of  Goal: *Absence of Falls  Description: Document Shirley Keyana Fall Risk and appropriate interventions in the flowsheet. Outcome: Progressing Towards Goal  Note: Fall Risk Interventions:  Mobility Interventions: Communicate number of staff needed for ambulation/transfer, Bed/chair exit alarm    Mentation Interventions: Bed/chair exit alarm, Adequate sleep, hydration, pain control    Medication Interventions: Evaluate medications/consider consulting pharmacy, Bed/chair exit alarm    Elimination Interventions: Toileting schedule/hourly rounds, Call light in reach, Bed/chair exit alarm              Problem: Patient Education: Go to Patient Education Activity  Goal: Patient/Family Education  Outcome: Progressing Towards Goal     Problem: Pressure Injury - Risk of  Goal: *Prevention of pressure injury  Description: Document Rivas Scale and appropriate interventions in the flowsheet.   Outcome: Progressing Towards Goal  Note: Pressure Injury Interventions:  Sensory Interventions: Keep linens dry and wrinkle-free, Float heels, Check visual cues for pain, Minimize linen layers    Moisture Interventions: Apply protective barrier, creams and emollients, Absorbent underpads    Activity Interventions: Pressure redistribution bed/mattress(bed type)    Mobility Interventions: HOB 30 degrees or less, Pressure redistribution bed/mattress (bed type), Float heels    Nutrition Interventions: Offer support with meals,snacks and hydration    Friction and Shear Interventions: HOB 30 degrees or less, Lift sheet, Minimize layers                Problem: Patient Education: Go to Patient Education Activity  Goal: Patient/Family Education  Outcome: Progressing Towards Goal

## 2021-06-26 NOTE — PROGRESS NOTES
Bedside shift change report given to [address] (oncoming nurse) by Swetha Lance RN  (offgoing nurse). Report included the following information SBAR, Kardex, MAR and Recent Results.

## 2021-06-26 NOTE — DISCHARGE INSTRUCTIONS
Discharge Instructions       PATIENT ID: Bebeto Wolff  MRN: [id number]   YOB: 1927    DATE OF ADMISSION: 6/22/2021  1:52 PM    DATE OF DISCHARGE: 6/26/2021    PRIMARY CARE PROVIDER: Darry Phalen, MD     ATTENDING PHYSICIAN: Kathleen Morales MD  DISCHARGING PROVIDER: Ashlee Solis MD    To contact this individual call [phone] and ask the  to page. If unavailable ask to be transferred the Adult Hospitalist Department. DISCHARGE DIAGNOSES CVA/ FTT/ Dementia    CONSULTATIONS: ED CONSULT TO SENIOR SERVICES NURSE PRACTITIONER  IP CONSULT TO PALLIATIVE CARE - PROVIDER  IP CONSULT TO NEUROLOGY  IP CONSULT TO GASTROENTEROLOGY  IP CONSULT TO HOSPITALIST    PROCEDURES/SURGERIES: * No surgery found *    PENDING TEST RESULTS:   At the time of discharge the following test results are still pending:     FOLLOW UP APPOINTMENTS:   Follow-up Information     Follow up With Specialties Details Why Contact Info    Darry Phalen, MD Family Medicine In 1 week  [address] [place] [address] [phone]             ADDITIONAL CARE RECOMMENDATIONS:     DIET: Dysphagia diet-pureed    ACTIVITY: Activity as tolerated    WOUND CARE:     EQUIPMENT needed:       Radiology      DISCHARGE MEDICATIONS:   See Medication Reconciliation Form    · It is important that you take the medication exactly as they are prescribed. · Keep your medication in the bottles provided by the pharmacist and keep a list of the medication names, dosages, and times to be taken in your wallet. · Do not take other medications without consulting your doctor. NOTIFY YOUR PHYSICIAN FOR ANY OF THE FOLLOWING:   Fever over 101 degrees for 24 hours. Chest pain, shortness of breath, fever, chills, nausea, vomiting, diarrhea, change in mentation, falling, weakness, bleeding. Severe pain or pain not relieved by medications. Or, any other signs or symptoms that you may have questions about.       DISPOSITION: Home With:   OT  PT  [facility]  RN       SNF/Inpatient Rehab/LTAC    Independent/assisted living   x Hospice home    Other:     CDMP Checked:   Yes x     PROBLEM LIST Updated:  Yes x       Signed:   Dinesh Pineda MD  6/26/2021  12:13 PM

## 2021-06-26 NOTE — PROGRESS NOTES
[facility] [facility] Adult  Hospitalist Group                                                                                          Hospitalist Progress Note  Isatu Saravia MD  Answering service: [phone] from in house phone        Date of Service:  2021  NAME:  Ijeoma Galarza  :  1927  MRN:  [id number]      Admission Summary:   80 y.o. female w/ pmh of htn and dementia who is brought to ER by family for increasing debility and refusal to eat or drink. History is provided by family at bedside as patient is unable to do so given her advanced dementia. Family states patient had been in her baseline health until about 3-1/2 days ago when they noticed a progressive decline in her mentation as well as p.o. tolerance. 24 hours prior to ER presentation, family state that they noted her right facial droop she was seen at [facility] 2 days ago and was discharged back to home. Additionally, family states patient's PCP was consulted and prescribed an antibiotic for presumed UTI which was never started.       Interval history / Subjective:   Patient seen and examined   More alert SLP - Continue puree with mildly (nectar) thick liquids at home  Pall care following now decision for home with hospice      Assessment & Plan:     Acute CVA  FTT / Progressive Dementia  -presenting with cognitive decline, poor oral intake, right facial droop   -MRI  - 1. Acute left anterior mid marcela infarct. 2. Evidence of mild chronic small vessel ischemic change.   -SLP, - failed need PEG if want to peruse feeding   -Unable to participate in PT/OT at this time- has full time care givers at home  -Continue puree with mildly (nectar) thick liquids at home  -Palliative care consulted  And I d/w PCP as well   - home with hospice planned     Hypoglycemia:   Hypernatremia:   -suspect due to decreased oral intake  -na 146     Low TSH:   - T4 within normal limit     Hypertension  -As needed hydralazine as unable to swallow      Dementia  -Seroquel resume PRN haldol     Possible Acute cystitis  -Urinalysis equivocal   -continue Rocephin 1 g daily pending cultures   -Follow blood and urine cultures     Intravascular volume depletion  -Continue MIVF     Code status: Full. Palliative care consulted   DVT prophylaxis: SCDs    Care Plan discussed with: Patient/Family  Anticipated Disposition: Home w/Family  Anticipated Discharge: home hospice     Hospital Problems  Date Reviewed: 9/4/2018        Codes Class Noted POA    FTT (failure to thrive) in adult ICD-10-CM: R62.7  ICD-9-CM: 783.7  6/22/2021 Unknown        CVA (cerebral vascular accident) [facility]) ICD-10-CM: I63.9  ICD-9-CM: 434.91  6/22/2021 Unknown                Review of Systems:   Unable to obtain     XR SWALLOW FUNC VIDEO    Result Date: 6/25/2021  1. Severely delayed oral phase of swallowing. 2. Aspiration with thin liquid. MRI BRAIN WO CONT    Result Date: 6/23/2021  1. Acute left anterior mid marcela infarct. 2. Evidence of mild chronic small vessel ischemic change. CT HEAD WO CONT    Result Date: 6/22/2021  No acute findings. CTA HEAD    Result Date: 6/22/2021  No significant lesion demonstrated. CTA NECK    Result Date: 6/22/2021  No significant lesion demonstrated. XR CHEST PORT    Result Date: 6/22/2021  No acute changes. Vital Signs:    Last 24hrs VS reviewed since prior progress note.  Most recent are:  Visit Vitals  BP (!) 193/81 (BP 1 Location: Left arm, BP Patient Position: At rest)   Pulse (!) 101   Temp 97.8 °F (36.6 °C)   Resp 16   Ht 5' (1.524 m)   Wt 61.3 kg (135 lb 2.3 oz)   SpO2 99%   BMI 26.39 kg/m²         Intake/Output Summary (Last 24 hours) at 6/26/2021 1208  Last data filed at 6/26/2021 6285  Gross per 24 hour   Intake    Output 1500 ml   Net -1500 ml        Physical Examination:     I had a face to face encounter with this patient and independently examined them on 6/26/2021 as outlined below:          Constitutional:  No acute distress, elderly, poor functional state does not respond very well   ENT:  Mucous membrane moist   Resp:  CTA bilaterally. CV:  bradycardia, no murmurs, gallops, rubs    GI:  Soft, non distended, non tender. normoactive bowel sounds, no hepatosplenomegaly     Musculoskeletal:  No edema, warm, 2+ pulses throughout    Neurologic:  right sided facial droop, unable to follow commands, right upper extremity appears more relaxed compared to left- patient unable to participate in exam             Data Review:    Review and/or order of clinical lab test  Review and/or order of tests in the radiology section of CPT  Review and/or order of tests in the medicine section of CPT      Labs:     No results for input(s): WBC, HGB, HCT, PLT, HGBEXT, HCTEXT, PLTEXT, HGBEXT, HCTEXT, PLTEXT in the last 72 hours. No results for input(s): NA, K, CL, CO2, BUN, CREA, GLU, CA, MG, PHOS, URICA in the last 72 hours. No results for input(s): ALT, AP, TBIL, TBILI, TP, ALB, GLOB, GGT, AML, LPSE in the last 72 hours. No lab exists for component: SGOT, GPT, AMYP, HLPSE  No results for input(s): INR, PTP, APTT, INREXT, INREXT in the last 72 hours. No results for input(s): FE, TIBC, PSAT, FERR in the last 72 hours. No results found for: FOL, RBCF   No results for input(s): PH, PCO2, PO2 in the last 72 hours. No results for input(s): CPK, CKNDX, TROIQ in the last 72 hours.     No lab exists for component: CPKMB  Lab Results   Component Value Date/Time    Cholesterol, total 175 06/23/2021 07:04 AM    HDL Cholesterol 40 06/23/2021 07:04 AM    LDL, calculated 115.2 (H) 06/23/2021 07:04 AM    Triglyceride 99 06/23/2021 07:04 AM    CHOL/HDL Ratio 4.4 06/23/2021 07:04 AM     Lab Results   Component Value Date/Time    Glucose (POC) 150 (H) 06/26/2021 08:08 AM    Glucose (POC) 102 06/25/2021 12:10 PM    Glucose (POC) 101 06/25/2021 06:05 AM    Glucose (POC) 103 06/25/2021 12:12 AM    Glucose (POC) 96 06/24/2021 12:37 PM     Lab Results   Component Value Date/Time    Color DARK YELLOW 06/22/2021 03:03 PM    Appearance CLOUDY (A) 06/22/2021 03:03 PM    Specific gravity 1.023 06/22/2021 03:03 PM    Specific gravity 1.025 09/03/2018 11:36 PM    pH (UA) 5.0 06/22/2021 03:03 PM    Protein 30 (A) 06/22/2021 03:03 PM    Glucose Negative 06/22/2021 03:03 PM    Ketone 15 (A) 06/22/2021 03:03 PM    Bilirubin NEGATIVE  09/03/2018 11:36 PM    Urobilinogen 1.0 06/22/2021 03:03 PM    Nitrites Negative 06/22/2021 03:03 PM    Leukocyte Esterase TRACE (A) 06/22/2021 03:03 PM    Epithelial cells FEW 06/22/2021 03:03 PM    Bacteria 3+ (A) 06/22/2021 03:03 PM    WBC 5-10 06/22/2021 03:03 PM    RBC 0-5 06/22/2021 03:03 PM         Medications Reviewed:     Current Facility-Administered Medications   Medication Dose Route Frequency    hydrALAZINE (APRESOLINE) 20 mg/mL injection 20 mg  20 mg IntraVENous Q6H PRN    haloperidol lactate (HALDOL) injection 3 mg  3 mg IntraVENous Q4H PRN    aspirin (ASA) suppository 300 mg  300 mg Rectal DAILY    sodium chloride (NS) flush 5-40 mL  5-40 mL IntraVENous Q8H    sodium chloride (NS) flush 5-40 mL  5-40 mL IntraVENous PRN    acetaminophen (TYLENOL) tablet 650 mg  650 mg Oral Q6H PRN    Or    acetaminophen (TYLENOL) suppository 650 mg  650 mg Rectal Q6H PRN    polyethylene glycol (MIRALAX) packet 17 g  17 g Oral DAILY PRN    ondansetron (ZOFRAN ODT) tablet 4 mg  4 mg Oral Q8H PRN    Or    ondansetron (ZOFRAN) injection 4 mg  4 mg IntraVENous Q6H PRN    dextrose 5% infusion  50 mL/hr IntraVENous CONTINUOUS     ______________________________________________________________________  EXPECTED LENGTH OF STAY: 2d 21h  ACTUAL LENGTH OF STAY:          4                 Denise Wu MD

## 2021-06-27 LAB
BACTERIA SPEC CULT: NORMAL
GLUCOSE BLD STRIP.AUTO-MCNC: 81 MG/DL (ref 65–117)
SERVICE CMNT-IMP: NORMAL
SERVICE CMNT-IMP: NORMAL

## 2021-06-27 PROCEDURE — 74011250636 HC RX REV CODE- 250/636: Performed by: INTERNAL MEDICINE

## 2021-06-27 PROCEDURE — 65270000032 HC RM SEMIPRIVATE

## 2021-06-27 PROCEDURE — 74011250637 HC RX REV CODE- 250/637: Performed by: HOSPITALIST

## 2021-06-27 PROCEDURE — 82962 GLUCOSE BLOOD TEST: CPT

## 2021-06-27 RX ADMIN — DEXTROSE MONOHYDRATE 50 ML/HR: 50 INJECTION, SOLUTION INTRAVENOUS at 17:13

## 2021-06-27 RX ADMIN — ASPIRIN 81 MG: 81 TABLET, CHEWABLE ORAL at 11:06

## 2021-06-27 NOTE — PROGRESS NOTES
Problem: Falls - Risk of  Goal: *Absence of Falls  Description: Document Srinivasan [place] Fall Risk and appropriate interventions in the flowsheet. Outcome: Progressing Towards Goal  Note: Fall Risk Interventions:  Mobility Interventions: Bed/chair exit alarm, Communicate number of staff needed for ambulation/transfer    Mentation Interventions: Adequate sleep, hydration, pain control, Bed/chair exit alarm    Medication Interventions: Bed/chair exit alarm    Elimination Interventions: Bed/chair exit alarm              Problem: Pressure Injury - Risk of  Goal: *Prevention of pressure injury  Description: Document Rivas Scale and appropriate interventions in the flowsheet.   Outcome: Progressing Towards Goal  Note: Pressure Injury Interventions:  Sensory Interventions: Keep linens dry and wrinkle-free    Moisture Interventions: Absorbent underpads, Apply protective barrier, creams and emollients    Activity Interventions: Pressure redistribution bed/mattress(bed type)    Mobility Interventions: Pressure redistribution bed/mattress (bed type)    Nutrition Interventions: Document food/fluid/supplement intake, Offer support with meals,snacks and hydration    Friction and Shear Interventions: HOB 30 degrees or less, Lift sheet, Minimize layers

## 2021-06-27 NOTE — PROGRESS NOTES
TRANSFER - OUT REPORT:    Verbal report given to Mariana Pulido (name) on Durene Life  being transferred to [facility] [address] (unit) for routine progression of care       Report consisted of patients Situation, Background, Assessment and   Recommendations(SBAR). Information from the following report(s) SBAR, Kardex, [facility] and Recent Results was reviewed with the receiving nurse. Lines:   Peripheral IV 06/25/21 Left Forearm (Active)   Site Assessment Clean, dry, & intact 06/27/21 1106   Phlebitis Assessment 0 06/27/21 1106   Infiltration Assessment 0 06/27/21 1106   Dressing Status Clean, dry, & intact 06/27/21 1106   Dressing Type Transparent 06/27/21 1106   Hub Color/Line Status Blue; Infusing 06/27/21 1106   Alcohol Cap Used Yes 06/27/21 1106        Opportunity for questions and clarification was provided. Patient transported with:transport and belongings. Family aware of change of room.      Tech

## 2021-06-27 NOTE — PROGRESS NOTES
Bedside shift change report given to [address] (oncoming nurse) by Dhaval Fitch RN  (offgoing nurse). Report included the following information SBAR, Kardex, MAR and Recent Results.

## 2021-06-27 NOTE — PROGRESS NOTES
Bedside shift change report given to Bren Alaniz (oncoming nurse) by Harris Cronin RN (offgoing nurse). Report included the following information SBAR and Kardex.

## 2021-06-27 NOTE — PROGRESS NOTES
Problem: Falls - Risk of  Goal: *Absence of Falls  Description: Document [place] Fall Risk and appropriate interventions in the flowsheet. Outcome: Progressing Towards Goal  Note: Fall Risk Interventions:  Mobility Interventions: Communicate number of staff needed for ambulation/transfer    Mentation Interventions: Adequate sleep, hydration, pain control    Medication Interventions: Evaluate medications/consider consulting pharmacy    Elimination Interventions: Bed/chair exit alarm              Problem: Pressure Injury - Risk of  Goal: *Prevention of pressure injury  Description: Document Rivas Scale and appropriate interventions in the flowsheet.   Outcome: Progressing Towards Goal  Note: Pressure Injury Interventions:  Sensory Interventions: Assess changes in LOC    Moisture Interventions: Internal/External urinary devices    Activity Interventions: Pressure redistribution bed/mattress(bed type)    Mobility Interventions: HOB 30 degrees or less    Nutrition Interventions: Offer support with meals,snacks and hydration    Friction and Shear Interventions: HOB 30 degrees or less

## 2021-06-27 NOTE — PROGRESS NOTES
[facility] Adult  Hospitalist Group                                                                                          Hospitalist Progress Note  Omar Garcia MD  Answering service: [phone] from in house phone        Date of Service:  2021  NAME:  Keena Hendrix  :  1927  MRN:  [id number]      Admission Summary:   80 y.o. female w/ pmh of htn and dementia who is brought to ER by family for increasing debility and refusal to eat or drink. History is provided by family at bedside as patient is unable to do so given her advanced dementia. Family states patient had been in her baseline health until about 3-1/2 days ago when they noticed a progressive decline in her mentation as well as p.o. tolerance. 24 hours prior to ER presentation, family state that they noted her right facial droop she was seen at [facility] 2 days ago and was discharged back to home. Additionally, family states patient's PCP was consulted and prescribed an antibiotic for presumed UTI which was never started.       Interval history / Subjective:     More alert SLP - Continue puree with mildly (nectar) thick liquids at home  Pall care following now decision for home with hospice   Awaiting hospital bed etc ready for d/c     Assessment & Plan:     Acute CVA  FTT / Progressive Dementia  -presenting with cognitive decline, poor oral intake, right facial droop   -MRI  - 1. Acute left anterior mid marcela infarct. 2. Evidence of mild chronic small vessel ischemic change.   -SLP, - failed need PEG if want to peruse feeding   -Unable to participate in PT/OT at this time- has full time care givers at home  -Continue puree with mildly (nectar) thick liquids at home  -Palliative care consulted  And I d/w PCP as well   - home with hospice planned     Hypoglycemia:   Hypernatremia:   -suspect due to decreased oral intake  -na 146     Low TSH:   - T4 within normal limit     Hypertension  -As needed hydralazine as unable to swallow      Dementia  -Seroquel resume PRN haldol     Possible Acute cystitis  -Urinalysis equivocal   -continue Rocephin 1 g daily pending cultures   -Follow blood and urine cultures     Intravascular volume depletion  -Continue MIVF     Code status: Full. Palliative care consulted   DVT prophylaxis: SCDs    Care Plan discussed with: Patient/Family  Anticipated Disposition: Home w/Family  Anticipated Discharge: home hospice     Hospital Problems  Date Reviewed: 9/4/2018        Codes Class Noted POA    FTT (failure to thrive) in adult ICD-10-CM: R62.7  ICD-9-CM: 783.7  6/22/2021 Unknown        CVA (cerebral vascular accident) [facility]) ICD-10-CM: I63.9  ICD-9-CM: 434.91  6/22/2021 Unknown                Review of Systems:   Unable to obtain     XR SWALLOW FUNC VIDEO    Result Date: 6/25/2021  1. Severely delayed oral phase of swallowing. 2. Aspiration with thin liquid. MRI BRAIN WO CONT    Result Date: 6/23/2021  1. Acute left anterior mid marcela infarct. 2. Evidence of mild chronic small vessel ischemic change. CT HEAD WO CONT    Result Date: 6/22/2021  No acute findings. CTA HEAD    Result Date: 6/22/2021  No significant lesion demonstrated. CTA NECK    Result Date: 6/22/2021  No significant lesion demonstrated. XR CHEST PORT    Result Date: 6/22/2021  No acute changes. Vital Signs:    Last 24hrs VS reviewed since prior progress note.  Most recent are:  Visit Vitals  /70 (BP 1 Location: Right arm, BP Patient Position: At rest)   Pulse 80   Temp 98.3 °F (36.8 °C)   Resp 16   Ht 5' (1.524 m)   Wt 61.3 kg (135 lb 2.3 oz)   SpO2 95%   BMI 26.39 kg/m²       No intake or output data in the 24 hours ending 06/27/21 0908     Physical Examination:     I had a face to face encounter with this patient and independently examined them on 6/27/2021 as outlined below:          Constitutional:  No acute distress, elderly, poor functional state does not respond very well ENT:  Mucous membrane moist   Resp:  CTA bilaterally. CV:  bradycardia, no murmurs, gallops, rubs    GI:  Soft, non distended, non tender. normoactive bowel sounds, no hepatosplenomegaly     Musculoskeletal:  No edema, warm, 2+ pulses throughout    Neurologic:  right sided facial droop, unable to follow commands, right upper extremity appears more relaxed compared to left- patient unable to participate in exam             Data Review:    Review and/or order of clinical lab test  Review and/or order of tests in the radiology section of CPT  Review and/or order of tests in the medicine section of CPT      Labs:     No results for input(s): WBC, HGB, HCT, PLT, HGBEXT, HCTEXT, PLTEXT, HGBEXT, HCTEXT, PLTEXT in the last 72 hours. No results for input(s): NA, K, CL, CO2, BUN, CREA, GLU, CA, MG, PHOS, URICA in the last 72 hours. No results for input(s): ALT, AP, TBIL, TBILI, TP, ALB, GLOB, GGT, AML, LPSE in the last 72 hours. No lab exists for component: SGOT, GPT, AMYP, HLPSE  No results for input(s): INR, PTP, APTT, INREXT, INREXT in the last 72 hours. No results for input(s): FE, TIBC, PSAT, FERR in the last 72 hours. No results found for: FOL, RBCF   No results for input(s): PH, PCO2, PO2 in the last 72 hours. No results for input(s): CPK, CKNDX, TROIQ in the last 72 hours.     No lab exists for component: CPKMB  Lab Results   Component Value Date/Time    Cholesterol, total 175 06/23/2021 07:04 AM    HDL Cholesterol 40 06/23/2021 07:04 AM    LDL, calculated 115.2 (H) 06/23/2021 07:04 AM    Triglyceride 99 06/23/2021 07:04 AM    CHOL/HDL Ratio 4.4 06/23/2021 07:04 AM     Lab Results   Component Value Date/Time    Glucose (POC) 150 (H) 06/26/2021 08:08 AM    Glucose (POC) 102 06/25/2021 12:10 PM    Glucose (POC) 101 06/25/2021 06:05 AM    Glucose (POC) 103 06/25/2021 12:12 AM    Glucose (POC) 96 06/24/2021 12:37 PM     Lab Results   Component Value Date/Time    Color DARK YELLOW 06/22/2021 03:03 PM Appearance CLOUDY (A) 06/22/2021 03:03 PM    Specific gravity 1.023 06/22/2021 03:03 PM    Specific gravity 1.025 09/03/2018 11:36 PM    pH (UA) 5.0 06/22/2021 03:03 PM    Protein 30 (A) 06/22/2021 03:03 PM    Glucose Negative 06/22/2021 03:03 PM    Ketone 15 (A) 06/22/2021 03:03 PM    Bilirubin NEGATIVE  09/03/2018 11:36 PM    Urobilinogen 1.0 06/22/2021 03:03 PM    Nitrites Negative 06/22/2021 03:03 PM    Leukocyte Esterase TRACE (A) 06/22/2021 03:03 PM    Epithelial cells FEW 06/22/2021 03:03 PM    Bacteria 3+ (A) 06/22/2021 03:03 PM    WBC 5-10 06/22/2021 03:03 PM    RBC 0-5 06/22/2021 03:03 PM         Medications Reviewed:     Current Facility-Administered Medications   Medication Dose Route Frequency    aspirin chewable tablet 81 mg  81 mg Oral DAILY    QUEtiapine (SEROquel) tablet 25 mg  25 mg Oral QHS    hydrALAZINE (APRESOLINE) 20 mg/mL injection 20 mg  20 mg IntraVENous Q6H PRN    haloperidol lactate (HALDOL) injection 3 mg  3 mg IntraVENous Q4H PRN    sodium chloride (NS) flush 5-40 mL  5-40 mL IntraVENous Q8H    sodium chloride (NS) flush 5-40 mL  5-40 mL IntraVENous PRN    acetaminophen (TYLENOL) tablet 650 mg  650 mg Oral Q6H PRN    Or    acetaminophen (TYLENOL) suppository 650 mg  650 mg Rectal Q6H PRN    polyethylene glycol (MIRALAX) packet 17 g  17 g Oral DAILY PRN    ondansetron (ZOFRAN ODT) tablet 4 mg  4 mg Oral Q8H PRN    Or    ondansetron (ZOFRAN) injection 4 mg  4 mg IntraVENous Q6H PRN    dextrose 5% infusion  50 mL/hr IntraVENous CONTINUOUS     ______________________________________________________________________  EXPECTED LENGTH OF STAY: 2d 21h  ACTUAL LENGTH OF STAY:          5                 Perla Wagner MD

## 2021-06-27 NOTE — PROGRESS NOTES
TRANSFER - IN REPORT:    Verbal report received from NI Guthrie(name) on Omar Avalos  being received from [facility](unit) for routine progression of care      Report consisted of patients Situation, Background, Assessment and   Recommendations(SBAR). Information from the following report(s) SBAR, Kardex and Intake/Output was reviewed with the receiving nurse. Opportunity for questions and clarification was provided. Assessment completed upon patients arrival to unit and care assumed.

## 2021-06-28 VITALS
TEMPERATURE: 97.9 F | OXYGEN SATURATION: 95 % | DIASTOLIC BLOOD PRESSURE: 72 MMHG | WEIGHT: 135.14 LBS | SYSTOLIC BLOOD PRESSURE: 125 MMHG | HEART RATE: 81 BPM | RESPIRATION RATE: 16 BRPM | BODY MASS INDEX: 26.53 KG/M2 | HEIGHT: 60 IN

## 2021-06-28 LAB
GLUCOSE BLD STRIP.AUTO-MCNC: 113 MG/DL (ref 65–117)
GLUCOSE BLD STRIP.AUTO-MCNC: 122 MG/DL (ref 65–117)
GLUCOSE BLD STRIP.AUTO-MCNC: 131 MG/DL (ref 65–117)
SERVICE CMNT-IMP: ABNORMAL
SERVICE CMNT-IMP: ABNORMAL
SERVICE CMNT-IMP: NORMAL

## 2021-06-28 PROCEDURE — 74011250636 HC RX REV CODE- 250/636: Performed by: HOSPITALIST

## 2021-06-28 PROCEDURE — 82962 GLUCOSE BLOOD TEST: CPT

## 2021-06-28 RX ORDER — SODIUM CHLORIDE, SODIUM LACTATE, POTASSIUM CHLORIDE, CALCIUM CHLORIDE 600; 310; 30; 20 MG/100ML; MG/100ML; MG/100ML; MG/100ML
100 INJECTION, SOLUTION INTRAVENOUS CONTINUOUS
Status: DISCONTINUED | OUTPATIENT
Start: 2021-06-28 | End: 2021-06-28 | Stop reason: HOSPADM

## 2021-06-28 RX ADMIN — SODIUM CHLORIDE, POTASSIUM CHLORIDE, SODIUM LACTATE AND CALCIUM CHLORIDE 100 ML/HR: 600; 310; 30; 20 INJECTION, SOLUTION INTRAVENOUS at 12:38

## 2021-06-28 NOTE — DISCHARGE SUMMARY
Discharge Summary       PATIENT ID: Kaz Del Rosario  MRN: [id number]   YOB: 1927    DATE OF ADMISSION: 6/22/2021  1:52 PM    DATE OF DISCHARGE: 6/28/21   PRIMARY CARE PROVIDER: Jennie Velazquez MD     ATTENDING PHYSICIAN: Turner Wood  DISCHARGING PROVIDER: Oliver Ferrer MD    To contact this individual call [phone] and ask the  to page. If unavailable ask to be transferred the Adult Hospitalist Department. CONSULTATIONS: ED CONSULT TO SENIOR SERVICES NURSE PRACTITIONER  IP CONSULT TO PALLIATIVE CARE - PROVIDER  IP CONSULT TO NEUROLOGY  IP CONSULT TO GASTROENTEROLOGY  IP CONSULT TO HOSPITALIST    PROCEDURES/SURGERIES: * No surgery found *    ADMITTING [address] [facility] COURSE:   80 y. o. female w/ pmh of htn and dementia who is brought to ER by family for increasing debility and refusal to eat or drink.  History is provided by family at bedside as patient is unable to do so given her advanced dementia.  Family states patient had been in her baseline health until about 3-1/2 days ago when they noticed a progressive decline in her mentation as well as p.o. tolerance.  24 hours prior to ER presentation, family state that they noted her right facial droop she was seen at [facility] 2 days ago and was discharged back to home.  Additionally, family states patient's PCP was consulted and prescribed an antibiotic for presumed UTI which was never started.        Assessment & Plan:      Acute CVA  FTT / Progressive Dementia  -presenting with cognitive decline, poor oral intake, right facial droop   -MRI  - 1. Acute left anterior mid marcela infarct. 2. Evidence of mild chronic small vessel ischemic change.   -SLP,- eval pureed thick diet   -Unable to participate in PT/OT at this time- has full time care givers at home  -home with hospice care      Hypoglycemia:   Hypernatremia:   -suspect due to decreased oral intake  -na 146      Low TSH:   - T4 within normal limit     Hypertension  -As needed hydralazine as unable to swallow      Dementia  -Seroquel resume PRN haldol     Possible Acute cystitis  -Urinalysis equivocal                   DISCHARGE DIAGNOSES / PLAN:      1. D/ home with hospice care      ADDITIONAL CARE RECOMMENDATIONS:        PENDING TEST RESULTS:   At the time of discharge the following test results are still pending:     FOLLOW UP APPOINTMENTS:    Follow-up Information     Follow up With Specialties Details Why Contact Info    Tay Wyatt MD Family Medicine In 1 day [facility] care will f/u tomorrow [address]  [phone]               DIET: Dysphagia diet-pureed nectar thick liquid    ACTIVITY: Bedrest    WOUND CARE:     EQUIPMENT needed:       DISCHARGE MEDICATIONS:  Current Discharge Medication List      START taking these medications    Details   aspirin 81 mg chewable tablet Take 1 Tablet by mouth daily for 30 days. Qty: 30 Tablet, Refills: 0  Start date: 6/26/2021, End date: 7/26/2021         CONTINUE these medications which have NOT CHANGED    Details   ketoconazole (NIZORAL) 2 % shampoo PLEASE SEE ATTACHED FOR DETAILED DIRECTIONS      acetaminophen (TYLENOL ARTHRITIS PAIN) 650 mg TbER Take 650 mg by mouth daily. QUEtiapine (SEROQUEL) 25 mg tablet Take 25 mg by mouth nightly. amoxicillin-clavulanate (AUGMENTIN) 500-125 mg per tablet Take 1 Tab by mouth every eight (8) hours. Qty: 12 Tab, Refills: 0               NOTIFY YOUR PHYSICIAN FOR ANY OF THE FOLLOWING:   Fever over 101 degrees for 24 hours. Chest pain, shortness of breath, fever, chills, nausea, vomiting, diarrhea, change in mentation, falling, weakness, bleeding. Severe pain or pain not relieved by medications. Or, any other signs or symptoms that you may have questions about.     DISPOSITION:   x Home With:   OT  PT  HH  RN       Long term SNF/Inpatient Rehab    Independent/assisted living   x Hospice    Other:       PATIENT CONDITION AT DISCHARGE:     Functional status   x Poor     Deconditioned     Independent      Cognition     Lucid     Forgetful    x Dementia      Catheters/lines (plus indication)    [place]     PICC     PEG    x None      Code status   x  Full code     DNR      PHYSICAL EXAMINATION AT DISCHARGE:  General:          Alert, cooperative, no distress, appears stated age. HEENT:           Atraumatic, anicteric sclerae, pink conjunctivae                          No oral ulcers, mucosa moist, throat clear, dentition fair  Neck:               Supple, symmetrical  Lungs:             Clear to auscultation bilaterally. No Wheezing or Rhonchi. No rales. Chest wall:      No tenderness  No Accessory muscle use. Heart:              Regular  rhythm,  No  murmur   No edema  Abdomen:        Soft, non-tender. Not distended. Bowel sounds normal  Extremities:     No cyanosis. No clubbing,                            Skin turgor normal, Capillary refill normal  Skin:                Not pale. Not Jaundiced  No rashes   Psych:             Not anxious or agitated.   Neurologic:      Alert, moves all extremities, answers questions appropriately and responds to commands       CHRONIC MEDICAL DIAGNOSES:  Problem List as of 6/28/2021 Date Reviewed: 9/4/2018        Codes Class Noted - Resolved    Anorexia ICD-10-CM: R63.0  ICD-9-CM: 783.0  Unknown - Present        Goals of care, counseling/discussion ICD-10-CM: Z71.89  ICD-9-CM: V65.49  Unknown - Present        Physical debility ICD-10-CM: R53.81  ICD-9-CM: 799.3  Unknown - Present        FTT (failure to thrive) in adult ICD-10-CM: R62.7  ICD-9-CM: 783.7  6/22/2021 - Present        CVA (cerebral vascular accident) [facility]) ICD-10-CM: I63.9  ICD-9-CM: 434.91  6/22/2021 - Present        Right hip pain ICD-10-CM: M25.551  ICD-9-CM: 719.45  9/4/2018 - Present        Bilateral pneumonia ICD-10-CM: J18.9  ICD-9-CM: 837  9/4/2018 - Present        Pleural effusion, right ICD-10-CM: J90  ICD-9-CM: 511.9  9/4/2018 - Present              Greater than 30  minutes were spent with the patient on counseling and coordination of care    Signed:   Angela Bond MD  6/28/2021  2:18 PM

## 2021-06-28 NOTE — PROGRESS NOTES
4: 13pm. [facility] calls and transport pushed to 5:45pm.  CM notifying family and Hospice  Agency. 4:17pm. Call placed to [facility]. Line busy. CM unable to leave message.     Yuri Patricia, [address], [address]

## 2021-06-28 NOTE — PROGRESS NOTES
YESI:  RUR: 11%    Disposition:  Home with Hospice. CM received hand-off from previous CM. Plans are for patient d/c to home today with Hospice services provided by [facility]. CM called agency liaison Shaquille Arreguin [phone]) and left [facility] requesting return call. [phone] and discussed d/c plans. Family at bedside and requested CM visit. Patient's grandson Belle Anne( is the primary paid caregiver)--[employer]. Son is at bedside. CM asked to assist with d/c transportation. Family would like to get patient home as soon as possible. CM arranging BLS transport after delivery of hospital bed. CM spoke with patient's daughter/decision-maker Philip Mak) who confirms awaiting for bed delivery before transporting patient home. BLS transport placed on will call for today. Heather HargroveAngeli, [facility] access hospital  [phone]    11:09am  CM called [facility] main office ([phone]). Advised of call to Brittny Can earlier. CM checking on delivery time for hospital bed. 11:35am  CM received return call from Brittny Can. [employer] will delivery equipment (hospital bed) today at 3pm.    CM will notify [facility] for 4pm transport request.    12:15 pm Daughter America Bridegroom) at bedside and in agreement with 4pm transport.

## 2021-06-28 NOTE — PROGRESS NOTES
Speech pathology  Followed up to see patient/provide any desired education to family prior to discharge today. Patient remains lethargic with poor intake. She is too lethargic for PO at this time. An MBS was completed 6/25 and puree/ nectar thick liquids were recommended. Risk of aspiration is high given oral holding of all consistencies. Family just left and are not available for education. Since plan is to discharge home with Hospice, encourage diet liberalization such as thin liquids if patient alert and requesting. Will continue to follow for now until discharged to ensure no further questions.      Thanks,    Saad Monet M.S. CCC-SLP

## 2022-03-19 PROBLEM — J18.9 BILATERAL PNEUMONIA: Status: ACTIVE | Noted: 2018-09-04

## 2022-03-19 PROBLEM — I63.9 CVA (CEREBRAL VASCULAR ACCIDENT) (HCC): Status: ACTIVE | Noted: 2021-06-22

## 2022-03-19 PROBLEM — R62.7 FTT (FAILURE TO THRIVE) IN ADULT: Status: ACTIVE | Noted: 2021-06-22

## 2022-03-19 PROBLEM — J90 PLEURAL EFFUSION, RIGHT: Status: ACTIVE | Noted: 2018-09-04

## 2022-03-20 PROBLEM — M25.551 RIGHT HIP PAIN: Status: ACTIVE | Noted: 2018-09-04

## 2023-06-20 NOTE — PROGRESS NOTES
..Bedside shift change report given to Rahul Sam and Vandana Lee RN (oncoming nurse) by Rubin Estrada RN (offgoing nurse). Report included the following information SBAR, Kardex and MAR. Oculoplastic Surgeon Procedure Text (A): After obtaining clear surgical margins the patient was sent to oculoplastics for surgical repair.  The patient understands they will receive post-surgical care and follow-up from the referring physician's office.

## 2024-03-14 ENCOUNTER — TELEPHONE (OUTPATIENT)
Facility: CLINIC | Age: 89
End: 2024-03-14

## 2024-03-14 NOTE — TELEPHONE ENCOUNTER
[facility] Referral - Kerri Major is the patient's daughter.  She called to find out more about [facility] for the patient.  Kerri asks for a callback at [phone]

## 2024-03-15 ENCOUNTER — TELEPHONE (OUTPATIENT)
Age: 89
End: 2024-03-15

## 2024-03-15 ENCOUNTER — TELEPHONE (OUTPATIENT)
Facility: CLINIC | Age: 89
End: 2024-03-15

## 2024-03-15 NOTE — TELEPHONE ENCOUNTER
Referral - Kerri Major called requesting more information about [facility] and to schedule an appointment for the patient. I let her know that we have a referral process and she would need to speak with our referral intake nurse Stephanie Quinonez.  Kerri said that she called yesterday but I must have not understood what she was talking about.  She states that she has not had a callback yet.  I let Kerri know that she could potentially receive a callback this afternoon and that her first message was sent to the referral nurse, she will call back as soon as she has availability.  I also told Kerri that we are not urgent care and our process for admission is not immediate.  She acknowledged.    Kerri's callback is [phone]

## 2024-03-15 NOTE — TELEPHONE ENCOUNTER
Patient's daughter, Kerri, called trying to get her mother in for an appt asap. Pt is 89 years old and has dementia. She is having to get new providers due to an insurance change (now [employer]). Patient was last seen in the hospital by Dr Jannie Hill June of 2021 putting her right on the line of our three years = new patient policy. Spoke with  who advised that Patient would need to be scheduled as a New Patient (time slot) as it has been so long since she was seen and the only place she was seen was in the hospital. Spoke with Nurse Mo who advised she would check with our providers to see what we can to do to help Patient. Kerri stated it is a bit hard to move Patient currently as she is dealing with hemorrhoids. Please advise. [phone]

## 2024-03-20 NOTE — TELEPHONE ENCOUNTER
Returned call to Kerri Major.  She states that she is in a meeting and requests that this nurse call her back tomorrow afternoon.

## 2024-03-21 NOTE — TELEPHONE ENCOUNTER
[facility]   (formerly [facility])   Phone:  [phone]      Fax:  [phone] [address], [address]  [place] [address]    Name:  Jennifer Goff  YOB: 1927    Returned call to patient's daughter Kerri Major.  She states that her mother Jennifer Goff is 95 yo and has dementia.  She is looking for a PCP that makes home visits.    This nurse explained [facility] services and that due to unexpected staffing changes, it may be a 6-8 week wait for a [facility] visit.  Ms. Major says she will call [facility] and will call us back if they do not accept patient's [employer] insurance.      Stephanie Quinonez RN, Gerontological Nursing-BC, PN

## 2024-03-29 NOTE — TELEPHONE ENCOUNTER
Called patient's daughter, Kerri. Left voicemail stating we are looking for a spot to fit in her mother for an appointment as per their request. Advised to call back to get more information on symptoms that patient is having and try to get something scheduled. Provided office phone number.

## 2024-03-29 NOTE — TELEPHONE ENCOUNTER
Patient's daughter, Kerri returned phone call and stated that she was following up from a hospital visit her mother had in 2021. She stated she was told to follow up with neurology. I advised that we see patients after a hospital visit 6-8 weeks post visit. Asked if she had concerns at this regarding her mothers health. She said she is not concerned at this time. She informed me that patient is being established with new primary care. Advised to have PCP evaluate and if she advises patient needs to be seen by neurologist to reach out to us to schedule an appointment. Informed patient to please reach out if any concerns arise to not hesitate to call us and we would be more than happy to put her on the schedule.

## 2024-07-27 ENCOUNTER — HOSPITAL ENCOUNTER (INPATIENT)
Facility: HOSPITAL | Age: 89
LOS: 3 days | Discharge: HOME | DRG: 871 | End: 2024-07-31
Attending: EMERGENCY MEDICINE | Admitting: HOSPITALIST
Payer: MEDICARE

## 2024-07-27 DIAGNOSIS — A41.9 SEPTICEMIA (HCC): Primary | ICD-10-CM

## 2024-07-27 DIAGNOSIS — J18.9 PNEUMONIA OF RIGHT LOWER LOBE DUE TO INFECTIOUS ORGANISM: ICD-10-CM

## 2024-07-27 DIAGNOSIS — L89.159 PRESSURE INJURY OF SKIN OF SACRAL REGION, UNSPECIFIED INJURY STAGE: ICD-10-CM

## 2024-07-27 PROCEDURE — 80053 COMPREHEN METABOLIC PANEL: CPT

## 2024-07-27 PROCEDURE — 99285 EMERGENCY DEPT VISIT HI MDM: CPT

## 2024-07-27 PROCEDURE — 87040 BLOOD CULTURE FOR BACTERIA: CPT

## 2024-07-27 PROCEDURE — 96365 THER/PROPH/DIAG IV INF INIT: CPT

## 2024-07-27 PROCEDURE — 87154 CUL TYP ID BLD PTHGN 6+ TRGT: CPT

## 2024-07-27 PROCEDURE — 84145 PROCALCITONIN (PCT): CPT

## 2024-07-27 PROCEDURE — 87076 CULTURE ANAEROBE IDENT EACH: CPT

## 2024-07-27 PROCEDURE — 36415 COLL VENOUS BLD VENIPUNCTURE: CPT

## 2024-07-27 PROCEDURE — 85025 COMPLETE CBC W/AUTO DIFF WBC: CPT

## 2024-07-27 PROCEDURE — 84484 ASSAY OF TROPONIN QUANT: CPT

## 2024-07-27 PROCEDURE — 96367 TX/PROPH/DG ADDL SEQ IV INF: CPT

## 2024-07-27 ASSESSMENT — PAIN SCALES - GENERAL: PAINLEVEL_OUTOF10: 0

## 2024-07-27 ASSESSMENT — PAIN - FUNCTIONAL ASSESSMENT: PAIN_FUNCTIONAL_ASSESSMENT: 0-10

## 2024-07-28 ENCOUNTER — APPOINTMENT (OUTPATIENT)
Facility: HOSPITAL | Age: 89
DRG: 871 | End: 2024-07-28
Payer: MEDICARE

## 2024-07-28 PROBLEM — M25.551 RIGHT HIP PAIN: Status: RESOLVED | Noted: 2018-09-04 | Resolved: 2024-07-28

## 2024-07-28 PROBLEM — E43 SEVERE PROTEIN-CALORIE MALNUTRITION (HCC): Status: ACTIVE | Noted: 2024-07-28

## 2024-07-28 PROBLEM — L89.150 PRESSURE INJURY OF SACRAL REGION, UNSTAGEABLE (HCC): Status: ACTIVE | Noted: 2024-07-28

## 2024-07-28 PROBLEM — R64 CACHEXIA (HCC): Status: ACTIVE | Noted: 2024-07-28

## 2024-07-28 PROBLEM — R50.9 FEVER: Status: ACTIVE | Noted: 2024-07-28

## 2024-07-28 PROBLEM — D72.829 LEUKOCYTOSIS: Status: ACTIVE | Noted: 2024-07-28

## 2024-07-28 PROBLEM — J90 PLEURAL EFFUSION, RIGHT: Status: RESOLVED | Noted: 2018-09-04 | Resolved: 2024-07-28

## 2024-07-28 PROBLEM — A41.9 SEPSIS (HCC): Status: ACTIVE | Noted: 2024-07-28

## 2024-07-28 PROBLEM — I95.9 HYPOTENSION: Status: ACTIVE | Noted: 2024-07-28

## 2024-07-28 PROBLEM — I63.9 CVA (CEREBRAL VASCULAR ACCIDENT) (HCC): Status: RESOLVED | Noted: 2021-06-22 | Resolved: 2024-07-28

## 2024-07-28 PROBLEM — D64.9 ANEMIA: Status: ACTIVE | Noted: 2024-07-28

## 2024-07-28 PROBLEM — M86.28 SUBACUTE OSTEOMYELITIS, OTHER SITE (HCC): Status: ACTIVE | Noted: 2024-07-28

## 2024-07-28 PROBLEM — E88.09 HYPOALBUMINEMIA: Status: ACTIVE | Noted: 2024-07-28

## 2024-07-28 PROBLEM — J18.9 BILATERAL PNEUMONIA: Status: RESOLVED | Noted: 2018-09-04 | Resolved: 2024-07-28

## 2024-07-28 PROBLEM — F03.90 DEMENTIA (HCC): Status: ACTIVE | Noted: 2024-07-28

## 2024-07-28 PROBLEM — I10 HYPERTENSION: Status: ACTIVE | Noted: 2024-07-28

## 2024-07-28 PROBLEM — Z86.73 HX OF COMPLETED STROKE: Status: ACTIVE | Noted: 2024-07-28

## 2024-07-28 LAB
ALBUMIN SERPL-MCNC: 2.3 G/DL (ref 3.5–5)
ALBUMIN/GLOB SERPL: 0.5 (ref 1.1–2.2)
ALP SERPL-CCNC: 88 U/L (ref 45–117)
ALT SERPL-CCNC: 15 U/L (ref 12–78)
ANION GAP SERPL CALC-SCNC: 6 MMOL/L (ref 5–15)
APPEARANCE UR: CLEAR
AST SERPL-CCNC: 32 U/L (ref 15–37)
BASOPHILS # BLD: 0.1 K/UL (ref 0–0.1)
BASOPHILS NFR BLD: 0 % (ref 0–1)
BILIRUB SERPL-MCNC: 0.8 MG/DL (ref 0.2–1)
BILIRUB UR QL: NEGATIVE
BUN SERPL-MCNC: 17 MG/DL (ref 6–20)
BUN/CREAT SERPL: 21 (ref 12–20)
CALCIUM SERPL-MCNC: 8.6 MG/DL (ref 8.5–10.1)
CHLORIDE SERPL-SCNC: 106 MMOL/L (ref 97–108)
CO2 SERPL-SCNC: 25 MMOL/L (ref 21–32)
COLOR UR: NORMAL
CREAT SERPL-MCNC: 0.81 MG/DL (ref 0.55–1.02)
DIFFERENTIAL METHOD BLD: ABNORMAL
EOSINOPHIL # BLD: 0.1 K/UL (ref 0–0.4)
EOSINOPHIL NFR BLD: 0 % (ref 0–7)
ERYTHROCYTE [DISTWIDTH] IN BLOOD BY AUTOMATED COUNT: 13.9 % (ref 11.5–14.5)
GLOBULIN SER CALC-MCNC: 4.7 G/DL (ref 2–4)
GLUCOSE SERPL-MCNC: 126 MG/DL (ref 65–100)
GLUCOSE UR STRIP.AUTO-MCNC: NEGATIVE MG/DL
HCT VFR BLD AUTO: 30.5 % (ref 35–47)
HGB BLD-MCNC: 9.7 G/DL (ref 11.5–16)
HGB UR QL STRIP: NEGATIVE
IMM GRANULOCYTES # BLD AUTO: 0.1 K/UL (ref 0–0.04)
IMM GRANULOCYTES NFR BLD AUTO: 1 % (ref 0–0.5)
KETONES UR QL STRIP.AUTO: NEGATIVE MG/DL
LACTATE BLD-SCNC: 1.56 MMOL/L (ref 0.4–2)
LEUKOCYTE ESTERASE UR QL STRIP.AUTO: NEGATIVE
LYMPHOCYTES # BLD: 1.5 K/UL (ref 0.8–3.5)
LYMPHOCYTES NFR BLD: 8 % (ref 12–49)
MCH RBC QN AUTO: 28.3 PG (ref 26–34)
MCHC RBC AUTO-ENTMCNC: 31.8 G/DL (ref 30–36.5)
MCV RBC AUTO: 88.9 FL (ref 80–99)
MONOCYTES # BLD: 1.3 K/UL (ref 0–1)
MONOCYTES NFR BLD: 7 % (ref 5–13)
NEUTS SEG # BLD: 16.5 K/UL (ref 1.8–8)
NEUTS SEG NFR BLD: 84 % (ref 32–75)
NITRITE UR QL STRIP.AUTO: NEGATIVE
NRBC # BLD: 0 K/UL (ref 0–0.01)
NRBC BLD-RTO: 0 PER 100 WBC
PH UR STRIP: 6 (ref 5–8)
PLATELET # BLD AUTO: 408 K/UL (ref 150–400)
PMV BLD AUTO: 10.4 FL (ref 8.9–12.9)
POTASSIUM SERPL-SCNC: 4.2 MMOL/L (ref 3.5–5.1)
PROCALCITONIN SERPL-MCNC: 0.37 NG/ML
PROT SERPL-MCNC: 7 G/DL (ref 6.4–8.2)
PROT UR STRIP-MCNC: NEGATIVE MG/DL
RBC # BLD AUTO: 3.43 M/UL (ref 3.8–5.2)
SODIUM SERPL-SCNC: 137 MMOL/L (ref 136–145)
SP GR UR REFRACTOMETRY: 1 (ref 1–1.03)
TROPONIN I SERPL HS-MCNC: 12 NG/L (ref 0–51)
UROBILINOGEN UR QL STRIP.AUTO: 1 EU/DL (ref 0.2–1)
WBC # BLD AUTO: 19.5 K/UL (ref 3.6–11)

## 2024-07-28 PROCEDURE — 6370000000 HC RX 637 (ALT 250 FOR IP): Performed by: HOSPITALIST

## 2024-07-28 PROCEDURE — 6360000002 HC RX W HCPCS: Performed by: EMERGENCY MEDICINE

## 2024-07-28 PROCEDURE — 6360000004 HC RX CONTRAST MEDICATION: Performed by: EMERGENCY MEDICINE

## 2024-07-28 PROCEDURE — 2580000003 HC RX 258: Performed by: HOSPITALIST

## 2024-07-28 PROCEDURE — 6370000000 HC RX 637 (ALT 250 FOR IP): Performed by: EMERGENCY MEDICINE

## 2024-07-28 PROCEDURE — 6360000002 HC RX W HCPCS: Performed by: HOSPITALIST

## 2024-07-28 PROCEDURE — 74177 CT ABD & PELVIS W/CONTRAST: CPT

## 2024-07-28 PROCEDURE — 51798 US URINE CAPACITY MEASURE: CPT

## 2024-07-28 PROCEDURE — 94761 N-INVAS EAR/PLS OXIMETRY MLT: CPT

## 2024-07-28 PROCEDURE — 71045 X-RAY EXAM CHEST 1 VIEW: CPT

## 2024-07-28 PROCEDURE — 87086 URINE CULTURE/COLONY COUNT: CPT

## 2024-07-28 PROCEDURE — 81002 URINALYSIS NONAUTO W/O SCOPE: CPT

## 2024-07-28 PROCEDURE — 1100000000 HC RM PRIVATE

## 2024-07-28 PROCEDURE — 0HB6XZZ EXCISION OF BACK SKIN, EXTERNAL APPROACH: ICD-10-PCS | Performed by: SURGERY

## 2024-07-28 PROCEDURE — 2580000003 HC RX 258: Performed by: EMERGENCY MEDICINE

## 2024-07-28 PROCEDURE — 83605 ASSAY OF LACTIC ACID: CPT

## 2024-07-28 PROCEDURE — 51701 INSERT BLADDER CATHETER: CPT

## 2024-07-28 RX ORDER — ACETAMINOPHEN 650 MG/1
650 SUPPOSITORY RECTAL
Status: COMPLETED | OUTPATIENT
Start: 2024-07-28 | End: 2024-07-28

## 2024-07-28 RX ORDER — BISACODYL 10 MG
10 SUPPOSITORY, RECTAL RECTAL DAILY
Status: COMPLETED | OUTPATIENT
Start: 2024-07-28 | End: 2024-07-30

## 2024-07-28 RX ORDER — ACETAMINOPHEN 650 MG/1
650 SUPPOSITORY RECTAL EVERY 6 HOURS PRN
Status: DISCONTINUED | OUTPATIENT
Start: 2024-07-28 | End: 2024-07-28

## 2024-07-28 RX ORDER — POLYETHYLENE GLYCOL 3350 17 G/17G
17 POWDER, FOR SOLUTION ORAL DAILY PRN
Status: DISCONTINUED | OUTPATIENT
Start: 2024-07-28 | End: 2024-07-29

## 2024-07-28 RX ORDER — ONDANSETRON 4 MG/1
4 TABLET, ORALLY DISINTEGRATING ORAL EVERY 8 HOURS PRN
Status: DISCONTINUED | OUTPATIENT
Start: 2024-07-28 | End: 2024-07-31 | Stop reason: HOSPADM

## 2024-07-28 RX ORDER — AMLODIPINE BESYLATE 5 MG/1
5 TABLET ORAL DAILY
Status: ON HOLD | COMMUNITY
End: 2024-07-30 | Stop reason: HOSPADM

## 2024-07-28 RX ORDER — SODIUM CHLORIDE 9 MG/ML
INJECTION, SOLUTION INTRAVENOUS PRN
Status: DISCONTINUED | OUTPATIENT
Start: 2024-07-28 | End: 2024-07-31 | Stop reason: HOSPADM

## 2024-07-28 RX ORDER — ACETAMINOPHEN 325 MG/1
650 TABLET ORAL EVERY 6 HOURS PRN
Status: DISCONTINUED | OUTPATIENT
Start: 2024-07-28 | End: 2024-07-31 | Stop reason: HOSPADM

## 2024-07-28 RX ORDER — HEPARIN SODIUM 5000 [USP'U]/ML
5000 INJECTION, SOLUTION INTRAVENOUS; SUBCUTANEOUS 2 TIMES DAILY
Status: DISCONTINUED | OUTPATIENT
Start: 2024-07-28 | End: 2024-07-31 | Stop reason: HOSPADM

## 2024-07-28 RX ORDER — SODIUM CHLORIDE 0.9 % (FLUSH) 0.9 %
5-40 SYRINGE (ML) INJECTION EVERY 12 HOURS SCHEDULED
Status: DISCONTINUED | OUTPATIENT
Start: 2024-07-28 | End: 2024-07-31 | Stop reason: HOSPADM

## 2024-07-28 RX ORDER — SODIUM CHLORIDE 9 MG/ML
INJECTION, SOLUTION INTRAVENOUS CONTINUOUS
Status: DISCONTINUED | OUTPATIENT
Start: 2024-07-28 | End: 2024-07-28

## 2024-07-28 RX ORDER — PANTOPRAZOLE SODIUM 40 MG/1
1 TABLET, DELAYED RELEASE ORAL DAILY
COMMUNITY
Start: 2024-07-31

## 2024-07-28 RX ORDER — DEXTROSE MONOHYDRATE AND SODIUM CHLORIDE 5; .9 G/100ML; G/100ML
INJECTION, SOLUTION INTRAVENOUS CONTINUOUS
Status: DISCONTINUED | OUTPATIENT
Start: 2024-07-28 | End: 2024-07-30

## 2024-07-28 RX ORDER — 0.9 % SODIUM CHLORIDE 0.9 %
30 INTRAVENOUS SOLUTION INTRAVENOUS ONCE
Status: COMPLETED | OUTPATIENT
Start: 2024-07-28 | End: 2024-07-28

## 2024-07-28 RX ORDER — ONDANSETRON 2 MG/ML
4 INJECTION INTRAMUSCULAR; INTRAVENOUS EVERY 6 HOURS PRN
Status: DISCONTINUED | OUTPATIENT
Start: 2024-07-28 | End: 2024-07-31 | Stop reason: HOSPADM

## 2024-07-28 RX ORDER — QUETIAPINE FUMARATE 25 MG/1
1 TABLET, FILM COATED ORAL
COMMUNITY
Start: 2024-07-31

## 2024-07-28 RX ORDER — SODIUM CHLORIDE 0.9 % (FLUSH) 0.9 %
5-40 SYRINGE (ML) INJECTION PRN
Status: DISCONTINUED | OUTPATIENT
Start: 2024-07-28 | End: 2024-07-31 | Stop reason: HOSPADM

## 2024-07-28 RX ORDER — PANTOPRAZOLE SODIUM 40 MG/1
40 TABLET, DELAYED RELEASE ORAL
Status: DISCONTINUED | OUTPATIENT
Start: 2024-07-28 | End: 2024-07-31 | Stop reason: HOSPADM

## 2024-07-28 RX ORDER — QUETIAPINE FUMARATE 25 MG/1
25 TABLET, FILM COATED ORAL
Status: DISCONTINUED | OUTPATIENT
Start: 2024-07-28 | End: 2024-07-31 | Stop reason: HOSPADM

## 2024-07-28 RX ADMIN — PIPERACILLIN AND TAZOBACTAM 3375 MG: 3; .375 INJECTION, POWDER, LYOPHILIZED, FOR SOLUTION INTRAVENOUS at 17:22

## 2024-07-28 RX ADMIN — SODIUM CHLORIDE 1000 ML: 9 INJECTION, SOLUTION INTRAVENOUS at 05:14

## 2024-07-28 RX ADMIN — VANCOMYCIN HYDROCHLORIDE 500 MG: 500 INJECTION, POWDER, LYOPHILIZED, FOR SOLUTION INTRAVENOUS at 17:24

## 2024-07-28 RX ADMIN — SODIUM CHLORIDE: 9 INJECTION, SOLUTION INTRAVENOUS at 09:01

## 2024-07-28 RX ADMIN — SODIUM CHLORIDE, PRESERVATIVE FREE 10 ML: 5 INJECTION INTRAVENOUS at 09:11

## 2024-07-28 RX ADMIN — DEXTROSE AND SODIUM CHLORIDE: 5; 900 INJECTION, SOLUTION INTRAVENOUS at 17:27

## 2024-07-28 RX ADMIN — DEXTROSE AND SODIUM CHLORIDE: 5; 900 INJECTION, SOLUTION INTRAVENOUS at 06:57

## 2024-07-28 RX ADMIN — BISACODYL 10 MG: 10 SUPPOSITORY RECTAL at 08:56

## 2024-07-28 RX ADMIN — HEPARIN SODIUM 5000 UNITS: 5000 INJECTION INTRAVENOUS; SUBCUTANEOUS at 21:39

## 2024-07-28 RX ADMIN — ACETAMINOPHEN 650 MG: 650 SUPPOSITORY RECTAL at 01:37

## 2024-07-28 RX ADMIN — PIPERACILLIN AND TAZOBACTAM 4500 MG: 4; .5 INJECTION, POWDER, FOR SOLUTION INTRAVENOUS at 01:52

## 2024-07-28 RX ADMIN — QUETIAPINE FUMARATE 25 MG: 25 TABLET ORAL at 21:39

## 2024-07-28 RX ADMIN — IOPAMIDOL 90 ML: 755 INJECTION, SOLUTION INTRAVENOUS at 02:20

## 2024-07-28 RX ADMIN — PIPERACILLIN AND TAZOBACTAM 3375 MG: 3; .375 INJECTION, POWDER, LYOPHILIZED, FOR SOLUTION INTRAVENOUS at 09:04

## 2024-07-28 RX ADMIN — VANCOMYCIN HYDROCHLORIDE 1000 MG: 1 INJECTION, POWDER, LYOPHILIZED, FOR SOLUTION INTRAVENOUS at 02:53

## 2024-07-28 RX ADMIN — HEPARIN SODIUM 5000 UNITS: 5000 INJECTION INTRAVENOUS; SUBCUTANEOUS at 08:56

## 2024-07-28 RX ADMIN — PIPERACILLIN AND TAZOBACTAM 3375 MG: 3; .375 INJECTION, POWDER, LYOPHILIZED, FOR SOLUTION INTRAVENOUS at 23:41

## 2024-07-28 RX ADMIN — SODIUM CHLORIDE, PRESERVATIVE FREE 10 ML: 5 INJECTION INTRAVENOUS at 21:39

## 2024-07-28 ASSESSMENT — PAIN SCALES - GENERAL
PAINLEVEL_OUTOF10: 0

## 2024-07-28 ASSESSMENT — PAIN SCALES - WONG BAKER: WONGBAKER_NUMERICALRESPONSE: NO HURT

## 2024-07-28 NOTE — PROGRESS NOTES
[facility] Dosing Services: Antimicrobial Stewardship Daily Doc  Consult for antibiotic dosing of Vancomycin by Dr. Maddox  Indication: SSTi,  Wound  Day of Therapy: 1/7    Ht Readings from Last 1 Encounters:   07/27/24 1.549 m (5' 1\")        Wt Readings from Last 1 Encounters:   07/27/24 43.1 kg (95 lb)      Vancomycin therapy:  Loading dose: Vancomycin 1000 mg x1 dose 07/28 @ 0253  Maintenance dose: Vancomycin 500 mg IV every 18 hours   Dose calculated to approximate a           a. Target AUC/GUSTAVO of 400-600          b. Trough of 15-20 mcg/mL   Last level: na  Plan:   -will start 500 mg every 18 hrs, predicted AUC (24-48)=462, OXBgd=636, Trough=16  -will order a level once 1st maintenance dose is given.   -CBC/BMP x1 for 7/29    Dose administration notes: Doses given appropriately as scheduled    Non-Kinetic Antimicrobial Dosing Regimen:   Current Regimen:  zosyn 3.375 g q8h EI  Recommendation: continue same  Dose administration notes: Doses given appropriately as scheduled    Other Antimicrobial   (not dosed by pharmacist) na   Cultures 07/27 Blood x2   Serum Creatinine Lab Results   Component Value Date/Time    CREATININE 0.81 07/27/2024 11:53 PM          Creatinine Clearance Estimated Creatinine Clearance: 28 mL/min (based on SCr of 0.81 mg/dL).     Temp Temp: 98.7 °F (37.1 °C)       WBC Lab Results   Component Value Date/Time    WBC 19.5 07/27/2024 11:53 PM          Procalcitonin Lab Results   Component Value Date/Time    PROCAL 0.37 07/27/2024 11:53 PM      For Antifungals, Metronidazole and Nafcillin: Lab Results   Component Value Date/Time    ALT 15 07/27/2024 11:53 PM    AST 32 07/27/2024 11:53 PM        Pharmacist: Ria Calzada, BrownD, MS, BCPS    [phone]

## 2024-07-28 NOTE — ED NOTES
TRANSFER - OUT REPORT:    Verbal report given to Carmen LUGO on Jennifer Goff  being transferred to 3rd floor  for routine progression of patient care       Report consisted of patient's Situation, Background, Assessment and   Recommendations(SBAR).     Information from the following report(s) Nurse Handoff Report, ED Encounter Summary, and ED SBAR was reviewed with the receiving nurse.    [place] Assessment:                           Lines:   Peripheral IV 07/28/24 Posterior;Right Hand (Active)        Opportunity for questions and clarification was provided.      Patient transported with:  Registered Nurse

## 2024-07-28 NOTE — ED PROVIDER NOTES
[facility] EMERGENCY DEPT  EMERGENCY DEPARTMENT ENCOUNTER      Pt Name: Jennifer Goff  MRN: [id number]  Birthdate 8/8/1927  Date of evaluation: 7/27/2024  Provider: Kendall Serrano MD      HISTORY OF PRESENT ILLNESS      89-year-old female with history of dementia presents to the emergency room with her family with concern for a sacral wound.  She is only been with the family for a few days now and the wound has opened up.  Patient has had her nighttime medicine and is sleeping on my evaluation.    The history is provided by the patient and medical records.           Nursing Notes were reviewed.    REVIEW OF SYSTEMS         Review of Systems        PAST MEDICAL HISTORY     Past Medical History:   Diagnosis Date    Dementia (HCC)     As of 6/2021, pt has h/o dementia for approx 8 years, she is verbal and feeds herself at home, she is in a WC and needs assistance with ADLs o/w.     Hypertension          SURGICAL HISTORY     No past surgical history on file.      CURRENT MEDICATIONS       Previous Medications    No medications on file       ALLERGIES     Patient has no known allergies.    FAMILY HISTORY     No family history on file.       SOCIAL HISTORY       Social History     Socioeconomic History    Marital status:    Tobacco Use    Smoking status: Never    Smokeless tobacco: Never   Substance and Sexual Activity    Alcohol use: No    Drug use: No         PHYSICAL EXAM       ED Triage Vitals [07/27/24 2331]   BP Temp Temp Source Pulse Respirations SpO2 Height Weight - Scale   133/63 (!) 101.1 °F (38.4 °C) Axillary (!) 110 19 94 % 1.549 m (5' 1\") 43.1 kg (95 lb)       Body mass index is 17.95 kg/m².    Physical Exam  Vitals and nursing note reviewed.   Constitutional:       Comments: Cachectic   Cardiovascular:      Rate and Rhythm: Tachycardia present.   Skin:     Comments: Decubitus wound on her sacrum             EMERGENCY DEPARTMENT COURSE and DIFFERENTIAL DIAGNOSIS/MDM:   Vitals:    Vitals:     [JM]   9797 I have independently viewed the obtained radiographic images and note CT abdomen pelvis demonstrates right-sided parenchymal lung disease.  Sacral wound extends deeply but not into the peritoneum or retroperitoneal spaces. Will await radiology read. [JM]      ED Course User Index  [JM] Kendall Serrano MD         CONSULTS:  None    PROCEDURES:     Procedures      Perfect Serve Consult for Admission  4:02 AM    ED Room Number: ER12/12  Patient Name and age:  Jennifer Goff 89 y.o.  female  Working Diagnosis:   1. Septicemia (HCC)    2. Pneumonia of right lower lobe due to infectious organism    3. Pressure injury of skin of sacral region, unspecified injury stage        COVID-19 Suspicion: No  Sepsis present:  Yes    Code Status:  Full Code  Readmission: No  Isolation Requirements: no  Recommended Level of Care: med/surg  Department: [facility] ED - [phone]  Consulting Provider:     Other: Concern for potential osteomyelitis based on CT findings at her sacral decubitus wound    ED Sepsis Documentation (2024)  - Broad Spectrum Antibiotics ordered: Zosyn and Vancomycin  - Repeat lactic acid: not indicated due to initial lactate < 2  - Sepsis re-assessment performed 07/28/24 at time 4:03 AM  and clinical condition not changed.  Recent Labs     07/27/24  2353 07/28/24  0004   POCLACTIC  --  1.56   CREATININE 0.81  --    BILITOT 0.8  --    *  --    Organ dysfunction (Lactic acid >2, Cr>2, bili >2, INR>1.5, Plt<100, BiPap, intubated) exclusions if applicable:     - Hypotension or Lactic Acidosis present (SBP<90, MAP<65, Lactate?4): NO  If YES:  - IVF: Not indicated due to septic shock not present Total volume:  cc (cannot be 0)  - Persistent Hypotension despite IVF resuscitation: NO    - Vasopressors: Not indicated due to septic shock not present            (Please note that portions of this note were completed with a voice recognition program.  Efforts were made to edit the  dictations but occasionally words are mis-transcribed.)    Kendall Serrano MD (electronically signed)  Emergency Attending Physician              Kendall Serrano MD  07/28/24 7686

## 2024-07-28 NOTE — CARE COORDINATION
7/28/2024   CARE MANAGEMENT NOTE:  (weekend coverage).  CM received consult for hospice.  A referral was sent to [facility] for info session.  Nancy

## 2024-07-28 NOTE — ACP (ADVANCE CARE PLANNING)
[facility] Medicine                                   Advance Care Planning Note    Name: Jennifer Goff  YOB: 1927  MRN: [id number]  Admission Date: 7/28/2024   Date of discussion: 7/28/2024    Active Diagnoses:    Sepsis, POA  Infected sacral decubitus, POA  RLL PNA  Severe malnutrition  Severe dementia        These active diagnoses are of sufficient risk that focused discussion on advance care planning is indicated in order to allow the patient to thoughtfully consider personal goals of care, and if situations arise that prevent the ability to personally give input, to ensure appropriate representation of their personal desires for different levels and aggressiveness of care.     Discussion:     Persons present and participating in discussion: Jennifer Goff, Germania Maddox MD, daughter Kerri Major, granddaughter Batsheva    Topics Discussed:  Patient's medical condition and diagnosis: [ y ] yes [  ] no   Surrogate decision maker: [y  ] yes [  ] no   Patient's current physical function/cognitive function/frailty: [ y ] yes [  ] no   Code Status: [ y ] yes [  ] no   Artificial Nutrition / Dialysis / Non-Invasive Ventilation / Blood Transfusion: [  ] yes [  ] no  Potential Resources for home (durable medical equipment, home nursing, home O2): [ y ] yes [  ] no    Overview of Discussion:   Patient with sepsis due to infected sacral decubitus ulcer and possible RLL PNA from aspiration.  Blood pressure soft concerning for developing septic shock.  Severe protein malnutrition as reflected by albumin 2.3.  Patient with severe dementia, not walking for few years, require assistance with feeding.    Kerri and granddaughter both stated they would want patient to be DNR/DNI given her age, frailty.  However, if pressor needed for blood pressure support, they would want that initiated.      They report conflict with family members including Kerri's
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
## 2024-07-28 NOTE — PROGRESS NOTES
0530 Report received from Carmen LUGO     0558 Patients BP is 103/34 with a MAP of 49. Notified MD. Per MD goal is to keep SBP above 90.     0600 Patient is unable to complete med rec.     0700 Bedside shift change report given to Bernadette LUGO (oncoming nurse) by Mariella LUGO (offgoing nurse). Report included the following information SBAR, Intake/Output, MAR, Recent Results, and Cardiac Rhythm NSR .

## 2024-07-28 NOTE — ED TRIAGE NOTES
Pt to room in a wheelchair c/o possible bedsore. Pt's family now has patient living with them and they noticed a peeling dark spot on her lower back/upper glut.

## 2024-07-28 NOTE — PROGRESS NOTES
0700 Bedside and Verbal shift change report given to Bernadette LUGO (oncoming nurse) by Mariella RN (offgoing nurse). Report included the following information Nurse Handoff Report, Adult Overview, Intake/Output, MAR, Recent Results, and Cardiac Rhythm NSR .      1051 Notified MD about Pt needing clean catch urine sample. Orders received to obtain straight cath urine sample for Urine culture.     1215 RN at bedside with Dr. Li with Gen Surg. MD to debride Patient sacral wound. Consent received from Pt family.     1412 Notified Dr. Mitchell that Family has questions about plan of care for Patient. MD to come to the bedside.     1512 Report given to Purnima RN on 5th floor for Patient moving to room 507. This RN Notified Pt family of Pt move to new room.

## 2024-07-28 NOTE — PLAN OF CARE
Problem: Skin/Tissue Integrity  Goal: Absence of new skin breakdown  Description: 1.  Monitor for areas of redness and/or skin breakdown  2.  Assess vascular access sites hourly  3.  Every 4-6 hours minimum:  Change oxygen saturation probe site  4.  Every 4-6 hours:  If on nasal continuous positive airway pressure, respiratory therapy assess nares and determine need for appliance change or resting period.  7/28/2024 1626 by Purnima Rajput, RN  Outcome: Progressing  7/28/2024 0622 by Mariella Busch RN  Outcome: Not Progressing     Problem: Safety - Adult  Goal: Free from fall injury  7/28/2024 1626 by Purnima Rajput, RN  Outcome: Progressing  7/28/2024 0622 by Mariella Busch RN  Outcome: Progressing     Problem: Discharge Planning  Goal: Discharge to home or other facility with appropriate resources  Outcome: Progressing     Problem: Pain  Goal: Verbalizes/displays adequate comfort level or baseline comfort level  Outcome: Progressing     Problem: Skin/Tissue Integrity  Goal: Absence of new skin breakdown  Description: 1.  Monitor for areas of redness and/or skin breakdown  2.  Assess vascular access sites hourly  3.  Every 4-6 hours minimum:  Change oxygen saturation probe site  4.  Every 4-6 hours:  If on nasal continuous positive airway pressure, respiratory therapy assess nares and determine need for appliance change or resting period.  7/28/2024 1626 by Purnima Rajput, RN  Outcome: Progressing  7/28/2024 0622 by Mariella Busch RN  Outcome: Not Progressing

## 2024-07-28 NOTE — PROGRESS NOTES
[facility]    Hospitalist Progress Note    NAME: Jennifer Goff   :  1927  MRM:  [id number]    Date/Time of service 2024  11:22 AM    To assist coordination of care and communication with nursing and staff, this note may be preliminary early in the day, but finalized by end of the day.   Total time spent with patient: 45 Minutes I personally rounded from 10:30 to 11:15 AM in addition to the time spent by my partner, Dr Maddox, earlier today. I personally reviewed chart, notes, data and current medications in the medical record.  I have personally examined and treated the patient at bedside during this period.  I personally made additional diagnoses, placed additional orders and had additional discussions.       Assessment and Plan:     Sepsis / Leukocytosis / Fever - POA, presumed due to sacral osteomyelitis. Check UA.  For now Zosyn/Vanco.    Subacute osteomyelitis / Pressure injury of sacral region, unstageable - POA due to debility and malnutrition.  Surgery consulted. I doubt definitive debridement possible. I doubt healing is possible.  For now Zosyn and Vanco.     Severe protein-calorie malnutrition / Cachexia / Hypoalbuminemia / Failure to thrive in adult - POA severe due to dementia and prior CVA.  Nutrition support.    Dementia / Hx of completed stroke / Dysphagia / Anorexia - POA, severe and end stable. Palliative consult.  Hospice will be the only deposition that avoid repeated admissions.  Family is ready now for that meeting. Anticipate discharge to hospice at home.    Aspiration pneumonia - POA, possible, based on dysphagia and xray. Zosyn for now.    Physical debility - POA due to above.  No potential for rehab.      Hypotension / Hx Hypertension - POA, low BP due to dehydration and sepsis.  Hydrate.      Anemia - POA due to chronic disease and it will worsen with hydration.       Subjective:     Chief Complaint:  no events    ROS:  (bold if positive, if  Nursing Staff, Consultant/Specialist, and >50% of time spent in counseling and coordination of care    Discussed:  Care Plan and D/C Planning    Prophylaxis:  Lovenox and H2B/PPI    Disposition:  Home w/Family           ___________________________________________________    Attending Physician: Ganga Mitchell MD

## 2024-07-28 NOTE — H&P
% 0 0 - 1 %    Immature Granulocytes % 1 (H) 0.0 - 0.5 %    Neutrophils Absolute 16.5 (H) 1.8 - 8.0 K/UL    Lymphocytes Absolute 1.5 0.8 - 3.5 K/UL    Monocytes Absolute 1.3 (H) 0.0 - 1.0 K/UL    Eosinophils Absolute 0.1 0.0 - 0.4 K/UL    Basophils Absolute 0.1 0.0 - 0.1 K/UL    Immature Granulocytes Absolute 0.1 (H) 0.00 - 0.04 K/UL    Differential Type AUTOMATED     Comprehensive Metabolic Panel    Collection Time: 07/27/24 11:53 PM   Result Value Ref Range    Sodium 137 136 - 145 mmol/L    Potassium 4.2 3.5 - 5.1 mmol/L    Chloride 106 97 - 108 mmol/L    CO2 25 21 - 32 mmol/L    Anion Gap 6 5 - 15 mmol/L    Glucose 126 (H) 65 - 100 mg/dL    BUN 17 6 - 20 MG/DL    Creatinine 0.81 0.55 - 1.02 MG/DL    BUN/Creatinine Ratio 21 (H) 12 - 20      Est, Glom Filt Rate 66 >60 ml/min/1.73m2    Calcium 8.6 8.5 - 10.1 MG/DL    Total Bilirubin 0.8 0.2 - 1.0 MG/DL    ALT 15 12 - 78 U/L    AST 32 15 - 37 U/L    Alk Phosphatase 88 45 - 117 U/L    Total Protein 7.0 6.4 - 8.2 g/dL    Albumin 2.3 (L) 3.5 - 5.0 g/dL    Globulin 4.7 (H) 2.0 - 4.0 g/dL    Albumin/Globulin Ratio 0.5 (L) 1.1 - 2.2     Procalcitonin    Collection Time: 07/27/24 11:53 PM   Result Value Ref Range    Procalcitonin 0.37 ng/mL   Troponin    Collection Time: 07/27/24 11:53 PM   Result Value Ref Range    Troponin, High Sensitivity 12 0 - 51 ng/L   POC Lactic Acid    Collection Time: 07/28/24 12:04 AM   Result Value Ref Range    POC Lactic Acid 1.56 0.40 - 2.00 mmol/L         CT Result (most recent):  CT ABDOMEN PELVIS W IV CONTRAST 07/28/2024    Narrative  EXAM: CT ABDOMEN PELVIS W IV CONTRAST    INDICATION: Decubitus ulcer, eval for depth of wound    COMPARISON: CT abdomen/pelvis on 9/3/2018.    CONTRAST: 90 mL of Isovue-370.    ORAL CONTRAST: None    TECHNIQUE:  Following intravenous administration of contrast, thin axial images were  obtained through the abdomen and pelvis. Coronal and sagittal reconstructions  were generated. CT dose reduction was achieved  07/28/2024    Narrative  EXAM:  XR CHEST PORTABLE    INDICATION: Chest pain and sepsis.    COMPARISON: Portable chest on 6/22/2021 and 9/4/2018.    TECHNIQUE: 2 images of portable chest AP view    FINDINGS: Patient is rotated to the right. The cardiac silhouette is within  normal limits. The pulmonary vasculature is within normal limits.    Clear left lung. Limited evaluation of the right lung. Osteopenia.    Impression  No acute process on Limited portable chest.      Electronically signed by Milton Pate      ________________________________________________________________________  Signed: Germania Maddox MD        Procedures: see electronic medical records for all procedures/Xrays/labs and details which were not copied into this note but were reviewed prior to creation of Plan.

## 2024-07-29 ENCOUNTER — HOSPICE ADMISSION (OUTPATIENT)
Age: 89
End: 2024-07-29
Payer: MEDICARE

## 2024-07-29 LAB
ACCESSION NUMBER, LLC1M: ABNORMAL
ACINETOBACTER CALCOAC BAUMANNII COMPLEX BY PCR: NOT DETECTED
ALBUMIN SERPL-MCNC: 1.7 G/DL (ref 3.5–5)
ALBUMIN/GLOB SERPL: 0.4 (ref 1.1–2.2)
ALP SERPL-CCNC: 71 U/L (ref 45–117)
ALT SERPL-CCNC: 16 U/L (ref 12–78)
ANION GAP SERPL CALC-SCNC: 4 MMOL/L (ref 5–15)
AST SERPL-CCNC: ABNORMAL U/L (ref 15–37)
B FRAGILIS DNA BLD POS QL NAA+NON-PROBE: DETECTED
BACTERIA SPEC CULT: NORMAL
BILIRUB SERPL-MCNC: 0.5 MG/DL (ref 0.2–1)
BIOFIRE TEST COMMENT: ABNORMAL
BUN SERPL-MCNC: 12 MG/DL (ref 6–20)
BUN/CREAT SERPL: 23 (ref 12–20)
C ALBICANS DNA BLD POS QL NAA+NON-PROBE: NOT DETECTED
C AURIS DNA BLD POS QL NAA+NON-PROBE: NOT DETECTED
C GATTII+NEOFOR DNA BLD POS QL NAA+N-PRB: NOT DETECTED
C GLABRATA DNA BLD POS QL NAA+NON-PROBE: NOT DETECTED
C KRUSEI DNA BLD POS QL NAA+NON-PROBE: NOT DETECTED
C PARAP DNA BLD POS QL NAA+NON-PROBE: NOT DETECTED
C TROPICLS DNA BLD POS QL NAA+NON-PROBE: NOT DETECTED
CALCIUM SERPL-MCNC: 8 MG/DL (ref 8.5–10.1)
CHLORIDE SERPL-SCNC: 115 MMOL/L (ref 97–108)
CO2 SERPL-SCNC: 25 MMOL/L (ref 21–32)
CREAT SERPL-MCNC: 0.53 MG/DL (ref 0.55–1.02)
E CLOAC COMP DNA BLD POS NAA+NON-PROBE: NOT DETECTED
E COLI DNA BLD POS QL NAA+NON-PROBE: NOT DETECTED
E FAECALIS DNA BLD POS QL NAA+NON-PROBE: NOT DETECTED
E FAECIUM DNA BLD POS QL NAA+NON-PROBE: NOT DETECTED
ENTEROBACTERALES DNA BLD POS NAA+N-PRB: NOT DETECTED
ERYTHROCYTE [DISTWIDTH] IN BLOOD BY AUTOMATED COUNT: 13.9 % (ref 11.5–14.5)
GLOBULIN SER CALC-MCNC: 4.3 G/DL (ref 2–4)
GLUCOSE SERPL-MCNC: 117 MG/DL (ref 65–100)
GP B STREP DNA BLD POS QL NAA+NON-PROBE: NOT DETECTED
HAEM INFLU DNA BLD POS QL NAA+NON-PROBE: NOT DETECTED
HCT VFR BLD AUTO: 26.2 % (ref 35–47)
HGB BLD-MCNC: 8.4 G/DL (ref 11.5–16)
K OXYTOCA DNA BLD POS QL NAA+NON-PROBE: NOT DETECTED
KLEBSIELLA SP DNA BLD POS QL NAA+NON-PRB: NOT DETECTED
KLEBSIELLA SP DNA BLD POS QL NAA+NON-PRB: NOT DETECTED
L MONOCYTOG DNA BLD POS QL NAA+NON-PROBE: NOT DETECTED
MAGNESIUM SERPL-MCNC: NORMAL MG/DL (ref 1.6–2.4)
MCH RBC QN AUTO: 28.1 PG (ref 26–34)
MCHC RBC AUTO-ENTMCNC: 32.1 G/DL (ref 30–36.5)
MCV RBC AUTO: 87.6 FL (ref 80–99)
N MEN DNA BLD POS QL NAA+NON-PROBE: NOT DETECTED
NRBC # BLD: 0 K/UL (ref 0–0.01)
NRBC BLD-RTO: 0 PER 100 WBC
P AERUGINOSA DNA BLD POS NAA+NON-PROBE: NOT DETECTED
PHOSPHATE SERPL-MCNC: 2.5 MG/DL (ref 2.6–4.7)
PLATELET # BLD AUTO: 463 K/UL (ref 150–400)
PMV BLD AUTO: 10.7 FL (ref 8.9–12.9)
POTASSIUM SERPL-SCNC: ABNORMAL MMOL/L (ref 3.5–5.1)
PROT SERPL-MCNC: 6 G/DL (ref 6.4–8.2)
PROTEUS SP DNA BLD POS QL NAA+NON-PROBE: NOT DETECTED
RBC # BLD AUTO: 2.99 M/UL (ref 3.8–5.2)
RESISTANT GENE TARGETS: ABNORMAL
S AUREUS DNA BLD POS QL NAA+NON-PROBE: NOT DETECTED
S AUREUS+CONS DNA BLD POS NAA+NON-PROBE: NOT DETECTED
S EPIDERMIDIS DNA BLD POS QL NAA+NON-PRB: NOT DETECTED
S LUGDUNENSIS DNA BLD POS QL NAA+NON-PRB: NOT DETECTED
S MALTOPHILIA DNA BLD POS QL NAA+NON-PRB: NOT DETECTED
S MARCESCENS DNA BLD POS NAA+NON-PROBE: NOT DETECTED
S PNEUM DNA BLD POS QL NAA+NON-PROBE: NOT DETECTED
S PYO DNA BLD POS QL NAA+NON-PROBE: NOT DETECTED
SALMONELLA DNA BLD POS QL NAA+NON-PROBE: NOT DETECTED
SERVICE CMNT-IMP: NORMAL
SODIUM SERPL-SCNC: 144 MMOL/L (ref 136–145)
STREPTOCOCCUS DNA BLD POS NAA+NON-PROBE: NOT DETECTED
VANCOMYCIN SERPL-MCNC: 7 UG/ML
WBC # BLD AUTO: 15.2 K/UL (ref 3.6–11)

## 2024-07-29 PROCEDURE — 6370000000 HC RX 637 (ALT 250 FOR IP): Performed by: HOSPITALIST

## 2024-07-29 PROCEDURE — 80202 ASSAY OF VANCOMYCIN: CPT

## 2024-07-29 PROCEDURE — 2580000003 HC RX 258: Performed by: HOSPITALIST

## 2024-07-29 PROCEDURE — 83735 ASSAY OF MAGNESIUM: CPT

## 2024-07-29 PROCEDURE — 94761 N-INVAS EAR/PLS OXIMETRY MLT: CPT

## 2024-07-29 PROCEDURE — 84100 ASSAY OF PHOSPHORUS: CPT

## 2024-07-29 PROCEDURE — 85027 COMPLETE CBC AUTOMATED: CPT

## 2024-07-29 PROCEDURE — 6360000002 HC RX W HCPCS: Performed by: HOSPITALIST

## 2024-07-29 PROCEDURE — 1100000000 HC RM PRIVATE

## 2024-07-29 PROCEDURE — 6370000000 HC RX 637 (ALT 250 FOR IP): Performed by: INTERNAL MEDICINE

## 2024-07-29 PROCEDURE — 36415 COLL VENOUS BLD VENIPUNCTURE: CPT

## 2024-07-29 PROCEDURE — 80053 COMPREHEN METABOLIC PANEL: CPT

## 2024-07-29 RX ORDER — CASTOR OIL AND BALSAM, PERU 788; 87 MG/G; MG/G
OINTMENT TOPICAL 2 TIMES DAILY
Status: DISCONTINUED | OUTPATIENT
Start: 2024-07-29 | End: 2024-07-31 | Stop reason: HOSPADM

## 2024-07-29 RX ORDER — POLYETHYLENE GLYCOL 3350 17 G/17G
17 POWDER, FOR SOLUTION ORAL 2 TIMES DAILY
Status: DISCONTINUED | OUTPATIENT
Start: 2024-07-29 | End: 2024-07-31 | Stop reason: HOSPADM

## 2024-07-29 RX ADMIN — DEXTROSE AND SODIUM CHLORIDE: 5; 900 INJECTION, SOLUTION INTRAVENOUS at 15:12

## 2024-07-29 RX ADMIN — SODIUM CHLORIDE, PRESERVATIVE FREE 10 ML: 5 INJECTION INTRAVENOUS at 21:27

## 2024-07-29 RX ADMIN — PIPERACILLIN AND TAZOBACTAM 3375 MG: 3; .375 INJECTION, POWDER, LYOPHILIZED, FOR SOLUTION INTRAVENOUS at 17:04

## 2024-07-29 RX ADMIN — VANCOMYCIN HYDROCHLORIDE 500 MG: 500 INJECTION, POWDER, LYOPHILIZED, FOR SOLUTION INTRAVENOUS at 12:13

## 2024-07-29 RX ADMIN — HEPARIN SODIUM 5000 UNITS: 5000 INJECTION INTRAVENOUS; SUBCUTANEOUS at 08:55

## 2024-07-29 RX ADMIN — HEPARIN SODIUM 5000 UNITS: 5000 INJECTION INTRAVENOUS; SUBCUTANEOUS at 21:29

## 2024-07-29 RX ADMIN — POLYETHYLENE GLYCOL 3350 17 G: 17 POWDER, FOR SOLUTION ORAL at 21:33

## 2024-07-29 RX ADMIN — PANTOPRAZOLE SODIUM 40 MG: 40 TABLET, DELAYED RELEASE ORAL at 05:24

## 2024-07-29 RX ADMIN — BISACODYL 10 MG: 10 SUPPOSITORY RECTAL at 08:54

## 2024-07-29 RX ADMIN — DEXTROSE AND SODIUM CHLORIDE: 5; 900 INJECTION, SOLUTION INTRAVENOUS at 03:05

## 2024-07-29 RX ADMIN — CASTOR OIL AND BALSAM, PERU: 788; 87 OINTMENT TOPICAL at 22:41

## 2024-07-29 RX ADMIN — PIPERACILLIN AND TAZOBACTAM 3375 MG: 3; .375 INJECTION, POWDER, LYOPHILIZED, FOR SOLUTION INTRAVENOUS at 08:50

## 2024-07-29 RX ADMIN — QUETIAPINE FUMARATE 25 MG: 25 TABLET ORAL at 21:33

## 2024-07-29 ASSESSMENT — PAIN - FUNCTIONAL ASSESSMENT
PAIN_FUNCTIONAL_ASSESSMENT: 0-10

## 2024-07-29 ASSESSMENT — PAIN SCALES - GENERAL: PAINLEVEL_OUTOF10: 0

## 2024-07-29 NOTE — PROGRESS NOTES
Spiritual Care Assessment/Progress Note  [facility][note 3]    Name: Jennifer Goff MRN: [id number]    Age: 89 y.o.     Sex: female   Language: English     Date: 7/29/2024            Total Time Calculated: 13 min              Spiritual Assessment begun in [facility] B5 MULTI-SPECIALTY ONCOLOGY 1  Service Provided For: Patient and family together  Referral/Consult From: Rounding  Encounter Overview/Reason: Palliative Care    Spiritual beliefs:      [x] Involved in a shey tradition/spiritual practice: Zoroastrian     [] Supported by a shey community:      [] Claims no spiritual orientation:      [] Seeking spiritual identity:           [] Adheres to an individual form of spirituality:      [] Not able to assess:                Identified resources for coping and support system:   Support System: Family members       [] Prayer                  [] Devotional reading               [] Music                  [] Guided Imagery     [] Pet visits                                        [] Other: (COMMENT)     Specific area/focus of visit   Encounter:    Crisis:    Spiritual/Emotional needs: Type: Spiritual Support  Ritual, Rites and Sacraments:    Grief, Loss, and Adjustments:    Ethics/Mediation:    Behavioral Health:    Palliative Care: Type: Palliative Care, Initial/Spiritual Assessment  Advance Care Planning:      Plan/Referrals: Other (Comment) ([facility] for [facility].)    Narrative: Initial spiritual assessment in 5th Floor. Reviewed chart prior to visit. Miss Goff, her daughter and son in law were in the room.  Her daughter was assisting her to to eat lunch.  She indicated her mother goes by Patricia Rodriguez.  Patricia lifted her head when I said my name was Patricia too.  It was apparent Patricia was enjoying her lunch.  Family indicated strong spiritual belief.  Provided assurance of prayers.Contact Spiritual Care for any further referrals.   juhi Starks MDiv., MS., [facility]  Page a

## 2024-07-29 NOTE — PROGRESS NOTES
[facility]    Hospitalist Progress Note    NAME: Jennifer Goff   :  1927  MRM:  [id number]    Date/Time of service 2024  7:18 AM    To assist coordination of care and communication with nursing and staff, this note may be preliminary early in the day, but finalized by end of the day.        Assessment and Plan:     Sepsis / Leukocytosis / Fever - POA, presumed due to sacral osteomyelitis and/or PNA. WBC better. Negative UA.  For now Zosyn/Vanco.    Subacute osteomyelitis / Pressure injury of sacral region, unstageable - POA due to debility and malnutrition.  Surgery consulted, they did a bedside cleaning, and no pus found. I doubt definitive debridement possible. I doubt full healing is possible.  For now Zosyn and Vanco.     Severe protein-calorie malnutrition / Cachexia / Hypoalbuminemia / Failure to thrive in adult - POA severe due to dementia and prior CVA.  Nutrition support.  Family states she \"eats great\"    Dementia / Hx of completed stroke / Dysphagia / Anorexia - POA, severe and end stage. Palliative consult.  Hospice will be the only deposition that avoid repeated admissions.  Family is ready now for that meeting. Anticipate discharge to hospice at home.    Aspiration pneumonia - POA, possible, based on dysphagia and xray. Zosyn for now.    Physical debility - POA due to above.  No potential for improvement at rehab.      Hypotension / Hx Hypertension - POA, low BP due to dehydration and sepsis.  Hydrated and better.      Anemia - POA due to chronic disease and it will worsen with hydration.       Subjective:     Chief Complaint:  no events    ROS:  (bold if positive, if negative)    Not Tolerating PT  NPO        Objective:     Last 24hrs VS reviewed since prior progress note. Most recent are:    Vitals:    24 2331 24 2346 24 2349 24 0336   BP: (!) 93/32 (!) 118/41 (!) 108/59 112/67   Pulse: 66  76 92   Resp:    16   Temp:    98.6 °F (37  personally reviewed chart, notes, data and current medications in the medical record.  I have personally examined and treated the patient at bedside during this period.                  Care Plan discussed with: Patient, Family, Care Manager, Nursing Staff, Consultant/Specialist, and >50% of time spent in counseling and coordination of care    Discussed:  Care Plan and D/C Planning    Prophylaxis:  Lovenox and H2B/PPI    Disposition:  Home w/Family           ___________________________________________________    Attending Physician: Ganga Mitchell MD

## 2024-07-29 NOTE — PROGRESS NOTES
Waiting for hospice consult/decision.   Suspect aspiration, as aspiration has been present since at least 2021 on MBS study. Recommendation at that time was for purees, mildly thick liquids.   However, if she is pending hospice, will leave her on diet for comfort.

## 2024-07-29 NOTE — WOUND CARE
Wound Care Note:     New consult for \"pressure injury on Sacrum and bilateral heels\"   Seen in 507/01     89 y.o. y/o female admitted on 7/27/2024   Admitted for Septicemia (HCC) [A41.9]  Sepsis (HCC) [A41.9]  Pneumonia of right lower lobe due to infectious organism [J18.9]  Pressure injury of skin of sacral region, unspecified injury stage [L89.159]     History of Cachexia, dementia, dysphagia, stroke, HTN, malnutrition   WBC = 15.2  Diet: ADULT DIET; Dysphagia - Soft and Bite Sized  ADULT ORAL NUTRITION SUPPLEMENT; Breakfast, Lunch, Dinner; Standard 4 oz Oral Supplement           Assessment:   Patient is drowsy, cooperative and reports no pain.    Requires full assist to reposition for assessment.    Incontinent. Wearing briefs.    Surface: Deepak bed with LIBERTAD mattress, blower box turned on at end of visit    1. POA Sacrum  ~10 x 7 x 1 cm Unstageable pressure injury, post bedside debridement   Mixed wound bed of red granular tissue and tan/black nonviable tissue, undefined edges, serosanguinous discharge, no pain, no odor, with surrounding areas of purple nonblanching and blanchable erythema.  Tx: Cleansed with Vashe, lightly packed with Vashe soaked kerlix and covered with sacral foam.      2. POA Right heel  3 x 2 cm DTI  Dry, purple non blanchable tissue, fluctuance, no open area.   Tx: Cleansed with Vashe, covered with 4x4 foam dressing.     3. POA Left heel  Three scattered DTI's in different stages. Medial: dry, tan nonviable tissue, no fluctuance, no open area. Lateral: dry, serous filled bullae with surrounding non blanchable redness, no open area. Posterior: Dry, maroon nonblanching, no open area.   Tx: Cleansed with Vashe, applied 4x4 foam dressing.     Recommendations:    Sacrum Cleanse with Vashe, apply santyl to 4x4 and place on wound bed , cover with additional Vashe moistened 4x4s anf cover with sacral foam. Change daily.   Bilateral heels Cleanse with Vashe, apply Venelex twice daily and cover  with 4x4 foam dressing. Change dressing daily.     Turn/reposition approximately every 2 hours  Offload heels with heels hanging off end of pillow at all times while in bed.  Sacral Foam dressing: lift to assess regularly; change as needed. Discontinue if incontinence is frequently soiling dressing.     Z-guard cream to buttocks and sacrum daily and as needed with incontinence care  Low Air Loss mattress: Use only flat sheet and one incontinence pad.     Discussed with Dr. Mitchell.    Transition of Care: Plan to follow weekly and as needed while admitted to hospital.    Tracey Adhikari RN  [facility] Inpatient Wound Care Department  Office [phone]  Available via [employer]

## 2024-07-29 NOTE — PROGRESS NOTES
Comprehensive Nutrition Assessment    Type and Reason for Visit: Initial, Positive Nutrition Screen    Nutrition Recommendations/Plan:   Continue Soft and Bites sized - SLP consult pending.    Add Ensure COMPACT TID for ease of pro/kcal intake.   Wound care consult pending.        Malnutrition Assessment:  Malnutrition Status:  Severe malnutrition (07/29/24 1030)    Context:  Chronic Illness     Findings of the 6 clinical characteristics of malnutrition:  Energy Intake:  75% or less estimated energy requirements for 1 month or longer  Weight Loss:  Mild weight loss (specify amount and time period)     Body Fat Loss:  Severe body fat loss Orbital, Buccal region   Muscle Mass Loss:  Severe muscle mass loss Clavicles (pectoralis & deltoids), Temples (temporalis), Hand (interosseous)  Fluid Accumulation:  No significant fluid accumulation     Strength:  Not Performed       Nutrition Assessment:    Past medical hx:       Diagnosis Date    Cachexia (HCC)     Dementia (HCC)     Dysphagia     Hx of completed stroke     Hypertension     Severe protein-calorie malnutrition (HCC)        Pt screened for low BMI - 17.96 gk/m2. Pt reportedly eating well, - documented intake was 0% while NPO yesterday, now on Soft and bite sized. Please document PO intake how that diet started. No known food allergies. Noted pt with multiple wounds and has a c/s to hospice. Pt meets malnutrition criteria 2/2 poor PO PTA, BMI, muscle/fat wasting. No recent wt hx - last measured weight was from 9/2018 was 123 lbs. Currently documented as 95 lbs. Monitor GOC and weight and PO intake.       Current Nutrition Therapies:  ADULT DIET; Dysphagia - Soft and Bite Sized  ADULT ORAL NUTRITION SUPPLEMENT; Breakfast, Lunch, Dinner; Standard 4 oz Oral Supplement  Meal Intake:   Patient Vitals for the past 168 hrs:   PO Meals Eaten (%)   07/28/24 1600 0%   07/28/24 1239 0%   07/28/24 0913 0%     Supplement Intake:  No data found.  Nutrition Support:  related to cognitive or neurological impairment as evidenced by intake 0-25%, wounds, BMI, severe muscle loss, severe loss of subcutaneous fat    Nutrition Interventions:   Food and/or Nutrient Delivery: Continue Current Diet, Start Oral Nutrition Supplement  Nutrition Education/Counseling: No recommendation at this time  Coordination of Nutrition Care: Continue to monitor while inpatient, Interdisciplinary Rounds, Swallow Evaluation       Goals:     Goals: PO intake 50% or greater, by next RD assessment       Nutrition Monitoring and Evaluation:   Behavioral-Environmental Outcomes: None Identified  Food/Nutrient Intake Outcomes: Diet Advancement/Tolerance, Food and Nutrient Intake, Supplement Intake  Physical Signs/Symptoms Outcomes: Biochemical Data, Weight, Skin    Discharge Planning:    Continue Oral Nutrition Supplement, Too soon to determine     Surjit Lutz RD  Available via [employer]  Office # [phone]

## 2024-07-29 NOTE — CONSULTS
Brief Palliative Note    Palliative consult received for the 89 year old female with dementia, aspiration pneumonia, and chronic osteomyelitis.  Hospice has been consulted by attending which seems reasonable.    Palliative will hold off for time being but remains available to assist if goals/dispo unclear after she meets with Hospice.     Thank you for considering Palliative team in the care of Jennifer Goff

## 2024-07-29 NOTE — PROGRESS NOTES
[facility]  Good Help to Those in Need  [phone]     Patient Name: Jennifer Goff  YOB: 1927  Age: 89 y.o.    [facility] RN Note:  Hospice consult received, reviewing chart. Will follow up with Unit Nurse and Care Manager to discuss plan of care, patient status and discharge disposition within the hour.     Met with daughter Kerri and her daughter, patients granddaughter. Some family members including some of the patients other children. Right now they wish to finish course of IV abx and then want to consider home hospice. We will continue to follow    Thank you for the opportunity to be of service to this patient.    SCOOTER Adkins, RN  Clinical Nurse Liaison  [facility]  [phone]  Office: [phone]  Available on [employer]

## 2024-07-30 LAB
ALBUMIN SERPL-MCNC: 1.7 G/DL (ref 3.5–5)
ALBUMIN/GLOB SERPL: 0.4 (ref 1.1–2.2)
ALP SERPL-CCNC: 74 U/L (ref 45–117)
ALT SERPL-CCNC: 16 U/L (ref 12–78)
ANION GAP SERPL CALC-SCNC: 6 MMOL/L (ref 5–15)
AST SERPL-CCNC: 26 U/L (ref 15–37)
BACTERIA SPEC CULT: ABNORMAL
BACTERIA SPEC CULT: ABNORMAL
BILIRUB SERPL-MCNC: 1 MG/DL (ref 0.2–1)
BUN SERPL-MCNC: 7 MG/DL (ref 6–20)
BUN/CREAT SERPL: 13 (ref 12–20)
CALCIUM SERPL-MCNC: 7.7 MG/DL (ref 8.5–10.1)
CHLORIDE SERPL-SCNC: 118 MMOL/L (ref 97–108)
CO2 SERPL-SCNC: 23 MMOL/L (ref 21–32)
CREAT SERPL-MCNC: 0.53 MG/DL (ref 0.55–1.02)
ERYTHROCYTE [DISTWIDTH] IN BLOOD BY AUTOMATED COUNT: 14.2 % (ref 11.5–14.5)
GLOBULIN SER CALC-MCNC: 3.9 G/DL (ref 2–4)
GLUCOSE SERPL-MCNC: 110 MG/DL (ref 65–100)
HCT VFR BLD AUTO: 26.7 % (ref 35–47)
HGB BLD-MCNC: 8.1 G/DL (ref 11.5–16)
MAGNESIUM SERPL-MCNC: 1.7 MG/DL (ref 1.6–2.4)
MCH RBC QN AUTO: 27.9 PG (ref 26–34)
MCHC RBC AUTO-ENTMCNC: 30.3 G/DL (ref 30–36.5)
MCV RBC AUTO: 92.1 FL (ref 80–99)
NRBC # BLD: 0 K/UL (ref 0–0.01)
NRBC BLD-RTO: 0 PER 100 WBC
PHOSPHATE SERPL-MCNC: 2 MG/DL (ref 2.6–4.7)
PLATELET # BLD AUTO: 379 K/UL (ref 150–400)
PMV BLD AUTO: 10.1 FL (ref 8.9–12.9)
POTASSIUM SERPL-SCNC: 3 MMOL/L (ref 3.5–5.1)
PROT SERPL-MCNC: 5.6 G/DL (ref 6.4–8.2)
RBC # BLD AUTO: 2.9 M/UL (ref 3.8–5.2)
SERVICE CMNT-IMP: ABNORMAL
SODIUM SERPL-SCNC: 147 MMOL/L (ref 136–145)
WBC # BLD AUTO: 11.2 K/UL (ref 3.6–11)

## 2024-07-30 PROCEDURE — 1100000000 HC RM PRIVATE

## 2024-07-30 PROCEDURE — 6360000002 HC RX W HCPCS: Performed by: HOSPITALIST

## 2024-07-30 PROCEDURE — 2500000003 HC RX 250 WO HCPCS: Performed by: INTERNAL MEDICINE

## 2024-07-30 PROCEDURE — 36415 COLL VENOUS BLD VENIPUNCTURE: CPT

## 2024-07-30 PROCEDURE — 84100 ASSAY OF PHOSPHORUS: CPT

## 2024-07-30 PROCEDURE — 92610 EVALUATE SWALLOWING FUNCTION: CPT

## 2024-07-30 PROCEDURE — 80053 COMPREHEN METABOLIC PANEL: CPT

## 2024-07-30 PROCEDURE — 94761 N-INVAS EAR/PLS OXIMETRY MLT: CPT

## 2024-07-30 PROCEDURE — 85027 COMPLETE CBC AUTOMATED: CPT

## 2024-07-30 PROCEDURE — 6370000000 HC RX 637 (ALT 250 FOR IP): Performed by: NURSE PRACTITIONER

## 2024-07-30 PROCEDURE — 83735 ASSAY OF MAGNESIUM: CPT

## 2024-07-30 PROCEDURE — 2580000003 HC RX 258: Performed by: HOSPITALIST

## 2024-07-30 PROCEDURE — 2580000003 HC RX 258: Performed by: INTERNAL MEDICINE

## 2024-07-30 PROCEDURE — 6370000000 HC RX 637 (ALT 250 FOR IP): Performed by: INTERNAL MEDICINE

## 2024-07-30 PROCEDURE — 6370000000 HC RX 637 (ALT 250 FOR IP): Performed by: HOSPITALIST

## 2024-07-30 RX ORDER — DEXTROSE MONOHYDRATE, SODIUM CHLORIDE, AND POTASSIUM CHLORIDE 50; 2.98; 4.5 G/1000ML; G/1000ML; G/1000ML
INJECTION, SOLUTION INTRAVENOUS CONTINUOUS
Status: DISCONTINUED | OUTPATIENT
Start: 2024-07-30 | End: 2024-07-31 | Stop reason: HOSPADM

## 2024-07-30 RX ORDER — AMOXICILLIN AND CLAVULANATE POTASSIUM 250; 62.5 MG/5ML; MG/5ML
500 POWDER, FOR SUSPENSION ORAL 2 TIMES DAILY
Qty: 200 ML | Refills: 0 | Status: SHIPPED | OUTPATIENT
Start: 2024-07-30 | End: 2024-08-09

## 2024-07-30 RX ADMIN — SODIUM CHLORIDE, PRESERVATIVE FREE 10 ML: 5 INJECTION INTRAVENOUS at 21:18

## 2024-07-30 RX ADMIN — POTASSIUM CHLORIDE, DEXTROSE MONOHYDRATE AND SODIUM CHLORIDE: 300; 5; 450 INJECTION, SOLUTION INTRAVENOUS at 09:17

## 2024-07-30 RX ADMIN — HEPARIN SODIUM 5000 UNITS: 5000 INJECTION INTRAVENOUS; SUBCUTANEOUS at 21:13

## 2024-07-30 RX ADMIN — CASTOR OIL AND BALSAM, PERU: 788; 87 OINTMENT TOPICAL at 09:15

## 2024-07-30 RX ADMIN — HEPARIN SODIUM 5000 UNITS: 5000 INJECTION INTRAVENOUS; SUBCUTANEOUS at 09:16

## 2024-07-30 RX ADMIN — DEXTROSE AND SODIUM CHLORIDE: 5; 900 INJECTION, SOLUTION INTRAVENOUS at 02:04

## 2024-07-30 RX ADMIN — COLLAGENASE SANTYL: 250 OINTMENT TOPICAL at 09:18

## 2024-07-30 RX ADMIN — CASTOR OIL AND BALSAM, PERU: 788; 87 OINTMENT TOPICAL at 21:18

## 2024-07-30 RX ADMIN — PIPERACILLIN AND TAZOBACTAM 3375 MG: 3; .375 INJECTION, POWDER, LYOPHILIZED, FOR SOLUTION INTRAVENOUS at 00:06

## 2024-07-30 RX ADMIN — VANCOMYCIN HYDROCHLORIDE 500 MG: 500 INJECTION, POWDER, LYOPHILIZED, FOR SOLUTION INTRAVENOUS at 05:39

## 2024-07-30 RX ADMIN — POTASSIUM PHOSPHATE, MONOBASIC POTASSIUM PHOSPHATE, DIBASIC 15 MMOL: 224; 236 INJECTION, SOLUTION, CONCENTRATE INTRAVENOUS at 10:59

## 2024-07-30 RX ADMIN — POTASSIUM CHLORIDE, DEXTROSE MONOHYDRATE AND SODIUM CHLORIDE: 300; 5; 450 INJECTION, SOLUTION INTRAVENOUS at 21:13

## 2024-07-30 RX ADMIN — POTASSIUM BICARBONATE 40 MEQ: 782 TABLET, EFFERVESCENT ORAL at 05:40

## 2024-07-30 RX ADMIN — POTASSIUM BICARBONATE 40 MEQ: 782 TABLET, EFFERVESCENT ORAL at 09:17

## 2024-07-30 RX ADMIN — PIPERACILLIN AND TAZOBACTAM 3375 MG: 3; .375 INJECTION, POWDER, LYOPHILIZED, FOR SOLUTION INTRAVENOUS at 15:59

## 2024-07-30 RX ADMIN — PIPERACILLIN AND TAZOBACTAM 3375 MG: 3; .375 INJECTION, POWDER, LYOPHILIZED, FOR SOLUTION INTRAVENOUS at 23:54

## 2024-07-30 RX ADMIN — VANCOMYCIN HYDROCHLORIDE 500 MG: 500 INJECTION, POWDER, LYOPHILIZED, FOR SOLUTION INTRAVENOUS at 19:40

## 2024-07-30 RX ADMIN — QUETIAPINE FUMARATE 25 MG: 25 TABLET ORAL at 21:14

## 2024-07-30 RX ADMIN — BISACODYL 10 MG: 10 SUPPOSITORY RECTAL at 09:16

## 2024-07-30 RX ADMIN — POLYETHYLENE GLYCOL 3350 17 G: 17 POWDER, FOR SOLUTION ORAL at 21:18

## 2024-07-30 RX ADMIN — PIPERACILLIN AND TAZOBACTAM 3375 MG: 3; .375 INJECTION, POWDER, LYOPHILIZED, FOR SOLUTION INTRAVENOUS at 09:00

## 2024-07-30 ASSESSMENT — PAIN - FUNCTIONAL ASSESSMENT: PAIN_FUNCTIONAL_ASSESSMENT: NONE - DENIES PAIN

## 2024-07-30 ASSESSMENT — PAIN SCALES - GENERAL
PAINLEVEL_OUTOF10: 0

## 2024-07-30 NOTE — PROGRESS NOTES
[facility]  Good Help to Those in Need  [phone]     Patient Name: Jennifer Goff  YOB: 1927  Age: 89 y.o.    [facility] RN Note:  Hospice follow up. Patient seen at bedside. Sleeping comfortably no distress noted. Of note patient has been changed fron DNR back to FULL CODE per  medical chart. Daughter, Shakira at bedside along with grandchild. Shakira reports that she was not present for hospice discussion yesterday however she is not patient's POA.     Patient's MPOA is daughter Kerri Major [phone]. Left message w/ daughter Kerri  who is MPOA to follow up on hospice discussion and DC plan. Will follow up as able    12:30 Kerri Major [phone].over the phone. She is interested is enrolling into hospice care once patient is ready for DC home. She is unsure when patient will be DC'd  Patient already has hospital bed in the home.   Consents can be sent to  Kerri at  [email or web address]. She did confirm that should would like the patient to be FULL CODE. MPOA paperwork is NOT on file -  she will bring that to the hospital today  Will follow and arrange for DC as able     Thank you for the opportunity to be of service to this patient.   Estella Bolton RN, BSN, CHPN  Clinical Nurse Liaison  [facility]  [phone] Mobile  [phone] Office   Available on [employer]
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
## 2024-07-30 NOTE — PROGRESS NOTES
[facility] Dosing Services: Antimicrobial Stewardship Daily Doc  Consult for antibiotic dosing of Vancomycin by Dr. Maddox  Indication: SSTI/sepsis d/t subacute sacral osteo and/or PNA  Day of Therapy: 3/7    Ht Readings from Last 1 Encounters:   07/29/24 1.549 m (5' 0.98\")        Wt Readings from Last 1 Encounters:   07/27/24 43.1 kg (95 lb)      Vancomycin therapy:  Loading dose: Vancomycin 1000 mg x1 dose 07/28 @ 0253  Maintenance dose: Vancomycin 500 mg IV every 18 hours   Dose calculated to approximate a           a. Target AUC/GUSTAVO of 400-600          b. Trough of 15-20 mcg/mL   Last level: 7/29 @ 1048 was 7.0 (17.5 hours post-dose)  Scr 0.53 (improved/stable); WBC 11.2; afebrile; PCT 0.37 (7/27); LA 1.56 (7/28)  Current regimen predicts AUC < 400.   Family would like to continue course of ABX then consider home hospice.   Plan: Adjust to vancomycin 500 mg IV Q12H to predict  per Bayesian kinetics.     Dose administration notes: Doses given appropriately as scheduled    Non-Kinetic Antimicrobial Dosing Regimen:   Current Regimen:  Zosyn 3.375 g q8h EI  Recommendation: continue same  Dose administration notes: Doses given appropriately as scheduled    Other Antimicrobial   (not dosed by pharmacist) N/a   Cultures 07/27 Blood x2 -  bacteroides fragilis 1/2 bottles (prelim)  07/27 Urine - ng FINAL   Serum Creatinine Lab Results   Component Value Date/Time    CREATININE 0.53 07/30/2024 04:06 AM      Creatinine Clearance Estimated Creatinine Clearance: 42 mL/min (A) (based on SCr of 0.53 mg/dL (L)).     Temp Temp: 98.4 °F (36.9 °C) (Axillary)       WBC Lab Results   Component Value Date/Time    WBC 11.2 07/30/2024 04:06 AM      Procalcitonin Lab Results   Component Value Date/Time    PROCAL 0.37 07/27/2024 11:53 PM      For Antifungals, Metronidazole and Nafcillin: Lab Results   Component Value Date/Time    ALT 16 07/30/2024 04:06 AM    AST 26 07/30/2024 04:06 AM        Thank you,  Mahamed Carr, Pharm  D, BCPS  [phone]

## 2024-07-30 NOTE — PROGRESS NOTES
[facility]    Hospitalist Progress Note    NAME: Jennifer Goff   :  1927  MRM:  [id number]    Date/Time of service 2024  8:24 AM    To assist coordination of care and communication with nursing and staff, this note may be preliminary early in the day, but finalized by end of the day.        Assessment and Plan:     Sepsis / Leukocytosis / Fever - POA, presumed due to sacral osteomyelitis and/or PNA. WBC better, fever gone. Negative UA.  Contaminated blood cx.  For now Zosyn/Vanco.    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 I do not think this was from neglect.  I think this was inevitable based on her age, debility and poor nutrition. Surgery consulted, they did a bedside cleaning, and no pus found. I doubt definitive debridement possible. I doubt full healing is possible.  For now Zosyn and Vanco. DC on PO suppressive doxycycline and Augmentin for 10 days.     Severe protein-calorie malnutrition / Cachexia / Hypoalbuminemia / Failure to thrive in adult - POA severe due to dementia and prior CVA.  Nutrition support.  Family states she \"eats great\"    Dementia / Hx of completed stroke / Dysphagia / Anorexia - POA, severe and end stage. Palliative consult.  Hospice will be the only deposition that avoid repeated admissions.  Family is ready now for that meeting. Anticipate discharge to hospice at home.  Family net ready for DNR status yet.    Hypokalemia / Hypophosphatemia - Nutritional.  Replete.    Aspiration pneumonia - POA, possible, based on dysphagia and xray. Zosyn for now. Augmentin at home    Physical debility - POA due to above.  No potential for improvement at rehab.      Hypotension / Hx Hypertension - POA, low BP due to dehydration and sepsis.  Hydrated and better.      Anemia - POA due to chronic disease and as expected it worsened with hydration.       Subjective:     Chief Complaint:  no events    ROS:  (bold if  Culture, Urine [[id number]] Collected: 07/28/24 1212    Order Status: Completed Specimen: Urine, clean catch Updated: 07/29/24 1419     Special Requests NO SPECIAL REQUESTS        Culture No growth (<1,000 CFU/ML)       Blood Culture 2 [[id number]]  (Abnormal) Collected: 07/27/24 2356    Order Status: Completed Specimen: Blood Updated: 07/29/24 1014     Special Requests NO SPECIAL REQUESTS        Culture       PROBABLE Bacteroides fragilis GROWING IN 1 OF 2 BOTTLES DRAWN SITE=RIGHT WRIST            (NOTE) GRAM NEGATIVE RODS WAS CALLED TO AND READ BACK BY GAVIOTA PADILLA RN AT 0852 ON 07/29/2024 BY ZP    Culture, Blood, PCR ID Panel [[id number]]  (Abnormal) Collected: 07/27/24 2353    Order Status: Completed Specimen: Blood Updated: 07/29/24 1013     Accession Number [id number]     Enterococcus faecalis by PCR Not detected        Enterococcus faecium by PCR Not detected        Listeria monocytogenes by PCR Not detected        STAPHYLOCOCCUS Not detected        Staphylococcus Aureus Not detected        Staphylococcus epidermidis by PCR Not detected        Staphylococcus lugdunensis by PCR Not detected        STREPTOCOCCUS Not detected        Streptococcus agalactiae (Group B) Not detected        Strep pneumoniae Not detected        Strep pyogenes,(Grp. A) Not detected        Acinetobacter calcoac baumannii complex by PCR Not detected        Bacteroides fragilis by PCR Detected        Enterobacteriaceae by PCR Not detected        Enterobacter cloacae complex by PCR Not detected        Escherichia Coli Not detected        Klebsiella aerogenes by PCR Not detected        Klebsiella oxytoca by PCR Not detected        Klebsiella pneumoniae group by PCR Not detected        Proteus by PCR Not detected        Salmonella species by PCR Not detected        Serratia marcescens by PCR Not detected        Haemophilus Influenzae by PCR Not detected        Neisseria meningitidis by PCR Not detected        Pseudomonas aeruginosa Not

## 2024-07-30 NOTE — DISCHARGE SUMMARY
Physician Discharge Summary     Patient ID:  Jennifer Goff  [id number]  89 y.o.  8/8/1927    Admit date: 7/27/2024    Discharge date of service and time: 7/31/2024  Greater than 30 minutes were spent providing discharge related services for this patient    Admission Diagnoses: Septicemia (HCC) [A41.9]  Sepsis (HCC) [A41.9]  Pneumonia of right lower lobe due to infectious organism [J18.9]  Pressure injury of skin of sacral region, unspecified injury stage [L89.159]    Discharge Diagnoses:    Principal Diagnosis   Sepsis (HCC)                                             Hospital Course and other diagnoses  Sepsis / Leukocytosis / Fever - POA, presumed due to sacral osteomyelitis and/or PNA. WBC better, fever gone. Negative UA.  Contaminated blood cx.  For now Zosyn/Vanco.    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 I do not think this was from neglect.  I think this was inevitable based on her age, debility and poor nutrition. Surgery consulted, they did a bedside cleaning, and no pus found. I doubt definitive debridement possible. I doubt full healing is possible.  For now Zosyn and Vanco. DC on PO suppressive doxycycline and Augmentin for 10 days.     Severe protein-calorie malnutrition / Cachexia / Hypoalbuminemia / Failure to thrive in adult - POA severe due to dementia and prior CVA.  Nutrition support.     Dementia / Hx of completed stroke / Dysphagia / Anorexia - POA, severe and end stage. Palliative consult.  Hospice will be the only deposition that avoid repeated admissions.   We have arranged discharge to hospice at home.     Hypokalemia / Hypophosphatemia - Nutritional.  Replete.    Aspiration pneumonia - POA, possible, based on dysphagia and xray. Got a course of Zosyn inpatient. Augmentin at home    Physical debility - POA due to above.  No potential for improvement at rehab.      Hypotension / Hx Hypertension - POA, low BP due to dehydration and sepsis.   Hydrated and better.      Anemia - POA due to chronic disease and as expected it worsened with hydration.     PCP: Marli Kearney APRN - NP    Consults: general surgery and hospice    Significant Diagnostic Studies: See Hospital Course    Discharged home in improved condition.    Discharge Exam:  BP: 145/99   Pulse: 100   Resp: 16   Temp: 98.4    TempSrc: Axillary   SpO2: 95%   Weight:  43 kg   Height:  155 cm         Gen:  Cachectic, gravely ill appearing, in no acute distress  HEENT:  Pink conjunctivae, PERRL, hearing intact to voice, moist mucous membranes  Neck:  Supple, without masses, thyroid non-tender  Resp:  No accessory muscle use, clear breath sounds without wheezes rales or rhonchi  Card:  No murmurs, normal S1, S2 without thrills, bruits or peripheral edema  Abd:  Soft, non-tender, non-distended, normoactive bowel sounds are present, no mass  Lymph:  No cervical or inguinal adenopathy  Musc:  No cyanosis or clubbing  Skin:  decubitus ulcers, skin turgor is poor  Neuro:  Cranial nerves are grossly intact, general motor weakness, does not follows commands appropriately  Psych:  Poor insight, not oriented     Patient Instructions:   Current Discharge Medication List        START taking these medications    Details   amoxicillin-clavulanate (AUGMENTIN) 250-62.5 MG/5ML suspension Take 10 mLs by mouth 2 times daily for 10 days  Qty: 200 mL, Refills: 0      doxycycline calcium (VIBRAMYCIN) 50 MG/5ML SYRP syrup Take 10 mLs by mouth every 12 hours for 10 days  Qty: 200 mL, Refills: 0           CONTINUE these medications which have NOT CHANGED    Details   QUEtiapine (SEROQUEL) 25 MG tablet Take 1 tablet by mouth nightly      pantoprazole (PROTONIX) 40 MG tablet Take 1 tablet by mouth daily           STOP taking these medications       amLODIPine (NORVASC) 5 MG tablet Comments:   Reason for Stopping:             Activity: activity as tolerated  Diet: regular diet  Wound Care: keep wound clean and dry and as

## 2024-07-30 NOTE — PROGRESS NOTES
[facility]  [facility]  [phone]    Hospice Nursing PRE-Admission   Discharge Summary  Patient Name: Jennifer Goff  YOB: 1927  Age: 89 y.o.       Date of PLANNED Hospice Admission: 24  Hospice Attending: Dr Hadley  Primary Care Physician: Marli Kearney APRN - NP     Home Hospice Address:[address] [place] [address]    Primary Contact and Phone:Kerri Major [phone]      ADVANCE CARE PLANNING    Code Status: FULL CODE  Durable DNR: []  Yes  [x]  No      2024     2:57 PM   Demographics   Marital Status        Holiness: Orthodoxy   Home: TBD    HOSPICE SUMMARY     Verbal CTI of terminal diagnosis with life expectancy of 6 months or less received from: Dr Hadley    For the Hospice Diagnosis of: PCM    NCD: please address on admission      CLINICAL INFORMATION   Allergies: No Known Allergies      Currently this patient has:  [] Supplemental O2     [] PICC    [] PORT   [] [place] Catheter [] Ostomy  [] NG Tube  [] PEG Tube    [] Wounds           COVID Screening      ASSESSMENT & PLAN     1. Symptom Issues Identified: pain w/ turning and wound care     2. Spiritual Issues  Identified: none identified on assessment     3.  Psych/ Social/ Emotional Issues Identified: Deanna Manning is SIMONA and primary caregivers. There does seem to be some      Acuity Assessment Tool  CLI: *  PSY: *  SPI: *  ENV: *  OTH: *         CARE COORDINATION           Hospice Consents: signed by Kerri JARVIS    2. DME Ordered/Company/Delivery Plan: DME:  lift, OBT, transport chair to be delivered tonight. Has hospital bed in home     3.   Unique home needs for safety: Bariatric patient  NO    4. Symptom Kit and other Medications Needs: [facility]     5. Home Admission Reservation: 10am 24    6.    Transportation by: medical transport   Scheduled for will request CM arrange for 9am          7.    Verbal Report/Handoff given to: preadmit sent  to intake     8. Phone number to Hospital [phone]    9. Supplies/Wound Care: standard supplies

## 2024-07-30 NOTE — PROGRESS NOTES
General Surgery Daily Progress Note    Patient: Jennifer Goff MRN: [id number]  SSN: [id number]    YOB: 1927  Age: 89 y.o.  Sex: female      Admit Date: 7/27/2024    POD * No surgery found *    Procedure: * No surgery found *    Subjective:   Seen and examined on rounds. Dressing changed this morning. No complaints of pain.     Current Facility-Administered Medications   Medication Dose Route Frequency    dextrose 5 % and 0.45 % NaCl with KCl 40 mEq infusion   IntraVENous Continuous    potassium phosphate 15 mmol in sodium chloride 0.9 % 250 mL IVPB  15 mmol IntraVENous Once    vancomycin (VANCOCIN) 500 mg in sodium chloride 0.9 % 100 mL IVPB (mini-bag)  500 mg IntraVENous Q12H    polyethylene glycol (GLYCOLAX) packet 17 g  17 g Oral BID    collagenase ointment   Topical Daily    balsum peru-castor oil (VENELEX) ointment   Topical BID    sodium chloride flush 0.9 % injection 5-40 mL  5-40 mL IntraVENous 2 times per day    sodium chloride flush 0.9 % injection 5-40 mL  5-40 mL IntraVENous PRN    0.9 % sodium chloride infusion   IntraVENous PRN    heparin (porcine) injection 5,000 Units  5,000 Units SubCUTAneous BID    ondansetron (ZOFRAN-ODT) disintegrating tablet 4 mg  4 mg Oral Q8H PRN    Or    ondansetron (ZOFRAN) injection 4 mg  4 mg IntraVENous Q6H PRN    acetaminophen (TYLENOL) tablet 650 mg  650 mg Oral Q6H PRN    pantoprazole (PROTONIX) tablet 40 mg  40 mg Oral QAM AC    QUEtiapine (SEROQUEL) tablet 25 mg  25 mg Oral QHS    piperacillin-tazobactam (ZOSYN) 3,375 mg in sodium chloride 0.9 % 50 mL IVPB (mini-bag)  3,375 mg IntraVENous q8h        Objective:   No intake/output data recorded.  07/28 1901 - 07/30 0700  In: 4473 [P.O.:220; I.V.:3780.4]  Out: 600 [Urine:600]  Patient Vitals for the past 8 hrs:   BP Temp Temp src Pulse Resp SpO2   07/30/24 0747 (!) 123/50 98.4 °F (36.9 °C) Axillary 81 14 96 %   07/30/24 0327 99/61 98.1 °F (36.7 °C) Axillary 83 16 91 %       Physical

## 2024-07-30 NOTE — CARE COORDINATION
CM notified of Pt discharging home with [facility] on tomorrow 7/31. CM was requested to arrange transportation for 9am tomorrow.     CM arranged BLS transport through [facility] for 9am.       Nursing and attending notified.       CHRISTIANNE Mcelroy, [facility] Care Manager  [phone]

## 2024-07-30 NOTE — DISCHARGE INSTRUCTIONS
HOSPITALIST DISCHARGE INSTRUCTIONS  NAME:  Jennifer Goff   :  1927   MRN:  [id number]     Date/Time:  2024 1:14 PM    ADMIT DATE: 2024     DISCHARGE DATE: 2024     DISCHARGE DIAGNOSIS:  Sepsis    DISCHARGE INSTRUCTIONS:  Thank you for allowing us to participate in your care. Your discharging Hospitalist is Ganga Mitchell MD. You were admitted for evaluation and treatment of the following:    Sepsis - This was due to sacral osteomyelitis. We gave antibiotics here, and at home you will take doxycyline and Augmentin.     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 I do not think this was from neglect.  I think this was inevitable based on your age, debility and poor nutrition. Surgery was consulted, they did a bedside cleaning. There is no further surgery possible. Complete healing is not possible.  At home take suppressive doxycycline and Augmentin for 10 days.      Severe protein-calorie malnutrition / Cachexia - This is severe due to dementia and prior stroke.  Continue nutrition support for comfort     Dementia / Hx of completed stroke / Dysphagia - You have severe end stage disease. Hospice will be the only deposition that avoid repeated admissions.  We have arranged discharge to hospice at home.      Aspiration pneumonia - It is possible you have some aspiration. Take Augmentin at home     Physical debility - Severe. You have no potential for improvement at rehab.      Hypotension - Your blood pressure was low due to dehydration and sepsis.       Anemia - this is due to chronic disease.      MEDICATIONS:    It is important that you take the medication exactly as they are prescribed.   Keep your medication in the bottles provided by the pharmacist and keep a list of the medication names, dosages, and times to be taken in your wallet.   Do not take other medications without consulting your doctor.     If you experience any of the following

## 2024-07-30 NOTE — PLAN OF CARE
Problem: Skin/Tissue Integrity  Goal: Absence of new skin breakdown  Description: 1.  Monitor for areas of redness and/or skin breakdown  2.  Assess vascular access sites hourly  3.  Every 4-6 hours minimum:  Change oxygen saturation probe site  4.  Every 4-6 hours:  If on nasal continuous positive airway pressure, respiratory therapy assess nares and determine need for appliance change or resting period.  Outcome: Progressing     Problem: Safety - Adult  Goal: Free from fall injury  Outcome: Progressing     Problem: Discharge Planning  Goal: Discharge to home or other facility with appropriate resources  Outcome: Progressing  Flowsheets (Taken 7/29/2024 2130)  Discharge to home or other facility with appropriate resources: Identify discharge learning needs (meds, wound care, etc)     Problem: Pain  Goal: Verbalizes/displays adequate comfort level or baseline comfort level  Outcome: Progressing     Problem: Chronic Conditions and Co-morbidities  Goal: Patient's chronic conditions and co-morbidity symptoms are monitored and maintained or improved  Outcome: Progressing  Flowsheets (Taken 7/29/2024 2130)  Care Plan - Patient's Chronic Conditions and Co-Morbidity Symptoms are Monitored and Maintained or Improved: Monitor and assess patient's chronic conditions and comorbid symptoms for stability, deterioration, or improvement     Problem: Nutrition Deficit:  Goal: Optimize nutritional status  Outcome: Progressing

## 2024-07-31 ENCOUNTER — HOME CARE VISIT (OUTPATIENT)
Facility: HOME HEALTH | Age: 89
End: 2024-07-31
Payer: MEDICARE

## 2024-07-31 VITALS
BODY MASS INDEX: 18.46 KG/M2 | OXYGEN SATURATION: 97 % | HEART RATE: 100 BPM | HEIGHT: 60 IN | RESPIRATION RATE: 16 BRPM | WEIGHT: 94 LBS

## 2024-07-31 VITALS
TEMPERATURE: 99.9 F | BODY MASS INDEX: 17.94 KG/M2 | DIASTOLIC BLOOD PRESSURE: 70 MMHG | SYSTOLIC BLOOD PRESSURE: 144 MMHG | WEIGHT: 95 LBS | OXYGEN SATURATION: 94 % | RESPIRATION RATE: 20 BRPM | HEIGHT: 61 IN | HEART RATE: 84 BPM

## 2024-07-31 PROCEDURE — 6360000002 HC RX W HCPCS: Performed by: HOSPITALIST

## 2024-07-31 PROCEDURE — 94761 N-INVAS EAR/PLS OXIMETRY MLT: CPT

## 2024-07-31 PROCEDURE — 0651 HSPC ROUTINE HOME CARE

## 2024-07-31 PROCEDURE — G0299 HHS/HOSPICE OF RN EA 15 MIN: HCPCS

## 2024-07-31 PROCEDURE — 2580000003 HC RX 258: Performed by: HOSPITALIST

## 2024-07-31 RX ADMIN — VANCOMYCIN HYDROCHLORIDE 500 MG: 500 INJECTION, POWDER, LYOPHILIZED, FOR SOLUTION INTRAVENOUS at 06:25

## 2024-07-31 ASSESSMENT — ENCOUNTER SYMPTOMS
STOOL DESCRIPTION: SOFT
CONSTIPATION: 1
BOWEL INCONTINENCE: 1
TROUBLE SWALLOWING: 1

## 2024-07-31 ASSESSMENT — PAIN SCALES - GENERAL
PAINLEVEL_OUTOF10: 0
PAINLEVEL_OUTOF10: 0

## 2024-07-31 NOTE — ACP (ADVANCE CARE PLANNING)
Copy of POA document dated 3/16/2010 was provided by family and has been scanned into medical record.  Document pertains to finances, does not authorize medical decision making.  In absence of verified Medical POA, pt's adult children would have equal decision making authority if pt is unable to speak for herself.

## 2024-07-31 NOTE — HOSPICE
Jennifer Goff  [birth year]  Age: 89                                                       Principle Hospice Diagnosis: protein calorie malnutrition   Diagnoses RELATED to the terminal prognosis: dysphagia, dementia , sepsis, aspiration pneumonia, presence of an unstageable pressure ulcers, past CVA   Other Diagnoses:     Date of Hospice Admission: 24  Hospice Attending Elected by Patient:   Primary Care Physician: Marli Kearney NP     Admitting RN: CLARENCE Rivera CM: Chris   : Lesley   :  declined       Durable DNR:     No---  patient is a full code     Home:  [place] in [place]    Direct Observation: Arrived at the home, met by daughter Kerri and granddaughter Lenore who are primary caregivers. Pt in bed, opens eyes to voice. PPS 30, PAINAD 5, FAST 7C. Pt easily irritated with any care, calling out, scratching RN and family members, expressing her displeasure verbally. Family asked what might be appropriate for her when she behaves this way. I offered suggestion of something to calm her, cause sedation, and they declined. She is currently on Seroquel in the evening. She settles when not stimulated. Pt in a contracted posture, resisted BP and in- depth assessment. Oxygen level in normal range, no respiratory signs. Small BM on admission, assisted family on changing brief and hygiene provided. Abdomen soft, active bowel sounds, puree diet was reviewed. Currently pt has scant po intake though family says she will take meds ok. Pt has several unstageable wounds (see charting in LDA section of chart). Dressings are intact at this time. Pt has DME In place. Long discussion of rad lift, disease progression and comfort focus of hospice care. EMMA Manning states pt is a full code for now. She stated there is disagreement in the family so she has decided to seek aggressive care if needed. Educated on situations where revocation would occur if pt is hospitalized. Consents were obtained prior to

## 2024-07-31 NOTE — PROGRESS NOTES
[facility]    Hospitalist Progress Note    NAME: Jennifer Goff   :  1927  MRM:  [id number]    Date/Time of service 2024  7:38 AM    To assist coordination of care and communication with nursing and staff, this note may be preliminary early in the day, but finalized by end of the day.        Assessment and Plan:     Sepsis / Leukocytosis / Fever - POA, presumed due to sacral osteomyelitis and/or PNA. WBC better, fever gone. Negative UA.  Contaminated blood cx.  For now Zosyn/Vanco.    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 I do not think this was from neglect.  I think this was inevitable based on her age, debility and poor nutrition. Surgery consulted, they did a bedside cleaning, and no pus found. I doubt definitive debridement possible. I doubt full healing is possible.  For now Zosyn and Vanco. DC on PO suppressive doxycycline and Augmentin for 10 days.     Severe protein-calorie malnutrition / Cachexia / Hypoalbuminemia / Failure to thrive in adult - POA severe due to dementia and prior CVA.  Nutrition support.     Dementia / Hx of completed stroke / Dysphagia / Anorexia - POA, severe and end stage. Palliative consult.  Hospice will be the only deposition that avoid repeated admissions.   We have arranged discharge to hospice at home.     Hypokalemia / Hypophosphatemia - Nutritional.  Replete.    Aspiration pneumonia - POA, possible, based on dysphagia and xray. Got a course of Zosyn inpatient. Augmentin at home    Physical debility - POA due to above.  No potential for improvement at rehab.      Hypotension / Hx Hypertension - POA, low BP due to dehydration and sepsis.  Hydrated and better.      Anemia - POA due to chronic disease and as expected it worsened with hydration.       Subjective:     Chief Complaint:  no events    ROS:  (bold if positive, if negative)    Not Tolerating PT  Tolerating some diet        Objective:  flush 0.9 % injection 5-40 mL  5-40 mL IntraVENous 2 times per day    sodium chloride flush 0.9 % injection 5-40 mL  5-40 mL IntraVENous PRN    0.9 % sodium chloride infusion   IntraVENous PRN    heparin (porcine) injection 5,000 Units  5,000 Units SubCUTAneous BID    ondansetron (ZOFRAN-ODT) disintegrating tablet 4 mg  4 mg Oral Q8H PRN    Or    ondansetron (ZOFRAN) injection 4 mg  4 mg IntraVENous Q6H PRN    acetaminophen (TYLENOL) tablet 650 mg  650 mg Oral Q6H PRN    pantoprazole (PROTONIX) tablet 40 mg  40 mg Oral QAM AC    QUEtiapine (SEROQUEL) tablet 25 mg  25 mg Oral QHS    piperacillin-tazobactam (ZOSYN) 3,375 mg in sodium chloride 0.9 % 50 mL IVPB (mini-bag)  3,375 mg IntraVENous q8h        Lab Data Reviewed: (see below)  Lab Review:     Recent Labs     07/29/24  0133 07/30/24  0406   WBC 15.2* 11.2*   HGB 8.4* 8.1*   HCT 26.2* 26.7*   * 379       Recent Labs     07/29/24  0133 07/30/24  0406    147*   K Hemolyzed, Recollection Recommended 3.0*   * 118*   CO2 25 23   GLUCOSE 117* 110*   BUN 12 7   CREATININE 0.53* 0.53*   CALCIUM 8.0* 7.7*   MG Hemolyzed, Recollection Recommended 1.7   PHOS 2.5* 2.0*   AST Hemolyzed, Recollection Recommended 26   ALT 16 16       Lab Results   Component Value Date/Time    GLUCOSE 110 07/30/2024 04:06 AM    GLUCOSE 117 07/29/2024 01:33 AM    GLUCOSE 126 07/27/2024 11:53 PM    GLUCOSE 56 06/23/2021 07:04 AM    GLUCOSE 75 06/22/2021 02:10 PM     No results for input(s): \"PH\", \"PCO2\", \"PO2\", \"HCO3\", \"FIO2\" in the last 72 hours.  No results for input(s): \"INR\" in the last 72 hours.  Results       Procedure Component Value Units Date/Time    Culture, Urine [[id number]] Collected: 07/28/24 1212    Order Status: Completed Specimen: Urine, clean catch Updated: 07/29/24 1419     Special Requests NO SPECIAL REQUESTS        Culture No growth (<1,000 CFU/ML)       Blood Culture 2 [[id number]]  (Abnormal) Collected: 07/27/24 0116    Order Status: Completed Specimen:

## 2024-07-31 NOTE — PROGRESS NOTES
Patient received a discharge order. The patient's daughters were made aware of the discharge and agreed to the patient going home. The patient's IV's were removed without any bleeding or complications. The patient's daughter was given discharge instructions which included new, changed, and discontinued medication, antibiotic precautions, where they can  their prescriptions, side effects of new medications, follow up appointments, and signs and symptoms of heart attack and stroke.The patient's belongings were all packed up and the patient verified they had everything that belongs to them. The attending provider came in just prior to transport to answer any last questions the family might have. [facility] arrived to take the patient home. The patient was disconnected from the purewick, wiped down, and placed in a clean, dry brief for the ride home. The PCT gathered up the remaining patient supplies to send home with the family. Current vital signs were gathered for the transport team. The transport team loaded the patient onto the stretcher. The patient was wheeled out, vital signs were stable at time of discharge.

## 2024-07-31 NOTE — PLAN OF CARE
Problem: Skin/Tissue Integrity  Goal: Absence of new skin breakdown  Description: 1.  Monitor for areas of redness and/or skin breakdown  2.  Assess vascular access sites hourly  3.  Every 4-6 hours minimum:  Change oxygen saturation probe site  4.  Every 4-6 hours:  If on nasal continuous positive airway pressure, respiratory therapy assess nares and determine need for appliance change or resting period.  Outcome: Progressing     Problem: Safety - Adult  Goal: Free from fall injury  Outcome: Progressing     Problem: Discharge Planning  Goal: Discharge to home or other facility with appropriate resources  Outcome: Progressing  Flowsheets (Taken 7/30/2024 2115)  Discharge to home or other facility with appropriate resources: Identify discharge learning needs (meds, wound care, etc)     Problem: Pain  Goal: Verbalizes/displays adequate comfort level or baseline comfort level  Outcome: Progressing     Problem: Chronic Conditions and Co-morbidities  Goal: Patient's chronic conditions and co-morbidity symptoms are monitored and maintained or improved  Outcome: Progressing  Flowsheets (Taken 7/30/2024 2115)  Care Plan - Patient's Chronic Conditions and Co-Morbidity Symptoms are Monitored and Maintained or Improved: Monitor and assess patient's chronic conditions and comorbid symptoms for stability, deterioration, or improvement     Problem: Nutrition Deficit:  Goal: Optimize nutritional status  Outcome: Progressing

## 2024-08-01 ENCOUNTER — HOME CARE VISIT (OUTPATIENT)
Facility: HOME HEALTH | Age: 89
End: 2024-08-01
Payer: MEDICARE

## 2024-08-01 PROCEDURE — G0299 HHS/HOSPICE OF RN EA 15 MIN: HCPCS

## 2024-08-01 PROCEDURE — 0651 HSPC ROUTINE HOME CARE

## 2024-08-01 PROCEDURE — G0156 HHCP-SVS OF AIDE,EA 15 MIN: HCPCS

## 2024-08-02 ENCOUNTER — HOME CARE VISIT (OUTPATIENT)
Age: 89
End: 2024-08-02
Payer: MEDICARE

## 2024-08-02 ENCOUNTER — HOME CARE VISIT (OUTPATIENT)
Facility: HOME HEALTH | Age: 89
End: 2024-08-02
Payer: MEDICARE

## 2024-08-02 ENCOUNTER — HOME HEALTH/ HOSPICE FACE TO FACE (OUTPATIENT)
Age: 89
End: 2024-08-02

## 2024-08-02 VITALS
TEMPERATURE: 97.8 F | OXYGEN SATURATION: 94 % | HEART RATE: 81 BPM | DIASTOLIC BLOOD PRESSURE: 66 MMHG | SYSTOLIC BLOOD PRESSURE: 145 MMHG | RESPIRATION RATE: 22 BRPM

## 2024-08-02 VITALS
SYSTOLIC BLOOD PRESSURE: 130 MMHG | HEART RATE: 88 BPM | TEMPERATURE: 98.2 F | DIASTOLIC BLOOD PRESSURE: 64 MMHG | OXYGEN SATURATION: 96 % | RESPIRATION RATE: 20 BRPM

## 2024-08-02 PROCEDURE — 0651 HSPC ROUTINE HOME CARE

## 2024-08-02 PROCEDURE — G0299 HHS/HOSPICE OF RN EA 15 MIN: HCPCS

## 2024-08-02 ASSESSMENT — ENCOUNTER SYMPTOMS
DIARRHEA: 1
DIARRHEA: 1

## 2024-08-02 NOTE — FACE TO FACE
[facility]   Good Help to Those in Need  FACE TO FACE ENCOUNTER  [phone]     Patient Name:  Jennifer Goff  YOB: 1927         Date of Provider Hospice Visit: 08/02/24        Location of Visit: Home       Principle Hospice Diagnosis:   PCM  Diagnoses RELATED to the terminal prognosis: dysphagia, dementia with behaviors, sepsis, aspiration pneumonia, presence of an unstageable pressure ulcer, CVA   Other Diagnoses: n/a    Benefit period number: 7  First day of this BP benefit period: 7/31/2024     HOSPICE SUMMARY   Jennifer Goff is 89 yo AA female being evaluated for hospice readmission. She was previously on service with [facility] with SOC 8/20/2019, with revocation on 1/4/2020. Was re-admitted on 6/25/2021, and again revoked on 2/21/2022. Was admitted to [facility] on routine level of care at home on 7/31/2024 in BP #7.     Review of records indicates that she was recently hospitalized 7/27/2024 through 7/31/2024 for treatment of sepsis secondary to PNA, likely aspiration; and subacute osteomyelitis of unstageable sacral wound. Severe PCM and adult failure to thrive with total protein 5.6, and albumin 1.7. Family reports that at baseline she is seemingly oriented to self as she will respond to name. But level of alertness and engagement varies from day to day. At times she can follow simple commands and make needs known; but not consistently. They state she can be verbal \"when she wants to\". Can become easily agitated and resistant to care. Has historically scratched and swatted at family members and hospice team members. Receives Seroquel 25 mg po at HS but family is ok with daytime behavior and does not wish to give something that can be sedating as her sleep patterns are already widely variable. She is mostly bed bound; but family does occasionally move her to the couch. Increased tone to all extremities and LE contracted R>L. She is dependent for bathing,

## 2024-08-02 NOTE — HOSPICE
89 yo F with Principle Hospice Diagnosis: protein calorie malnutrition; Diagnoses RELATED to the terminal prognosis: dysphagia, dementia , sepsis, aspiration pneumonia, presence of an unstageable pressure ulcers, past CVA. Pt found at home in bed being cared for by her daughter and grand-daughter. Per family, pt is eating 3 soft meals a day with snacks in between and is drinking frequently. Pt has no teeth and family provides only soft food she doesn't need to chew. It was reported that she has no issues with swallowing. Family does exercise aspiration precautions when feeding. Pt sleeps during the night and during the majority of day. Pt has had muliptle episodes of diarrhea per day most likely due to her antibiotic therapy. Family does acknowledge issues with pt acting out by yelling at and scratching careproviders. She had just scratched the HHA today and the admission nurse yesterday. Family has been hesitant in medicating patient because they feel this is her baseline and they're used to it. It was emphasized that when pt is acting aggressively it is most likely due to an agitated and uncomfortable state that can be mitigated with medication. Pt was compliant with today's assessment: VS - 97.8, 145/66, 81, 22, 94% on RA. Pt is A&O x 0, S1S2 present, lungs sounds diminished throughout. Bilateral upper and lower pulses palpable but weak. Abd soft and non tender. Motor function and sensory is intact. Pt has an unstagable sacral pressure ulcer which was cleaned and bandaged. Family taught how to perform wound care.Pt is incontinent of urine and stool. Pt has bilarteral hip contractures that were stretched for ROM. SRK medication education provided to shayer, grand daughter, and caregiver. Medication were reviewed and reconcilled and allergies reviewed via [place]. No medications needed this visit. Supplies ordered include gauze bandages, sacral meplex, triad cream, chucks, wipes. Hospice 24/7 availability was

## 2024-08-02 NOTE — HOSPICE
89 yo F with [facility] Diagnosis: protein calorie malnutrition; Diagnoses RELATED to the terminal prognosis: dysphagia, dementia , sepsis, aspiration pneumonia, presence of an unstageable pressure ulcers, past CVA. Pt found at home in bed being cared for by her daughter and grand-daughter. Per family, pt is eating 3 soft meals a day with snacks in between and is drinking frequently. Pt has no teeth and family provides only soft food she doesn't need to chew. It was reported that she has no issues with swallowing. Family does exercise aspiration precautions when feeding. Pt sleeps during the night and during the majority of day. Pt has had muliptle episodes of diarrhea per day most likely due to her antibiotic therapy. Family does acknowledge issues with pt acting out by yelling at and scratching careproviders. She had just scratched the HHA today and the admission nurse yesterday. Family has been hesitant in medicating patient because they feel this is her baseline and they're used to it. It was emphasized that when pt is acting aggressively it is most likely due to an agitated and uncomfortable state that can be mitigated with medication. Pt was compliant with today's assessment: VS WNL. Pt is A&O x 0, S1S2 present, lungs sounds diminished throughout. Bilateral upper and lower pulses palpable but weak. Abd soft and non tender. Motor function and sensory is intact. Pt has an unstagable sacral pressure ulcer.Pt is incontinent of urine and stool. Pt has bilarteral hip contractures that were stretched for ROM. SRK medication education provided to shayer, grand daughter, and caregiver. Medication were reviewed and reconcilled and allergies reviewed via [place]. No medications needed this visit. Supplies ordered include gauze bandages, sacral meplex, triad cream, chucks, wipes. Hospice 24/7 availability was emphasized and contact numbers were provided. Pt left with family sleeping comfortably and showing on sighns

## 2024-08-03 LAB
BACTERIA SPEC CULT: NORMAL
SERVICE CMNT-IMP: NORMAL

## 2024-08-03 PROCEDURE — 0651 HSPC ROUTINE HOME CARE

## 2024-08-04 PROCEDURE — 0651 HSPC ROUTINE HOME CARE

## 2024-08-05 ENCOUNTER — HOME CARE VISIT (OUTPATIENT)
Facility: HOME HEALTH | Age: 89
End: 2024-08-05
Payer: MEDICARE

## 2024-08-05 PROCEDURE — G0156 HHCP-SVS OF AIDE,EA 15 MIN: HCPCS

## 2024-08-06 ENCOUNTER — HOME CARE VISIT (OUTPATIENT)
Age: 89
End: 2024-08-06
Payer: MEDICARE

## 2024-08-06 VITALS — RESPIRATION RATE: 14 BRPM

## 2024-08-06 PROCEDURE — G0156 HHCP-SVS OF AIDE,EA 15 MIN: HCPCS

## 2024-08-06 PROCEDURE — G0299 HHS/HOSPICE OF RN EA 15 MIN: HCPCS

## 2024-08-06 NOTE — HOSPICE
PRN visit for dressings had come off with hygiene/bath. Called by our CNA Kelly. Pt in bed, cooperative, tired per family. PPS 30, FAST 7D, PAINAD zero. She cooperated with wound care, no aggression or outbursts. Heels with DTI injuries, but dry. One dressing to heel removed with no underlying wound found. Heels left open to air, floated, and family is instructed on this and have been diligent in pressure relieving techniques to these areas. Sacral wound- washed with wound cleanser, packed with slightly moist VASHE soaked guaze, covered with border foam dressing. Pt is dry, clean brief on having just had her cna visit. Family is appreciative of care and will call with any further needs. Will update . Daughter Kerri asked about respite plan. Forwarded a message to TANESHA Sutton for follow up.

## 2024-08-07 ENCOUNTER — HOME CARE VISIT (OUTPATIENT)
Age: 89
End: 2024-08-07
Payer: MEDICARE

## 2024-08-07 ENCOUNTER — HOME CARE VISIT (OUTPATIENT)
Facility: HOME HEALTH | Age: 89
End: 2024-08-07
Payer: MEDICARE

## 2024-08-07 PROCEDURE — G0155 HHCP-SVS OF CSW,EA 15 MIN: HCPCS

## 2024-08-08 ENCOUNTER — HOME CARE VISIT (OUTPATIENT)
Age: 89
End: 2024-08-08
Payer: MEDICARE

## 2024-08-08 ENCOUNTER — HOME CARE VISIT (OUTPATIENT)
Facility: HOME HEALTH | Age: 89
End: 2024-08-08
Payer: MEDICARE

## 2024-08-08 VITALS
RESPIRATION RATE: 16 BRPM | SYSTOLIC BLOOD PRESSURE: 152 MMHG | DIASTOLIC BLOOD PRESSURE: 83 MMHG | TEMPERATURE: 98 F | HEART RATE: 91 BPM | OXYGEN SATURATION: 95 %

## 2024-08-08 PROCEDURE — G0299 HHS/HOSPICE OF RN EA 15 MIN: HCPCS

## 2024-08-08 PROCEDURE — G0156 HHCP-SVS OF AIDE,EA 15 MIN: HCPCS

## 2024-08-08 NOTE — HOSPICE
Arrived at patient's residence, greeted by patient's daughter and granddaughter. Patient laying in bed, laying right side. Patient opened her eyes and spoke a few words. Granddaughter reports patient is eating and drinking okay, favors applesauce and pudding. Daughter reports having family coming over today to celebrate patient's birthday today. BP was elevated all other VS are WNL, see assessment. Skin is cool to the touch, abd. is soft, non-tender, no distention. Patient is incontinent to bowel and bladder, considering a yeung cath. to keep wound dry. Provided wound care to sacrum, sent pictures to Mirella Mcgrath NP for new orders; ordered Silvasord gel and pack loosely with Rolled gauze. Family agreed with new orders. Granddaughter reports antibiotic will be completed today. No medication refills are needed. Supplies are ordered. Turned patient for comfort and elevated head for family will be feeding patient. Reviewed medications and aspiration precautions with daughter and granddaughter.  [facility] Order Number : [id number]

## 2024-08-12 ENCOUNTER — HOME CARE VISIT (OUTPATIENT)
Facility: HOME HEALTH | Age: 89
End: 2024-08-12
Payer: MEDICARE

## 2024-08-12 VITALS
OXYGEN SATURATION: 96 % | RESPIRATION RATE: 18 BRPM | SYSTOLIC BLOOD PRESSURE: 105 MMHG | TEMPERATURE: 97.7 F | HEART RATE: 83 BPM | DIASTOLIC BLOOD PRESSURE: 62 MMHG

## 2024-08-12 PROCEDURE — G0156 HHCP-SVS OF AIDE,EA 15 MIN: HCPCS

## 2024-08-12 PROCEDURE — G0299 HHS/HOSPICE OF RN EA 15 MIN: HCPCS

## 2024-08-12 ASSESSMENT — ENCOUNTER SYMPTOMS
SKIN LESIONS: 1
CONSTIPATION: 1

## 2024-08-12 NOTE — HOSPICE
89 yo F with Principle Hospice Diagnosis: protein calorie malnutrition; Diagnoses RELATED to the terminal prognosis: dysphagia, dementia , sepsis, aspiration pneumonia, presence of an unstageable pressure ulcers, past CVA. Pt found at home in bed sleeping deeply while being cared for by her daughter and grand-daughter. Per family, pt is eating 3 soft meals a day with snacks in between and is drinking frequently. Pt has no teeth and family provides only soft food she doesn't need to chew.She has no issues with swallowing. Family does exercise aspiration precautions when feeding. Pt sleeps during the night and during the majority of day. Pt has not had a BM in over a week - suppository provided. Family does acknowledge issues with pt acting out by yelling at and scratching careproviders. Pt was compliant with today's assessment: VS - 97.7, 105/62, 83, 18, 96% on RA. Pt is A&O x 0, S1S2 present, lungs sounds diminished in bases. Bilateral upper and lower pulses palpable but weak. Abd distened with hyperactive BS. Motor function and sensory is intact. Pt has an unstagable sacral pressure ulcer which was cleaned and bandaged. Wound care order is silvasord gel applied q 72 hours with gauze and sacral meplix. The gel had not arrived to house yet. Pt is incontinent of urine and stool. Pt has bilarteral hip contractures along with bilateral heel pressure injuries. SRK medication education provided to shayer and grand daughter. Medication were reviewed and reconcilled and allergies reviewed via [place]. No medications needed this visit. Supplies ordered include 3x3 meplex, large diapers, soap. Hospice 24/7 availability was emphasized and contact numbers were provided. Pt left with family sleeping comfortably and showing on sighns of distress or discomfort.

## 2024-08-13 ENCOUNTER — HOME CARE VISIT (OUTPATIENT)
Age: 89
End: 2024-08-13
Payer: MEDICARE

## 2024-08-13 PROCEDURE — G0156 HHCP-SVS OF AIDE,EA 15 MIN: HCPCS

## 2024-08-14 ENCOUNTER — HOME CARE VISIT (OUTPATIENT)
Age: 89
End: 2024-08-14
Payer: MEDICARE

## 2024-08-17 NOTE — PROGRESS NOTES
Physician Progress Note      PATIENT:               DION JONES  [facility] #:                  [id number]  :                       1927  ADMIT DATE:       2024 11:27 PM  DISCH DATE:        2024 9:51 AM  RESPONDING  PROVIDER #:        Anum Alonso MD          QUERY TEXT:    *****ATTENTION GENERAL SURGERY , DR. ANUM ALONSO*******    This patient admitted for Sepsis due to Sacral Ulcer.    On 2024- General surgery consulted, and notes \"Wound debrided at   bedside\".    To accurately reflect the procedure performed please document if debridement   was excisional or nonexcisional and the deepest depth of tissue removed as   down to and including:    The medical record reflects the following:  Risk Factors: Hx of CVA, severe dementia, Sacral wound, Severe protein calorie   Malnutrition  Clinical Indicators: On 2024- General surgery notes CTA with fluid   beneath escar and air tracking to sacrum. Wound debrided at bedside. No fluid   or purulence encountered and debridement not aggressive.  Covered with   antibacterial wet to dry and mipelex foam.  Treatment: General surgery consulted. Bedside debridement, IV zosyn and IV   vanco, Wound care,    Thank you  Saloni Harris, CHRISTIANNEN,RN, CPHQ, CCDS, SMART  Options provided:  -- Nonexcisional debridement of skin  -- Excisional debridement of skin  -- Nonexcisional debridement of subcutaneous tissue  -- Excisional debridement of subcutaneous tissue  -- Nonexcisional debridement of fascia  -- Excisional debridement of fascia  -- Nonexcisional debridement of muscle  -- Excisional debridement of muscle  -- Nonexcisional debridement of bone  -- Excisional debridement of bone  -- Other - I will add my own diagnosis  -- Disagree - Not applicable / Not valid  -- Disagree - Clinically unable to determine / Unknown  -- Refer to Clinical Documentation Reviewer    PROVIDER RESPONSE TEXT:    Excisional debridement of skin on sacral ulcer was preformed at [facility] on   July

## 2024-08-20 ENCOUNTER — HOME CARE VISIT (OUTPATIENT)
Age: 89
End: 2024-08-20
Payer: MEDICARE

## 2024-08-20 ENCOUNTER — HOME CARE VISIT (OUTPATIENT)
Facility: HOME HEALTH | Age: 89
End: 2024-08-20
Payer: MEDICARE

## 2024-08-20 VITALS
TEMPERATURE: 97 F | RESPIRATION RATE: 20 BRPM | OXYGEN SATURATION: 96 % | HEART RATE: 71 BPM | SYSTOLIC BLOOD PRESSURE: 160 MMHG | DIASTOLIC BLOOD PRESSURE: 82 MMHG

## 2024-08-20 PROCEDURE — G0156 HHCP-SVS OF AIDE,EA 15 MIN: HCPCS

## 2024-08-20 PROCEDURE — G0299 HHS/HOSPICE OF RN EA 15 MIN: HCPCS

## 2024-08-20 NOTE — HOSPICE
89 yo F with Principle Hospice Diagnosis: protein calorie malnutrition; Diagnoses RELATED to the terminal prognosis: dysphagia, dementia , sepsis, aspiration pneumonia, presence of an unstageable pressure ulcers, past CVA. Pt found at home in bed sleeping deeply while being cared for by her daughter. She had just returned from respite care at the hospice house yesterday. Pt slept through today's assessment: VS - 97, 160/82, 71, 20, 96% on RA. Pt is A&O x 0, S1S2 present, lungs sounds diminished in bases. Bilateral upper and lower pulses palpable but weak. Abd soft with normoactive BS. Motor function and sensory is intact. Pt has an unstagable sacral pressure ulcer which was cleaned and bandaged per orders. Pt is incontinent of urine and stool. Pt has bilarteral hip contractures along with bilateral heel pressure injuries.  Medication were reviewed and reconcilled and allergies reviewed via [place]. No medications needed this visit. Hospice 24/7 availability was emphasized and contact numbers were provided. Pt left with family sleeping comfortably and showing on sighns of distress or discomfort with daughter.

## 2024-08-22 ENCOUNTER — HOME CARE VISIT (OUTPATIENT)
Facility: HOME HEALTH | Age: 89
End: 2024-08-22
Payer: MEDICARE

## 2024-08-22 PROCEDURE — G0156 HHCP-SVS OF AIDE,EA 15 MIN: HCPCS

## 2024-08-23 ENCOUNTER — HOME CARE VISIT (OUTPATIENT)
Facility: HOME HEALTH | Age: 89
End: 2024-08-23
Payer: MEDICARE

## 2024-08-23 VITALS
DIASTOLIC BLOOD PRESSURE: 74 MMHG | SYSTOLIC BLOOD PRESSURE: 146 MMHG | OXYGEN SATURATION: 97 % | HEART RATE: 78 BPM | RESPIRATION RATE: 20 BRPM

## 2024-08-23 PROCEDURE — G0299 HHS/HOSPICE OF RN EA 15 MIN: HCPCS

## 2024-08-23 NOTE — HOSPICE
89 yo F with [facility] Hosp[facility] Diagnosis: protein calorie malnutrition; Diagnoses RELATED to the terminal prognosis: dysphagia, dementia , sepsis, aspiration pneumonia, presence of an unstageable pressure ulcers, past CVA. Pt found at home in bed sleeping deeply while being cared for by her daughter. Pt slept through today's assessment: VS - 96.7, 146/74, 78, 20, 97% on RA. Pt is A&O x 0, S1S2 present, lungs sounds diminished in bases. Bilateral upper and lower pulses palpable but weak. Abd soft with normoactive BS. Motor function and sensory is intact. Pt has an unstagable sacral pressure ulcer which was cleaned and bandaged per orders. Pt is incontinent of urine and stool. Pt has bilarteral hip contractures along with bilateral heel pressure injuries.  Medication were reviewed and reconcilled and allergies reviewed via [place]. No medications needed this visit. Hospice 24/7 availability was emphasized and contact numbers were provided. Pt left with family sleeping comfortably and showing on sighns of distress or discomfort with daughter.

## 2024-08-26 ENCOUNTER — HOME CARE VISIT (OUTPATIENT)
Facility: HOME HEALTH | Age: 89
End: 2024-08-26
Payer: MEDICARE

## 2024-08-26 PROCEDURE — G0299 HHS/HOSPICE OF RN EA 15 MIN: HCPCS

## 2024-08-26 PROCEDURE — G0156 HHCP-SVS OF AIDE,EA 15 MIN: HCPCS

## 2024-08-27 ENCOUNTER — HOME CARE VISIT (OUTPATIENT)
Facility: HOME HEALTH | Age: 89
End: 2024-08-27
Payer: MEDICARE

## 2024-08-27 VITALS
SYSTOLIC BLOOD PRESSURE: 146 MMHG | HEART RATE: 87 BPM | RESPIRATION RATE: 22 BRPM | OXYGEN SATURATION: 98 % | TEMPERATURE: 97.9 F | DIASTOLIC BLOOD PRESSURE: 75 MMHG

## 2024-08-27 PROCEDURE — G0156 HHCP-SVS OF AIDE,EA 15 MIN: HCPCS

## 2024-08-27 ASSESSMENT — ENCOUNTER SYMPTOMS: BOWEL INCONTINENCE: 1

## 2024-08-27 NOTE — HOSPICE
89 yo F with Principle Hospice Diagnosis: protein calorie malnutrition; Diagnoses RELATED to the terminal prognosis: dysphagia, dementia , sepsis, aspiration pneumonia, presence of an unstageable pressure ulcers, past CVA. Pt found at home in bed sleeping deeply while being cared for by her daughter. Pt slept through today's assessment: VS - 97.9, 146/75, 87, 22, 98% on RA. Pt is A&O x 0, S1S2 present, lungs sounds diminished in bases. Bilateral upper and lower pulses palpable but weak. Abd soft with normoactive BS. Motor function and sensory is intact. Pt has an unstagable sacral pressure ulcer which was cleaned and bandaged per orders. Pt is incontinent of urine and stool. Pt has bilarteral hip contractures along with bilateral heel pressure injuries.  Medication were reviewed and reconcilled and allergies reviewed via [place]. No medications needed this visit. Hospice 24/7 availability was emphasized and contact numbers were provided. Pt left with family sleeping comfortably and showing on sighns of distress or discomfort with daughter.

## 2024-08-29 ENCOUNTER — HOME CARE VISIT (OUTPATIENT)
Facility: HOME HEALTH | Age: 89
End: 2024-08-29
Payer: MEDICARE

## 2024-08-29 PROCEDURE — G0156 HHCP-SVS OF AIDE,EA 15 MIN: HCPCS
